# Patient Record
Sex: MALE | Race: WHITE | Employment: OTHER | URBAN - METROPOLITAN AREA
[De-identification: names, ages, dates, MRNs, and addresses within clinical notes are randomized per-mention and may not be internally consistent; named-entity substitution may affect disease eponyms.]

---

## 2017-02-15 ENCOUNTER — HOSPITAL ENCOUNTER (OUTPATIENT)
Dept: PHYSICAL THERAPY | Age: 77
Discharge: HOME | End: 2017-02-28
Admitting: FAMILY MEDICINE

## 2017-02-16 ENCOUNTER — HOSPITAL ENCOUNTER (OUTPATIENT)
Dept: PHYSICAL THERAPY | Age: 77
Discharge: HOME | End: 2017-02-16
Admitting: FAMILY MEDICINE

## 2017-02-21 ENCOUNTER — HOSPITAL ENCOUNTER (OUTPATIENT)
Dept: PHYSICAL THERAPY | Age: 77
Discharge: HOME | End: 2017-02-21
Admitting: FAMILY MEDICINE

## 2017-02-28 ENCOUNTER — HOSPITAL ENCOUNTER (OUTPATIENT)
Dept: PHYSICAL THERAPY | Age: 77
Discharge: HOME | End: 2017-02-28
Admitting: FAMILY MEDICINE

## 2017-03-02 ENCOUNTER — HOSPITAL ENCOUNTER (OUTPATIENT)
Dept: PHYSICAL THERAPY | Age: 77
Discharge: HOME | End: 2017-03-02
Admitting: FAMILY MEDICINE

## 2017-03-07 ENCOUNTER — HOSPITAL ENCOUNTER (OUTPATIENT)
Dept: PHYSICAL THERAPY | Age: 77
Discharge: HOME | End: 2017-03-07
Admitting: FAMILY MEDICINE

## 2017-03-09 ENCOUNTER — HOSPITAL ENCOUNTER (OUTPATIENT)
Dept: PHYSICAL THERAPY | Age: 77
Discharge: HOME | End: 2017-03-09
Admitting: FAMILY MEDICINE

## 2017-03-14 ENCOUNTER — HOSPITAL ENCOUNTER (OUTPATIENT)
Dept: PHYSICAL THERAPY | Age: 77
Discharge: HOME | End: 2017-03-14
Admitting: FAMILY MEDICINE

## 2017-03-16 ENCOUNTER — HOSPITAL ENCOUNTER (OUTPATIENT)
Dept: PHYSICAL THERAPY | Age: 77
Discharge: HOME | End: 2017-03-16
Admitting: FAMILY MEDICINE

## 2017-03-21 ENCOUNTER — HOSPITAL ENCOUNTER (OUTPATIENT)
Dept: PHYSICAL THERAPY | Age: 77
Discharge: HOME | End: 2017-03-21
Admitting: FAMILY MEDICINE

## 2017-03-23 ENCOUNTER — HOSPITAL ENCOUNTER (OUTPATIENT)
Dept: PHYSICAL THERAPY | Age: 77
Discharge: HOME | End: 2017-03-23
Admitting: FAMILY MEDICINE

## 2017-03-28 ENCOUNTER — HOSPITAL ENCOUNTER (OUTPATIENT)
Dept: PHYSICAL THERAPY | Age: 77
Discharge: HOME | End: 2017-03-28
Admitting: FAMILY MEDICINE

## 2017-03-30 ENCOUNTER — HOSPITAL ENCOUNTER (OUTPATIENT)
Dept: PHYSICAL THERAPY | Age: 77
Discharge: HOME | End: 2017-03-30
Admitting: FAMILY MEDICINE

## 2017-04-04 ENCOUNTER — HOSPITAL ENCOUNTER (OUTPATIENT)
Dept: PHYSICAL THERAPY | Age: 77
Discharge: HOME | End: 2017-04-04
Admitting: FAMILY MEDICINE

## 2017-04-11 ENCOUNTER — HOSPITAL ENCOUNTER (OUTPATIENT)
Dept: PHYSICAL THERAPY | Age: 77
Discharge: HOME | End: 2017-04-11
Admitting: FAMILY MEDICINE

## 2017-04-13 ENCOUNTER — HOSPITAL ENCOUNTER (OUTPATIENT)
Dept: PHYSICAL THERAPY | Age: 77
Discharge: HOME | End: 2017-04-13
Admitting: FAMILY MEDICINE

## 2021-08-24 ENCOUNTER — OFFICE VISIT (OUTPATIENT)
Dept: PULMONOLOGY | Age: 81
End: 2021-08-24
Payer: MEDICARE

## 2021-08-24 VITALS
RESPIRATION RATE: 24 BRPM | HEART RATE: 110 BPM | SYSTOLIC BLOOD PRESSURE: 117 MMHG | DIASTOLIC BLOOD PRESSURE: 64 MMHG | OXYGEN SATURATION: 96 % | WEIGHT: 254 LBS | BODY MASS INDEX: 36.36 KG/M2 | TEMPERATURE: 98 F | HEIGHT: 70 IN

## 2021-08-24 DIAGNOSIS — R06.02 SHORTNESS OF BREATH: ICD-10-CM

## 2021-08-24 DIAGNOSIS — G47.33 OBSTRUCTIVE SLEEP APNEA: ICD-10-CM

## 2021-08-24 DIAGNOSIS — J84.112 IPF (IDIOPATHIC PULMONARY FIBROSIS) (HCC): Primary | ICD-10-CM

## 2021-08-24 DIAGNOSIS — J98.6 PARALYSIS OF DIAPHRAGM: ICD-10-CM

## 2021-08-24 DIAGNOSIS — R09.02 HYPOXEMIA: ICD-10-CM

## 2021-08-24 DIAGNOSIS — I26.99 OTHER PULMONARY EMBOLISM WITHOUT ACUTE COR PULMONALE, UNSPECIFIED CHRONICITY (HCC): ICD-10-CM

## 2021-08-24 PROCEDURE — 99214 OFFICE O/P EST MOD 30 MIN: CPT | Performed by: INTERNAL MEDICINE

## 2021-08-24 ASSESSMENT — SLEEP AND FATIGUE QUESTIONNAIRES
HOW LIKELY ARE YOU TO NOD OFF OR FALL ASLEEP WHILE SITTING QUIETLY AFTER LUNCH WITHOUT ALCOHOL: 0
HOW LIKELY ARE YOU TO NOD OFF OR FALL ASLEEP IN A CAR, WHILE STOPPED FOR A FEW MINUTES IN TRAFFIC: 0
ESS TOTAL SCORE: 0
NECK CIRCUMFERENCE (INCHES): 15.75
HOW LIKELY ARE YOU TO NOD OFF OR FALL ASLEEP WHILE LYING DOWN TO REST IN THE AFTERNOON WHEN CIRCUMSTANCES PERMIT: 0
HOW LIKELY ARE YOU TO NOD OFF OR FALL ASLEEP WHILE WATCHING TV: 0
HOW LIKELY ARE YOU TO NOD OFF OR FALL ASLEEP WHILE SITTING AND READING: 0
HOW LIKELY ARE YOU TO NOD OFF OR FALL ASLEEP WHILE SITTING AND TALKING TO SOMEONE: 0
HOW LIKELY ARE YOU TO NOD OFF OR FALL ASLEEP WHEN YOU ARE A PASSENGER IN A CAR FOR AN HOUR WITHOUT A BREAK: 0
HOW LIKELY ARE YOU TO NOD OFF OR FALL ASLEEP WHILE SITTING INACTIVE IN A PUBLIC PLACE: 0

## 2021-08-24 ASSESSMENT — ENCOUNTER SYMPTOMS
ALLERGIC/IMMUNOLOGIC NEGATIVE: 1
GASTROINTESTINAL NEGATIVE: 1
EYES NEGATIVE: 1
RESPIRATORY NEGATIVE: 1

## 2021-08-24 NOTE — PATIENT INSTRUCTIONS
ASSESSMENT/PLAN:  1. IPF (idiopathic pulmonary fibrosis) ([facility]  2. Obstructive sleep apnea  3. Hypoxemia  4. Shortness of breath  5. Paralysis of diaphragm  6. Other pulmonary embolism without acute cor pulmonale, unspecified chronicity (HCC)      Seen by ent but nothing wrong  PFt did not show flow volume loop obstructions  Has had problems with sob for sometime    Has been worked by by ENT, [address] which showed no problems    Has tried inhalers and still not helpiing    Has + sniff test for paralyzed Left diaphragm    Tried pulm rehab and didn't help   Spirometry shows moderate restrictive defect   Stridor is resolved  Leon Kendrick to [facility]- for evaluation of paralyzed left diaphragm and had surgery 8/29/2016  For pulling down the diaphragm    Will get cxr to see about this. Will need to continue and add more exercise          As tried, [facility], advair, symbicort, sprivia, breo, anoro and nothing has helped with the breathing      Pulmonary fibrosis  Continue with:  Esbriet 801mg three times a day (still taking 3 tabs TID)  Will need to check liver function  Last done 9/2020  Oxygen at 2 lpm    GERD  esomeprazole    Will get this checked today, LFT    PE  On eliquis 2.5 mg twice a day    Continue with exercise despite the occasional weakness     Need to do upper body exercise    bipap is 14/10  ti max of 1.6 and min 0.4  Trigger and cycle are med    Full facemask    Humidity at 4  Using 100 % of time  Using 11 h/night    No leak at 1.1 lpm  No sleep apnea, ahi is 0.1   tv is 575  mv is 11.3 lpm    Doing well with bipap  Feeling the benefit of bipap    dme is [employer]  I have access via [email or web address]         Will need walking/rolling walker (rolator), I will order for patient- needs at least 19 inch wide seat  pt's hip to hip measurement is 19\".      Would recommend walking aid , id greg, but would defer to Dr. Fariha Julian     Oxygen to continue with 2 lpm, needed 24 hours a day  [facility]    Weight is coming down , was 299 and then at 282 and then at 277 and then at 290 and then at 287 and now at [phone]    RTC in 4 months    Remember to bring a list of pulmonary medications and any CPAP or BiPAP machines to your next appointment with the office. Please keep all of your future appointments scheduled by [address], [facility] office. Out of respect for other patients and providers, you may be asked to reschedule your appointment if you arrive later than your scheduled appointment time. Appointments cancelled less than 24hrs in advance will be considered a no show. Patients with three missed appointments within 1 year or four missed appointments within 2 years can be dismissed from the practice. Please be aware that our physicians are required to work in the Intensive Care Unit at [facility].  Your appointment may need to be rescheduled if they are designated to work during your appointment time. You may receive a survey regarding the care you received during your visit. Your input is valuable to us. We encourage you to complete and return your survey. We hope you will choose us in the future for your healthcare needs. Pt instructed of all future appointment dates & times, including radiology, labs, procedures & referrals. If procedures were scheduled preparation instructions provided. Instructions on future appointments with [facility] were given.

## 2021-08-24 NOTE — PROGRESS NOTES
MA Communication:   The following orders are received by verbal communication from Bakari Madrigal MD    Orders include:  Orders given to pt for CXR and Labs       4 mo fu scheduled 12/14/21

## 2021-08-24 NOTE — LETTER
8/24/21        Elvin Hayes      I have seen this patient in the office today and wanted to communicate my findings and recommendations. Patient Instructions      ASSESSMENT/PLAN:  1. IPF (idiopathic pulmonary fibrosis) ([address]  2. Obstructive sleep apnea  3. Hypoxemia  4. Shortness of breath  5. Paralysis of diaphragm  6. Other pulmonary embolism without acute cor pulmonale, unspecified chronicity (HCC)      Seen by ent but nothing wrong  PFt did not show flow volume loop obstructions  Has had problems with sob for sometime    Has been worked by by ENT, [address] which showed no problems    Has tried inhalers and still not helpiing    Has + sniff test for paralyzed Left diaphragm    Tried pulm rehab and didn't help   Spirometry shows moderate restrictive defect   Stridor is resolved  Azeem Gamez to [facility]- for evaluation of paralyzed left diaphragm and had surgery 8/29/2016  For pulling down the diaphragm    Will get cxr to see about this.     Will need to continue and add more exercise          As tried, [facility], advair, symbicort, sprivia, breo, anoro and nothing has helped with the breathing      Pulmonary fibrosis  Continue with:  Esbriet 801mg three times a day (still taking 3 tabs TID)  Will need to check liver function  Last done 9/2020  Oxygen at 2 lpm    GERD  esomeprazole    Will get this checked today, LFT    PE  On eliquis 2.5 mg twice a day    Continue with exercise despite the occasional weakness     Need to do upper body exercise    bipap is 14/10  ti max of 1.6 and min 0.4  Trigger and cycle are med    Full facemask    Humidity at 4  Using 100 % of time  Using 11 h/night    No leak at 1.1 lpm  No sleep apnea, ahi is 0.1   tv is 575  mv is 11.3 lpm    Doing well with bipap  Feeling the benefit of bipap    dme is [employer]  I have access via [employer]         Will need walking/rolling walker (rolator), I will order for patient- needs at least 19 inch wide seat  pt's hip to hip measurement is 19\".      Would recommend walking aid , id scooter, but would defer to Dr. Cherylynn Moritz     Oxygen to continue with 2 lpm, needed 24 hours a day  Will [facility] for POC    Weight is coming down , was 299 and then at 282 and then at 277 and then at 290 and then at 287 and now at [phone]    RTC in 4 months                       Thank you for allowing me to assist in the care of the Roxane Schwab MD

## 2021-08-24 NOTE — PROGRESS NOTES
Kang Browne (:  1940) is a [de-identified] y.o. male,Established patient, here for evaluation of the following chief complaint(s):  No chief complaint on file. ASSESSMENT/PLAN:  1. IPF (idiopathic pulmonary fibrosis) ([address]  2. Obstructive sleep apnea  3. Hypoxemia  4. Shortness of breath  5. Paralysis of diaphragm  6. Other pulmonary embolism without acute cor pulmonale, unspecified chronicity (HCC)      Seen by ent but nothing wrong  PFt did not show flow volume loop obstructions  Has had problems with sob for sometime    Has been worked by by ENT, [address] which showed no problems    Has tried inhalers and still not helpiing    Has + sniff test for paralyzed Left diaphragm    Tried pulm rehab and didn't help   Spirometry shows moderate restrictive defect   Stridor is resolved  Leon Marks to [facility]- for evaluation of paralyzed left diaphragm and had surgery 2016  For pulling down the diaphragm    Will get cxr to see about this.     Will need to continue and add more exercise          As tried, [facility], advair, symbicort, sprivia, breo, anoro and nothing has helped with the breathing      Pulmonary fibrosis  Continue with:  Esbriet 801mg three times a day (still taking 3 tabs TID)  Will need to check liver function  Last done 2020  Oxygen at 2 lpm    GERD  esomeprazole    Will get this checked today, LFT    PE  On eliquis 2.5 mg twice a day    Continue with exercise despite the occasional weakness     Need to do upper body exercise    bipap is 14/10  ti max of 1.6 and min 0.4  Trigger and cycle are med    Full facemask    Humidity at 4  Using 100 % of time  Using 11 h/night    No leak at 1.1 lpm  No sleep apnea, ahi is 0.1   tv is 575  mv is 11.3 lpm    Doing well with bipap  Feeling the benefit of bipap    dme is [employer]  I have access via [employer]         Will need walking/rolling walker (rolator), I will order for patient- needs at least 19 inch wide seat  pt's hip to hip measurement is 19\". Would recommend walking aid , id scooter, but would defer to Dr. Zaira Alvares     Oxygen to continue with 2 lpm, needed 24 hours a day  Will [facility] for POC    Weight is coming down , was 299 and then at 282 and then at 277 and then at 290 and then at 287 and now at [address]    RTC in 4 months          No follow-ups on file. [address] [facility]  [address]  [address] [place] [address]  Dept: [phone]  Loc: [phone]     Diagnosis:  [x] VESTA (ICD-10: G47.33)  o CSA (ICD-10: G47.31)  [] Complex Sleep Apnea (ICD-10: G47.37)  []  (ICD-10: G47.37)  [] Hypoxemia (ICD-10: R09.02)  [] COPD (J44.90)  [] Chronic Respiratory Failure with hypoxemia (J96.11)  [] Chronicr Respiratory Failure with hypercapnia (J96.12)  [] Restrictive Lung Disease (J98.4)      [] New Rx (Device Preference: _________________________)     [] Change Order       [] Replace ___________    [] Discontinue Order -  [] CPAP    [] BPAP    [] PAP    [] Oxygen   /   AMA?  [] Yes   [] No              Therapy    AHI: ________ KAYDEN: ________  LOW SpO2: ________%      DME: [facility]    DEVICE / SETTINGS RAMP / COMFORT INTERFACE   []  CPAP () Pressure    Ramp: _________ min's  [] Ramp to patient preference General PAP Supply Kit  Medicaid does not cover heated tubing  ([employer])  PAP Tubing:  [x] Heated ()- 1/3 mos                         [x] Regular ()  1/3 mos  [x] Disposable Water Chamber () - 1/6 mos  [x] Disposable Filter () - 2/mo  [x] Non-Disposable Filter () - 1/6 mos   []  BiLevel PAP ()           IPAP        []  BiLevel PAP with   ()       Backup Rate ()      EPAP Rate  [] Adjust FLEX to patient comfort       SUPPLEMENTAL OXYGEN  [] OXYGEN:      Liter/min: _________  [] Continuous        [] Nocturnal  [] Bleed into PAP Device      []  Xcovery Max Press   [facility]    [] Mask interface () - 1/3 mos  [] Nasal Cushion ()  2/mo  [] Nasal Pillows ()  -2/mo  [] Headgear ()  1/6 mos   []  AutoBiLevel () Pressure  ()      Support           []  ResMed® IVAPS EPAP  [] Overnight Oximetry on Room Air  [] Overnight Oximetry on PAP Therapy    Target Pt Rate  Min PS      Target Va  Patient Ht  Ti Max                Ti Min        Rise time  Max PS  Trigger  Cycle  Epap  Epap max  Epap min  The patient has a history of:  [] Excessive Daytime Sleepiness  [] Insomnia  [] Impaired Cognition  [] Ischemic Heart Disease  [] Hypertension  [] Mood Disorders          [] History of Stroke  Additional Orders:______________________________________________________________________________________________________________________________________________________________________________     Full Face Mask Kit    [x] Full face mask ()  1/3 mos  [x] Full Face Cushion ()  1/mo  [x] Headgear ()  1/6 mos                                           [] Respironics® ASV Advanced ()     EPAP Min  PS Min      EPAP Max  PS Max   Additional Supplies    [] Chin Strap ()  [] Heated Humidifier Kit ()  Mask Specifications:   [] Patient Preference      -or-            Brand:______________ Size:_______________   Type: __________________________________   [] Mask Refit:___________________________                                           Max Press  Ramp Time      Rate  Bi-flex: []1  []2  []3     [] Respironics® AVAPS: ()     IPAP Min  IPAP Max  Pressure Max  Epap max  Epap min  Rise time                      Avaps rate  EPAP   Additional Services    [] Annual PRN service and check of equipment  [] Routine service and check of equipment  [] Download and report compliance data   Tidal volume      Tigger                                     Rate                                         Inspiratory time The following equipment is Medically Necessary for the above stated patient. It is reasonable and necessary in reference to acceptable standards of medical practice for this condition, and is not prescribed as a convenience. Frequency of Use:    Daily                 Length of Need: 13 Months              o The patient requires BiLevel PAP and the following apply: []  The patient requires a Respiratory Assist Device (RAD) and the following apply:   o CPAP was tried and failed to meet therapeutic goals. [] CPAP was tried, but failed to meet therapeutic goals   o The prescribed mask interface has been properly fit, is the most comfortable to the patient and will be used with the BPAP device. [] The prescribed mask interface has been properly fit, is the most comfortable to the patient and will be used with the RAD.   o Current CPAP setting prevents patient from tolerating the therapy and lower CPAP settings fail to adequately control the symptoms of VESTA, improve sleep quality, or reduce AHI to acceptable levels. [] Current CPAP setting prevent patient from tolerating the therapy and lower PAP settings fail to  adequately control VESTA symptoms, improve sleep quality, or reduce AHI to acceptable levels.          [] There is significant improvement of the respiratory events on the RAD                                                                                                                                                                                                                                  Lorenda Olszewski, MD NPI- [id number]     KOTKA- [id number]                    08/24/21       ____________________________                        _______________________           Physician Signature                                                         Date                                                     Subjective   SUBJECTIVE/OBJECTIVE:  Pulmonary Problem, Sleep Problem, and Follow-up    Still with issues of walking and even with urination     No lightheaded     Chest pains, center and left arm pain  Dull pain  Stayed for about 1/2-1 hours  Then went to sleep         The klonopin not working       Seems more stable    Last week was having issues with breathing  Just sob    jsut comes and goes and there is no correlation to any weight  jsut felt weak    Right now taking therapy for the legs    Going rehab and trying to build up the legs  Will consider doing more    No bloating  No bupring  No chest pain  No headache    Pressure feels  Deep enough breath    Some dry mouth    The tank of water dries out too fast and the machine is too hot    Now having issues with walkign and standing    Breathing baseline       More falls and seeing Neurology  Unknown reason  No cva   Having more falls  Unable to find the reason  Will be getting scooter    Having some issues with sob with walking  And           Review of Systems   Constitutional: Negative. HENT: Negative. Eyes: Negative. Respiratory: Negative. Cardiovascular: Negative. Gastrointestinal: Negative. Endocrine: Negative. Genitourinary: Negative. Musculoskeletal: Negative. Skin: Negative. Allergic/Immunologic: Negative. Neurological: Negative. Hematological: Negative. Psychiatric/Behavioral: Negative. Objective   Physical Exam  Vitals and nursing note reviewed. Constitutional:       General: He is not in acute distress. Appearance: Normal appearance. He is not ill-appearing. HENT:      Head: Normocephalic and atraumatic. Right Ear: External ear normal.      Left Ear: External ear normal.      Nose: Nose normal.      Mouth/Throat:      Mouth: Mucous membranes are moist.      Pharynx: Oropharynx is clear. Comments: Mallampati 3  Eyes:      General: No scleral icterus. Extraocular Movements: Extraocular movements intact.       Conjunctiva/sclera: Conjunctivae normal.

## 2021-09-20 LAB
ALBUMIN SERPL-MCNC: 3.7 GM/DL (ref 3.2–4.6)
ALP BLD-CCNC: 130 U/L (ref 40–129)
ALT SERPL-CCNC: 11 U/L
AST SERPL-CCNC: 14 U/L
BILIRUB SERPL-MCNC: 0.3 MG/DL (ref 0.1–1.4)
BILIRUBIN DIRECT: <0.2 MG/DL (ref 0–0.3)
TOTAL PROTEIN: 6.5 GM/DL (ref 6.4–8.3)

## 2021-12-14 ENCOUNTER — OFFICE VISIT (OUTPATIENT)
Dept: PULMONOLOGY | Age: 81
End: 2021-12-14
Payer: MEDICARE

## 2021-12-14 VITALS
BODY MASS INDEX: 36.94 KG/M2 | HEIGHT: 70 IN | TEMPERATURE: 97.5 F | OXYGEN SATURATION: 95 % | HEART RATE: 105 BPM | DIASTOLIC BLOOD PRESSURE: 62 MMHG | WEIGHT: 258 LBS | RESPIRATION RATE: 16 BRPM | SYSTOLIC BLOOD PRESSURE: 145 MMHG

## 2021-12-14 DIAGNOSIS — I26.99 OTHER PULMONARY EMBOLISM WITHOUT ACUTE COR PULMONALE, UNSPECIFIED CHRONICITY (HCC): ICD-10-CM

## 2021-12-14 DIAGNOSIS — J84.112 IPF (IDIOPATHIC PULMONARY FIBROSIS) (HCC): Primary | ICD-10-CM

## 2021-12-14 DIAGNOSIS — R09.02 HYPOXEMIA: ICD-10-CM

## 2021-12-14 DIAGNOSIS — R06.02 SHORTNESS OF BREATH: ICD-10-CM

## 2021-12-14 DIAGNOSIS — J98.6 PARALYSIS OF DIAPHRAGM: ICD-10-CM

## 2021-12-14 DIAGNOSIS — G47.33 OBSTRUCTIVE SLEEP APNEA: ICD-10-CM

## 2021-12-14 PROCEDURE — G8417 CALC BMI ABV UP PARAM F/U: HCPCS | Performed by: INTERNAL MEDICINE

## 2021-12-14 PROCEDURE — G8484 FLU IMMUNIZE NO ADMIN: HCPCS | Performed by: INTERNAL MEDICINE

## 2021-12-14 PROCEDURE — 99214 OFFICE O/P EST MOD 30 MIN: CPT | Performed by: INTERNAL MEDICINE

## 2021-12-14 PROCEDURE — 4040F PNEUMOC VAC/ADMIN/RCVD: CPT | Performed by: INTERNAL MEDICINE

## 2021-12-14 PROCEDURE — G8427 DOCREV CUR MEDS BY ELIG CLIN: HCPCS | Performed by: INTERNAL MEDICINE

## 2021-12-14 PROCEDURE — 1036F TOBACCO NON-USER: CPT | Performed by: INTERNAL MEDICINE

## 2021-12-14 PROCEDURE — 1123F ACP DISCUSS/DSCN MKR DOCD: CPT | Performed by: INTERNAL MEDICINE

## 2021-12-14 ASSESSMENT — ENCOUNTER SYMPTOMS
EYES NEGATIVE: 1
ALLERGIC/IMMUNOLOGIC NEGATIVE: 1
GASTROINTESTINAL NEGATIVE: 1
RESPIRATORY NEGATIVE: 1

## 2021-12-14 NOTE — PROGRESS NOTES
Ranjit Green (:  1940) is a [de-identified] y.o. male,Established patient, here for evaluation of the following chief complaint(s):  Follow-up (4 mon pulm/sleep) and Sleep Apnea         ASSESSMENT/PLAN:  1. IPF (idiopathic pulmonary fibrosis) ([address]  2. Obstructive sleep apnea  3. Hypoxemia  4. Shortness of breath  5. Paralysis of diaphragm  6. Other pulmonary embolism without acute cor pulmonale, unspecified chronicity (HCC)      Seen by ent but nothing wrong  PFt did not show flow volume loop obstructions  Has had problems with sob for sometime    Has been worked by by ENT, [address] which showed no problems    Has tried inhalers and still not helpiing    Has + sniff test for paralyzed Left diaphragm    Tried pulm rehab and didn't help   Spirometry shows moderate restrictive defect   Stridor is resolved  Eliot Castellanos to [facility]- for evaluation of paralyzed left diaphragm and had surgery 2016  For pulling down the diaphragm    cxr done 21    LUNGS/PLEURA:  Chronic diffuse interstitial pulmonary fibrosis.  No acute interval change.            IMPRESSION:   1.  Chronic diffuse interstitial pulmonary fibrosis. 2.  No acute interval change.    SIGNED BY: Edelmira Rojo MD on 2021  4:39 PM         Will need to continue and add more exercise          As tried, [facility], advair, symbicort, sprivia, breo, anoro and nothing has helped with the breathing      Pulmonary fibrosis  Continue with:  Esbriet 801mg three times a day (still taking 3 tabs TID)  Will need to check liver function  Last done 21- normal  Oxygen at 2 lpm    GERD  esomeprazole        PE  On eliquis 2.5 mg twice a day    Continue with exercise despite the occasional weakness     Need to do upper body exercise    bipap is 14/10  ti max of 1.6 and min 0.4  Trigger and cycle are med    Full facemask    Humidity at 4  Using 100 % of time  Using 11 h/night    No leak at 0 lpm  No sleep apnea, ahi is 0.2  Samanta Corbin is 590  mv is 11.5 lpm    Doing well with bipap  Feeling the benefit of bipap    dme is [facility]  I have access via [employer]         Will need walking/rolling walker (rolator), I will order for patient- needs at least 19 inch wide seat  pt's hip to hip measurement is 19\". Oxygen to continue with 2 lpm, needed 24 hours a day  Will ask [facility] for POC    Weight is coming down , was 299 and then at 282 and then at 277 and then at 290 and then at 287 and then at 254 and now 258    COVID vaccine uptodate    RTC in 6 months          No follow-ups on file. Subjective   SUBJECTIVE/OBJECTIVE:  Pulmonary Problem, Sleep Problem, and Follow-up    Still with issues of walking and even with urination     No lightheaded     Chest pains, center and left arm pain  Dull pain  Stayed for about 1/2-1 hours  Then went to sleep         The klonopin not working       Seems more stable    Last week was having issues with breathing  Just sob    jsut comes and goes and there is no correlation to any weight  jsut felt weak    Right now taking therapy for the legs    Going rehab and trying to build up the legs  Will consider doing more    No bloating  No bupring  No chest pain  No headache    Pressure feels  Deep enough breath    Some dry mouth    The tank of water dries out too fast and the machine is too hot    Now having issues with walkign and standing    Breathing baseline       More falls and seeing Neurology  Unknown reason  No cva   Having more falls  Unable to find the reason  Will be getting scooter    haivng issues with neuropathy and     Still falling at times      Having some issues with sob with walking  And           Review of Systems   Constitutional: Negative. HENT: Negative. Eyes: Negative. Respiratory: Negative. Cardiovascular: Negative. Gastrointestinal: Negative. Endocrine: Negative. Genitourinary: Negative. Musculoskeletal: Negative. Skin: Negative. Allergic/Immunologic: Negative. Neurological: Negative. Hematological: Negative. Psychiatric/Behavioral: Negative. Objective   Physical Exam  Vitals and nursing note reviewed. Constitutional:       General: He is not in acute distress. Appearance: Normal appearance. He is not ill-appearing. HENT:      Head: Normocephalic and atraumatic. Right Ear: External ear normal.      Left Ear: External ear normal.      Nose: Nose normal.      Mouth/Throat:      Mouth: Mucous membranes are moist.      Pharynx: Oropharynx is clear. Comments: Mallampati 3  Eyes:      General: No scleral icterus. Extraocular Movements: Extraocular movements intact. Conjunctiva/sclera: Conjunctivae normal.      Pupils: Pupils are equal, round, and reactive to light. Cardiovascular:      Rate and Rhythm: Normal rate and regular rhythm. Pulses: Normal pulses. Heart sounds: Normal heart sounds. No murmur heard. No friction rub. Pulmonary:      Effort: No respiratory distress. Breath sounds: No stridor. No wheezing, rhonchi or rales. Comments: Dec bs at left base  Chest:      Chest wall: No tenderness. Abdominal:      General: Abdomen is flat. Bowel sounds are normal. There is no distension. Tenderness: There is no abdominal tenderness. There is no guarding. Musculoskeletal:         General: No swelling or tenderness. Normal range of motion. Cervical back: Normal range of motion and neck supple. No rigidity. Skin:     General: Skin is warm and dry. Coloration: Skin is not jaundiced. Neurological:      General: No focal deficit present. Mental Status: He is alert and oriented to person, place, and time. Mental status is at baseline. Cranial Nerves: No cranial nerve deficit. Sensory: No sensory deficit. Motor: No weakness. Gait: Gait normal.   Psychiatric:         Mood and Affect: Mood normal.         Thought Content:  Thought content normal.         Judgment: Judgment

## 2021-12-14 NOTE — LETTER
12/14/21        Deena Verdugo      I have seen this patient in the office today and wanted to communicate my findings and recommendations. Patient Instructions        ASSESSMENT/PLAN:  1. IPF (idiopathic pulmonary fibrosis) ([address]  2. Obstructive sleep apnea  3. Hypoxemia  4. Shortness of breath  5. Paralysis of diaphragm  6. Other pulmonary embolism without acute cor pulmonale, unspecified chronicity (HCC)      Seen by ent but nothing wrong  PFt did not show flow volume loop obstructions  Has had problems with sob for sometime    Has been worked by by ENT, [address] which showed no problems    Has tried inhalers and still not helpiing    Has + sniff test for paralyzed Left diaphragm    Tried pulm rehab and didn't help   Spirometry shows moderate restrictive defect   Stridor is resolved  Sania Lemme to [facility]- for evaluation of paralyzed left diaphragm and had surgery 8/29/2016  For pulling down the diaphragm    cxr done 9/20/21    LUNGS/PLEURA:  Chronic diffuse interstitial pulmonary fibrosis.  No acute interval change.            IMPRESSION:   1.  Chronic diffuse interstitial pulmonary fibrosis. 2.  No acute interval change.    SIGNED BY: Deisy Fleming MD on 9/20/2021  4:39 PM         Will need to continue and add more exercise          As tried, Chyrel Fees, advair, symbicort, sprivia, breo, anoro and nothing has helped with the breathing      Pulmonary fibrosis  Continue with:  Esbriet 801mg three times a day (still taking 3 tabs TID)  Will need to check liver function  Last done 9/28/21- normal  Oxygen at 2 lpm    GERD  esomeprazole        PE  On eliquis 2.5 mg twice a day    Continue with exercise despite the occasional weakness     Need to do upper body exercise    bipap is 14/10  ti max of 1.6 and min 0.4  Trigger and cycle are med    Full facemask    Humidity at 4  Using 100 % of time  Using 11 h/night    No leak at 0 lpm  No sleep apnea, ahi is 0.2   tv is 590  mv is 11.5 lpm    Doing well with bipap  Feeling the benefit of bipap    dme is [facility]  I have access via airview         Will need walking/rolling walker (rolator), I will order for patient- needs at least 19 inch wide seat  pt's hip to hip measurement is 19\".      Oxygen to continue with 2 lpm, needed 24 hours a day  Will ask [facility] for POC    Weight is coming down , was 299 and then at 282 and then at 277 and then at 290 and then at 287 and then at 254 and now 258    COVID vaccine uptodate    RTC in 6 months                       Thank you for allowing me to assist in the care of the Og Saravia MD

## 2021-12-14 NOTE — PATIENT INSTRUCTIONS
ASSESSMENT/PLAN:  1. IPF (idiopathic pulmonary fibrosis) ([facility]  2. Obstructive sleep apnea  3. Hypoxemia  4. Shortness of breath  5. Paralysis of diaphragm  6. Other pulmonary embolism without acute cor pulmonale, unspecified chronicity (HCC)      Seen by ent but nothing wrong  PFt did not show flow volume loop obstructions  Has had problems with sob for sometime    Has been worked by by ENT, [address] which showed no problems    Has tried inhalers and still not helpiing    Has + sniff test for paralyzed Left diaphragm    Tried pulm rehab and didn't help   Spirometry shows moderate restrictive defect   Stridor is resolved  Sujey Lori to [facility]- for evaluation of paralyzed left diaphragm and had surgery 8/29/2016  For pulling down the diaphragm    cxr done 9/20/21    LUNGS/PLEURA:  Chronic diffuse interstitial pulmonary fibrosis.  No acute interval change.            IMPRESSION:   1.  Chronic diffuse interstitial pulmonary fibrosis. 2.  No acute interval change.    SIGNED BY: Bharti Romero MD on 9/20/2021  4:39 PM         Will need to continue and add more exercise          As tried, [facility], advair, symbicort, sprivia, breo, anoro and nothing has helped with the breathing      Pulmonary fibrosis  Continue with:  Esbriet 801mg three times a day (still taking 3 tabs TID)  Will need to check liver function  Last done 9/28/21- normal  Oxygen at 2 lpm    GERD  esomeprazole        PE  On eliquis 2.5 mg twice a day    Continue with exercise despite the occasional weakness     Need to do upper body exercise    bipap is 14/10  ti max of 1.6 and min 0.4  Trigger and cycle are med    Full facemask    Humidity at 4  Using 100 % of time  Using 11 h/night    No leak at 0 lpm  No sleep apnea, ahi is 0.2   tv is 590  mv is 11.5 lpm    Doing well with bipap  Feeling the benefit of bipap    dme is [employer]  I have access via airview         Will need walking/rolling walker (rolator), I will order for patient- needs at least 19 inch wide seat  pt's hip to hip measurement is 19\". Oxygen to continue with 2 lpm, needed 24 hours a day  Will ask surinderlucero for POC    Weight is coming down , was 299 and then at 282 and then at 277 and then at 290 and then at 287 and then at 254 and now 258    COVID vaccine uptodate    RTC in 6 months      Remember to bring a list of pulmonary medications and any CPAP or BiPAP machines to your next appointment with the office. Please keep all of your future appointments scheduled by [address] office. Out of respect for other patients and providers, you may be asked to reschedule your appointment if you arrive later than your scheduled appointment time. Appointments cancelled less than 24hrs in advance will be considered a no show. Patients with three missed appointments within 1 year or four missed appointments within 2 years can be dismissed from the practice. Please be aware that our physicians are required to work in the Intensive Care Unit at [facility].  Your appointment may need to be rescheduled if they are designated to work during your appointment time. You may receive a survey regarding the care you received during your visit. Your input is valuable to us. We encourage you to complete and return your survey. We hope you will choose us in the future for your healthcare needs. Pt instructed of all future appointment dates & times, including radiology, labs, procedures & referrals. If procedures were scheduled preparation instructions provided. Instructions on future appointments with [facility] were given.

## 2021-12-14 NOTE — PROGRESS NOTES
MA Communication:   The following orders are received by verbal communication from Barron Pat MD    Orders include:  6 mo fu scheduled 6/14/22

## 2021-12-15 ENCOUNTER — TELEPHONE (OUTPATIENT)
Dept: PULMONOLOGY | Age: 81
End: 2021-12-15

## 2022-05-25 ENCOUNTER — OFFICE VISIT (OUTPATIENT)
Dept: PULMONOLOGY | Age: 82
End: 2022-05-25
Payer: MEDICARE

## 2022-05-25 VITALS
SYSTOLIC BLOOD PRESSURE: 123 MMHG | TEMPERATURE: 96.7 F | DIASTOLIC BLOOD PRESSURE: 77 MMHG | RESPIRATION RATE: 20 BRPM | BODY MASS INDEX: 35.79 KG/M2 | WEIGHT: 250 LBS | OXYGEN SATURATION: 98 % | HEIGHT: 70 IN | HEART RATE: 107 BPM

## 2022-05-25 DIAGNOSIS — J84.112 IPF (IDIOPATHIC PULMONARY FIBROSIS) (HCC): Primary | ICD-10-CM

## 2022-05-25 DIAGNOSIS — I26.99 OTHER PULMONARY EMBOLISM WITHOUT ACUTE COR PULMONALE, UNSPECIFIED CHRONICITY (HCC): ICD-10-CM

## 2022-05-25 DIAGNOSIS — J98.6 PARALYSIS OF DIAPHRAGM: ICD-10-CM

## 2022-05-25 DIAGNOSIS — R09.02 HYPOXEMIA: ICD-10-CM

## 2022-05-25 DIAGNOSIS — G47.33 OBSTRUCTIVE SLEEP APNEA: ICD-10-CM

## 2022-05-25 DIAGNOSIS — R06.02 SHORTNESS OF BREATH: ICD-10-CM

## 2022-05-25 PROCEDURE — 1036F TOBACCO NON-USER: CPT | Performed by: INTERNAL MEDICINE

## 2022-05-25 PROCEDURE — G8417 CALC BMI ABV UP PARAM F/U: HCPCS | Performed by: INTERNAL MEDICINE

## 2022-05-25 PROCEDURE — 1123F ACP DISCUSS/DSCN MKR DOCD: CPT | Performed by: INTERNAL MEDICINE

## 2022-05-25 PROCEDURE — 99214 OFFICE O/P EST MOD 30 MIN: CPT | Performed by: INTERNAL MEDICINE

## 2022-05-25 PROCEDURE — G8427 DOCREV CUR MEDS BY ELIG CLIN: HCPCS | Performed by: INTERNAL MEDICINE

## 2022-05-25 RX ORDER — PIRFENIDONE 801 MG/1
TABLET, COATED ORAL 3 TIMES DAILY
COMMUNITY
End: 2022-07-18

## 2022-05-25 ASSESSMENT — ENCOUNTER SYMPTOMS
EYES NEGATIVE: 1
ALLERGIC/IMMUNOLOGIC NEGATIVE: 1
RESPIRATORY NEGATIVE: 1
GASTROINTESTINAL NEGATIVE: 1

## 2022-05-25 NOTE — PROGRESS NOTES
MA Communication:   The following orders are received by verbal communication from Katelin Epstein MD    Orders include:  Order sent to [address]       6 mo fu scheduled 11/30/22

## 2022-05-25 NOTE — LETTER
[facility] Critical [facility]  [address] [address] [address]  Phone: [phone]  Fax: [phone]    Ana Elias MD        May 25, 2022     Patient: Sade Eng   YOB: 1940   Date of Visit: 5/25/2022 5/25/22        Sade Eng      I have seen this patient in the office today and wanted to communicate my findings and recommendations. Patient Instructions        ASSESSMENT/PLAN:  1. IPF (idiopathic pulmonary fibrosis) (HCC)  2. Paralysis of diaphragm  3. Obstructive sleep apnea  4. Hypoxemia  5. Shortness of breath  6. Other pulmonary embolism without acute cor pulmonale, unspecified chronicity (HCC)      Seen by ent but nothing wrong  PFt did not show flow volume loop obstructions  Has had problems with sob for sometime    Has been worked by by ENT, [address] which showed no problems    Has tried inhalers and still not helpiing    Has + sniff test for paralyzed Left diaphragm    Tried pulm rehab and didn't help   Spirometry shows moderate restrictive defect   Stridor is resolved  Sari Pollard to [facility]- for evaluation of paralyzed left diaphragm and had surgery 8/29/2016  For pulling down the diaphragm    cxr done 9/20/21    LUNGS/PLEURA:  Chronic diffuse interstitial pulmonary fibrosis.  No acute interval change.            IMPRESSION:   1.  Chronic diffuse interstitial pulmonary fibrosis. 2.  No acute interval change.    SIGNED BY: Erika Leger MD on 9/20/2021  4:39 PM         Will need to continue and add more exercise          As tried, Jigar Gin, advair, symbicort, sprivia, breo, anoro and nothing has helped with the breathing      Pulmonary fibrosis  Continue with:  Esbriet 801mg three times a day (still taking 3 tabs TID)  Will need to check liver function  Last done 9/28/21- normal  Oxygen at 2 lpm    GERD  esomeprazole        PE  On eliquis 2.5 mg twice a day    Continue with exercise despite the occasional weakness     Need to do upper body exercise      VESTA  bipap is 14/10  Set up date was 3/22/2018  ti max of 1.6 and min 0.4  Trigger and cycle are med    Full facemask    Humidity at 4  Using 100 % of time  Using 11 h/night    No leak at 0 lpm  No sleep apnea, ahi is 0.2   tv is 606  mv is 12.5 lpm    Doing well with bipap  Feeling the benefit of bipap    dme is [facility]  I have access via airview        Oxygen to continue with 2 lpm, needed 24 hours a day  Will ask [facility] for POC    Weight is coming down , was 299 and then at 282 and then at 277 and then at 290 and then at 287 and then at 254 and then 258 now at [address]    RTC in 6 months                       Thank you for allowing me to assist in the care of the MD Zechariah Ramos MD

## 2022-05-25 NOTE — PROGRESS NOTES
Ryan Akins (:  1940) is a [de-identified] y.o. male,Established patient, here for evaluation of the following chief complaint(s): Other (IPF) and Sleep Apnea         ASSESSMENT/PLAN:  1. IPF (idiopathic pulmonary fibrosis) (HCC)  2. Paralysis of diaphragm  3. Obstructive sleep apnea  4. Hypoxemia  5. Shortness of breath  6. Other pulmonary embolism without acute cor pulmonale, unspecified chronicity (HCC)      Seen by ent but nothing wrong  PFt did not show flow volume loop obstructions  Has had problems with sob for sometime    Has been worked by by ENT, [address] which showed no problems    Has tried inhalers and still not helpiing    Has + sniff test for paralyzed Left diaphragm    Tried pulm rehab and didn't help   Spirometry shows moderate restrictive defect   Stridor is resolved  Rivas Morales to [facility]- for evaluation of paralyzed left diaphragm and had surgery 2016  For pulling down the diaphragm    cxr done 21    LUNGS/PLEURA:  Chronic diffuse interstitial pulmonary fibrosis.  No acute interval change.            IMPRESSION:   1.  Chronic diffuse interstitial pulmonary fibrosis. 2.  No acute interval change.    SIGNED BY: Andrade Templeton MD on 2021  4:39 PM         Will need to continue and add more exercise          As tried, Carmela Qi, adv, symbicort, sprivia, breo, anoro and nothing has helped with the breathing      Pulmonary fibrosis  Continue with:  Esbriet 801mg three times a day (still taking 3 tabs TID)  Will need to check liver function  Last done 21- normal  Oxygen at 2 lpm    GERD  esomeprazole        PE  On eliquis 2.5 mg twice a day    Continue with exercise despite the occasional weakness     Need to do upper body exercise      VESTA  bipap is 14/10  Set up date was 3/22/2018  ti max of 1.6 and min 0.4  Trigger and cycle are med    Full facemask    Humidity at 4  Using 100 % of time  Using 11 h/night    No leak at 0 lpm  No sleep apnea, ahi is 0.2   tv is 606  mv is 12.5 lpm    Doing well with bipap  Feeling the benefit of bipap    dme is sru[facility]lucero  I have access via airview        Oxygen to continue with 2 lpm, needed 24 hours a day  Will ask [facility] for POC    Weight is coming down , was 299 and then at 282 and then at 277 and then at 290 and then at 287 and then at 254 and then 258 now at [address] [facility] in 6 months          No follow-ups on file. I have personally reviewed and summarized the old records and/or obtained further history from someone other than the patient. I have had a discussion with another health care provider    I have independently reviewed the images and reviewed with patient    I have reviewed the lab tests, radiology reports and medications    I have downloaded and interpreted the cpap/bipap/pap data. I have made adjustments as described    Reviewed present meds and side effects. Continue present meds. Stay compliant. Call if worsens. Reviewed proper inhaler usage             [address] [facility]  [address]  [address] [place] [address]  Dept: [phone]  Loc: [phone]     Diagnosis:  [x] VESTA (ICD-10: G47.33)  o CSA (ICD-10: G47.31)  [] Complex Sleep Apnea (ICD-10: G47.37)  []  (ICD-10: G47.37)  [] Hypoxemia (ICD-10: R09.02)  [x] COPD (J44.90)  [] Chronic Respiratory Failure with hypoxemia (J96.11)  [] Chronicr Respiratory Failure with hypercapnia (J96.12)  [] Restrictive Lung Disease (J98.4)      [] New Rx (Device Preference: _________________________)     [] Change Order       [] Replace ___________  [] Clinical assessments and may include IN-Check device, spirometry and ETCO2 PRN    [] Discontinue Order -  [] CPAP    [] BPAP    [] PAP    [] Oxygen   /   AMA?  [] Yes   [] No              Therapy    AHI: ________ KAYDEN: ________  LOW SpO2: ________%      DME:    DEVICE / SETTINGS RAMP / COMFORT INTERFACE   []  CPAP () Pressure    Ramp: _________ min's  [] Ramp to patient preference General PAP Supply Kit  Medicaid does not cover heated tubing  ([employer])  PAP Tubing:  [x] Heated ()- 1/3 mos                         [x] Regular () - 1/3 mos  [x] Disposable Water Chamber () - 1/6 mos  [x] Disposable Filter () - 2/mo  [x] Non-Disposable Filter () - 1/6 mos   []  BiLevel PAP ()           IPAP        []  BiLevel PAP with   ()       Backup Rate ()      EPAP Rate  [] Adjust FLEX to patient comfort       SUPPLEMENTAL OXYGEN  [] OXYGEN:      Liter/min: _________  [] Continuous        [] Nocturnal  [] Bleed into PAP Device      []  [address] Kit    [] Mask interface () - 1/3 mos  [] Nasal Cushion () - 2/mo  [] Nasal Pillows ()  -2/mo  [] Headgear () - 1/6 mos   []  AutoBiLevel () Pressure  ()      Support           []  ResMed® IVAPS EPAP  [] Overnight Oximetry on Room Air  [] Overnight Oximetry on PAP Therapy    Target Pt Rate  Min PS      Target Va  Patient Ht  Ti Max                Ti Min        Rise time  Max PS  Trigger  Cycle  Epap  Epap max  Epap min  The patient has a history of:  [] Excessive Daytime Sleepiness  [] Insomnia  [] Impaired Cognition  [] Ischemic Heart Disease  [] Hypertension  [] Mood Disorders          [] History of Stroke  Additional Orders:______________________________________________________________________________________________________________________________________________________________________________    [] Titrate to comfort Full Face Mask Kit    [x] Full face mask () - 1/3 mos  [x] Full Face Cushion () - 1/mo  [x] Headgear () - 1/6 mos                                           [] Respironics® ASV Advanced ()     EPAP Min  PS Min      EPAP Max  PS Max   Additional Supplies    [] Chin Strap ()  [] Heated Humidifier Kit ()  Mask Specifications:   [] Patient Preference      -or-            Brand:______________ Size:_______________   Type: __________________________________   [x] Mask Refit:_____pt losing weight and will need new full fac emask______________________                                           Max Press  Ramp Time      Rate  Bi-flex: []1  []2  []3     [] Respironics® AVAPS: ()     IPAP Min  IPAP Max  Pressure Max  Epap max  Epap min  Rise time                      Avaps rate  EPAP   Additional Services    [] Annual PRN service and check of equipment  [] Routine service and check of equipment  [] Download and report compliance data   Tidal volume      Tigger                                     Rate                                         Inspiratory time                                                         The following equipment is Medically Necessary for the above stated patient. It is reasonable and necessary in reference to acceptable standards of medical practice for this condition, and is not prescribed as a convenience. Frequency of Use:    Daily                 Length of Need: 13 Months              o The patient requires BiLevel PAP and the following apply: []  The patient requires a Respiratory Assist Device (RAD) and the following apply:   o CPAP was tried and failed to meet therapeutic goals. [] CPAP was tried, but failed to meet therapeutic goals   o The prescribed mask interface has been properly fit, is the most comfortable to the patient and will be used with the BPAP device. [] The prescribed mask interface has been properly fit, is the most comfortable to the patient and will be used with the RAD.   o Current CPAP setting prevents patient from tolerating the therapy and lower CPAP settings fail to adequately control the symptoms of VESTA, improve sleep quality, or reduce AHI to acceptable levels.  [] Current CPAP setting prevent patient from tolerating the therapy and lower PAP settings fail to  adequately control VESTA symptoms, improve sleep quality, or reduce AHI to acceptable levels. [] There is significant improvement of the respiratory events on the RAD                                                                                                                                                                                                                                  Trinh Jarrett MD               NPI- [id number]     [facility]- [phone]                    05/25/22       ____________________________                        _______________________           Physician Signature                                                         Date                                                     Subjective   SUBJECTIVE/OBJECTIVE:  Pulmonary Problem, Sleep Problem, and Follow-up    Still with issues of walking and even with urination     No lightheaded     Chest pains, center and left arm pain  Dull pain  Stayed for about 1/2-1 hours  Then went to sleep         The klonopin not working       Seems more stable    Last week was having issues with breathing  Just sob    jsut comes and goes and there is no correlation to any weight  jsut felt weak    Right now taking therapy for the legs    Going rehab and trying to build up the legs  Will consider doing more    No bloating  No bupring  No chest pain  No headache    Pressure feels  Deep enough breath    Some dry mouth  \  Now having issues with walkign and standing    Breathing baseline       More falls and seeing Neurology  Unknown reason  No cva   Having more falls  Unable to find the reason  Will be getting scooter    haivng issues with neuropathy and     Still falling at times      Now using rolator  Still sob  No wheezing   No hemoptyiss  No chest pain    Other        Review of Systems   Constitutional: Negative. HENT: Negative. Eyes: Negative. Respiratory: Negative. Cardiovascular: Negative.     Gastrointestinal: Negative. Endocrine: Negative. Genitourinary: Negative. Musculoskeletal: Negative. Skin: Negative. Allergic/Immunologic: Negative. Neurological: Negative. Hematological: Negative. Psychiatric/Behavioral: Negative. Vitals:    05/25/22 1301   BP: 123/77   Site: Left Lower Arm   Position: Sitting   Cuff Size: Medium Adult   Pulse: (!) 107   Resp: 20   Temp: (!) 96.7 °F (35.9 °C)   TempSrc: Temporal   SpO2: 98%   Weight: 250 lb (113.4 kg)   Height: 5' 10\" (1.778 m)       Objective   Physical Exam  Vitals and nursing note reviewed. Constitutional:       General: He is not in acute distress. Appearance: Normal appearance. He is not ill-appearing. HENT:      Head: Normocephalic and atraumatic. Right Ear: External ear normal.      Left Ear: External ear normal.      Nose: Nose normal.      Mouth/Throat:      Mouth: Mucous membranes are moist.      Pharynx: Oropharynx is clear. Comments: Mallampati 3  Eyes:      General: No scleral icterus. Extraocular Movements: Extraocular movements intact. Conjunctiva/sclera: Conjunctivae normal.      Pupils: Pupils are equal, round, and reactive to light. Cardiovascular:      Rate and Rhythm: Normal rate and regular rhythm. Pulses: Normal pulses. Heart sounds: Normal heart sounds. No murmur heard. No friction rub. Pulmonary:      Effort: No respiratory distress. Breath sounds: No stridor. No wheezing, rhonchi or rales. Comments: Dec bs at left base  Chest:      Chest wall: No tenderness. Abdominal:      General: Abdomen is flat. Bowel sounds are normal. There is no distension. Tenderness: There is no abdominal tenderness. There is no guarding. Musculoskeletal:         General: No swelling or tenderness. Normal range of motion. Cervical back: Normal range of motion and neck supple. No rigidity. Skin:     General: Skin is warm and dry. Coloration: Skin is not jaundiced.    Neurological:

## 2022-05-25 NOTE — PATIENT INSTRUCTIONS
ASSESSMENT/PLAN:  1. IPF (idiopathic pulmonary fibrosis) (HCC)  2. Paralysis of diaphragm  3. Obstructive sleep apnea  4. Hypoxemia  5. Shortness of breath  6. Other pulmonary embolism without acute cor pulmonale, unspecified chronicity (HCC)      Seen by ent but nothing wrong  PFt did not show flow volume loop obstructions  Has had problems with sob for sometime    Has been worked by by ENT, [address] which showed no problems    Has tried inhalers and still not helpiing    Has + sniff test for paralyzed Left diaphragm    Tried pulm rehab and didn't help   Spirometry shows moderate restrictive defect   Stridor is resolved  Sari Pollard to [facility] of paralyzed left diaphragm and had surgery 8/29/2016  For pulling down the diaphragm    cxr done 9/20/21    LUNGS/PLEURA:  Chronic diffuse interstitial pulmonary fibrosis.  No acute interval change.            IMPRESSION:   1.  Chronic diffuse interstitial pulmonary fibrosis. 2.  No acute interval change.    SIGNED BY: Erika Leger MD on 9/20/2021  4:39 PM         Will need to continue and add more exercise          As tried, [place], advair, symbicort, sprivia, breo, anoro and nothing has helped with the breathing      Pulmonary fibrosis  Continue with:  Esbriet 801mg three times a day (still taking 3 tabs TID)  Will need to check liver function  Last done 9/28/21- normal  Oxygen at 2 lpm    GERD  esomeprazole        PE  On eliquis 2.5 mg twice a day    Continue with exercise despite the occasional weakness     Need to do upper body exercise      VESTA  bipap is 14/10  Set up date was 3/22/2018  ti max of 1.6 and min 0.4  Trigger and cycle are med    Full facemask    Humidity at 4  Using 100 % of time  Using 11 h/night    No leak at 0 lpm  No sleep apnea, ahi is 0.2   tv is 606  mv is 12.5 lpm    Doing well with bipap  Feeling the benefit of bipap    dme is [employer]  I have access via airview        Oxygen to continue with 2 lpm, needed 24 hours a day  Will ask tierney for POC    Weight is coming down , was 299 and then at 282 and then at 277 and then at 290 and then at 287 and then at 254 and then 258 now at [address] [facility]    RTC in 6 months    Remember to bring a list of pulmonary medications and any CPAP or BiPAP machines to your next appointment with the office. Please keep all of your future appointments scheduled by [facility] office. Out of respect for other patients and providers, you may be asked to reschedule your appointment if you arrive later than your scheduled appointment time. Appointments cancelled less than 24hrs in advance will be considered a no show. Patients with three missed appointments within 1 year or four missed appointments within 2 years can be dismissed from the practice. Please be aware that our physicians are required to work in the Intensive Care Unit at [facility].  Your appointment may need to be rescheduled if they are designated to work during your appointment time. You may receive a survey regarding the care you received during your visit. Your input is valuable to us. We encourage you to complete and return your survey. We hope you will choose us in the future for your healthcare needs. Pt instructed of all future appointment dates & times, including radiology, labs, procedures & referrals. If procedures were scheduled preparation instructions provided. Instructions on future appointments with [facility] were given.

## 2022-07-18 RX ORDER — PIRFENIDONE 801 MG/1
TABLET, COATED ORAL
Qty: 90 TABLET | Refills: 11 | Status: SHIPPED | OUTPATIENT
Start: 2022-07-18

## 2022-11-30 ENCOUNTER — TELEPHONE (OUTPATIENT)
Dept: PULMONOLOGY | Age: 82
End: 2022-11-30

## 2022-11-30 ENCOUNTER — OFFICE VISIT (OUTPATIENT)
Dept: PULMONOLOGY | Age: 82
End: 2022-11-30
Payer: MEDICARE

## 2022-11-30 VITALS
HEART RATE: 107 BPM | WEIGHT: 252.2 LBS | DIASTOLIC BLOOD PRESSURE: 67 MMHG | SYSTOLIC BLOOD PRESSURE: 137 MMHG | OXYGEN SATURATION: 95 % | BODY MASS INDEX: 36.11 KG/M2 | HEIGHT: 70 IN | RESPIRATION RATE: 20 BRPM | TEMPERATURE: 97.8 F

## 2022-11-30 DIAGNOSIS — G47.33 OSA ON CPAP: ICD-10-CM

## 2022-11-30 DIAGNOSIS — J98.6 PARALYZED HEMIDIAPHRAGM: ICD-10-CM

## 2022-11-30 DIAGNOSIS — J84.10 PULMONARY FIBROSIS (HCC): Primary | Chronic | ICD-10-CM

## 2022-11-30 DIAGNOSIS — Z99.89 OSA ON CPAP: ICD-10-CM

## 2022-11-30 PROBLEM — I82.4Z2 DEEP VEIN THROMBOSIS (DVT) OF DISTAL VEIN OF LEFT LOWER EXTREMITY (HCC): Status: ACTIVE | Noted: 2017-09-26

## 2022-11-30 PROBLEM — I26.99 PULMONARY EMBOLISM AND INFARCTION (HCC): Status: ACTIVE | Noted: 2017-09-26

## 2022-11-30 PROCEDURE — 3074F SYST BP LT 130 MM HG: CPT | Performed by: NURSE PRACTITIONER

## 2022-11-30 PROCEDURE — G8427 DOCREV CUR MEDS BY ELIG CLIN: HCPCS | Performed by: NURSE PRACTITIONER

## 2022-11-30 PROCEDURE — 1123F ACP DISCUSS/DSCN MKR DOCD: CPT | Performed by: NURSE PRACTITIONER

## 2022-11-30 PROCEDURE — 3078F DIAST BP <80 MM HG: CPT | Performed by: NURSE PRACTITIONER

## 2022-11-30 PROCEDURE — G8417 CALC BMI ABV UP PARAM F/U: HCPCS | Performed by: NURSE PRACTITIONER

## 2022-11-30 PROCEDURE — 99214 OFFICE O/P EST MOD 30 MIN: CPT | Performed by: NURSE PRACTITIONER

## 2022-11-30 PROCEDURE — G8484 FLU IMMUNIZE NO ADMIN: HCPCS | Performed by: NURSE PRACTITIONER

## 2022-11-30 PROCEDURE — 1036F TOBACCO NON-USER: CPT | Performed by: NURSE PRACTITIONER

## 2022-11-30 RX ORDER — PIOGLITAZONEHYDROCHLORIDE 45 MG/1
TABLET ORAL
COMMUNITY
Start: 2022-09-30

## 2022-11-30 RX ORDER — APIXABAN 2.5 MG/1
TABLET, FILM COATED ORAL
COMMUNITY
Start: 2022-11-27

## 2022-11-30 RX ORDER — ATORVASTATIN CALCIUM 80 MG/1
TABLET, FILM COATED ORAL
COMMUNITY
Start: 2022-11-25

## 2022-11-30 RX ORDER — ZOLPIDEM TARTRATE 10 MG/1
TABLET ORAL
COMMUNITY
Start: 2022-09-17

## 2022-11-30 RX ORDER — OMEPRAZOLE 40 MG/1
CAPSULE, DELAYED RELEASE ORAL
COMMUNITY
Start: 2022-09-30

## 2022-11-30 RX ORDER — QUETIAPINE FUMARATE 25 MG/1
TABLET, FILM COATED ORAL
COMMUNITY
Start: 2022-09-28

## 2022-11-30 RX ORDER — NORTRIPTYLINE HYDROCHLORIDE 50 MG/1
CAPSULE ORAL
COMMUNITY
Start: 2022-10-26

## 2022-11-30 ASSESSMENT — SLEEP AND FATIGUE QUESTIONNAIRES
ESS TOTAL SCORE: 0
HOW LIKELY ARE YOU TO NOD OFF OR FALL ASLEEP IN A CAR, WHILE STOPPED FOR A FEW MINUTES IN TRAFFIC: 0
HOW LIKELY ARE YOU TO NOD OFF OR FALL ASLEEP WHILE LYING DOWN TO REST IN THE AFTERNOON WHEN CIRCUMSTANCES PERMIT: 0
HOW LIKELY ARE YOU TO NOD OFF OR FALL ASLEEP WHILE SITTING INACTIVE IN A PUBLIC PLACE: 0
HOW LIKELY ARE YOU TO NOD OFF OR FALL ASLEEP WHILE SITTING AND TALKING TO SOMEONE: 0
NECK CIRCUMFERENCE (INCHES): 19
HOW LIKELY ARE YOU TO NOD OFF OR FALL ASLEEP WHEN YOU ARE A PASSENGER IN A CAR FOR AN HOUR WITHOUT A BREAK: 0
HOW LIKELY ARE YOU TO NOD OFF OR FALL ASLEEP WHILE SITTING QUIETLY AFTER LUNCH WITHOUT ALCOHOL: 0
HOW LIKELY ARE YOU TO NOD OFF OR FALL ASLEEP WHILE SITTING AND READING: 0
HOW LIKELY ARE YOU TO NOD OFF OR FALL ASLEEP WHILE WATCHING TV: 0

## 2022-11-30 ASSESSMENT — ENCOUNTER SYMPTOMS
SORE THROAT: 0
ABDOMINAL PAIN: 0
CHEST TIGHTNESS: 0
RHINORRHEA: 0
COUGH: 0
SHORTNESS OF BREATH: 0
EYE PAIN: 0
WHEEZING: 0

## 2022-11-30 NOTE — TELEPHONE ENCOUNTER
[facility] called stating Wm. Was their to get his blood work done.   I faxed over a copy of the order to them @ [phone] clear

## 2022-11-30 NOTE — PROGRESS NOTES
MA Communication:   The following orders are received by verbal communication from Zachary Noe, [address]    Orders include:  6 month f/u Dr. Boris Lin  Blood work to be completed

## 2022-11-30 NOTE — PROGRESS NOTES
Chief Complaint   Patient presents with    Follow-up     6 month Pulm / Sleep     Juan Pablo Denny comes in today for follow-up of PAP therapy and his Pulmonary fibrosis, paralyzed diaphragm. He continues Esbriet and oxygen therapy. He has tried inhalers in the past however did not help. He had surgery on his diaphragm in 2016 at the [facility]. Today, states his breathing is at his baseline. He continues oxygen therapy along with Bipap at night. Patient had symptoms of EDS, snoring and poor sleep quality at time of diagnosis, resolved with pap therapy. Denies HA, ear popping or belching. No recent changes in health history. Reports sleepiness is better. Is not having extended sleeping. Is using equipment for 10 hours a night. Up at night to use the bathroom about: 1-2  Is not snoring with machine. Does not have dry mouth   Is not complaining of mask issues  Is tolerating the pressure. Sleep Medicine 11/30/2022 8/24/2021   Sitting and reading 0 0   Watching TV 0 0   Sitting, inactive in a public place (e.g. a theatre or a meeting) 0 0   As a passenger in a car for an hour without a break 0 0   Lying down to rest in the afternoon when circumstances permit 0 0   Sitting and talking to someone 0 0   Sitting quietly after a lunch without alcohol 0 0   In a car, while stopped for a few minutes in traffic 0 0   [place] Sleepiness Score 0 0   Neck circumference (Inches) 19 15.75     0 = no chance of dozing  1 = slight chance of dozing  2 = moderate chance of dozing  3 = high chance of dozing    Interpretation:   0-7: It is unlikely that you are abnormally sleepy. 8-9:     You have an average amount of daytime sleepiness. 10-15: You may be excessively sleepy depending on the situation. You may want to consider seeking medical attention. 16-24:   You are excessively sleepy and should consider seeking medical attention      PAP Compliance report reviewed dates: 10/28/22- 11/26/22:    PAP data -set on IPAP 14, EPAP 10  Days with usage 29/30 days   Days without device usage 1  Percent days with Device Usage 97 %   Percent of days with Usage greater than  4 hours 97 %   Average usage days used 10 hours 33 minutes     AHI 0.2      Exercise/diet: Weight today, 252 lbs, was 258 last December 2021. Not doing regular activity. Current Outpatient Medications   Medication Sig Dispense Refill    pioglitazone (ACTOS) 45 MG tablet       nortriptyline (PAMELOR) 50 MG capsule       zolpidem (AMBIEN) 10 MG tablet       QUEtiapine (SEROQUEL) 25 MG tablet       omeprazole (PRILOSEC) 40 MG delayed release capsule       atorvastatin (LIPITOR) 80 MG tablet TAKE 1 TABLET BY MOUTH EVERY DAY      ELIQUIS 2.5 MG TABS tablet       ESBRIET 801 MG TABS TAKE 1 TABLET THREE TIMES A DAY WITH FOOD 90 tablet 11    amLODIPine (NORVASC) 5 MG tablet TAKE 1 TABLET BY MOUTH EVERY DAY 90 tablet 0    isosorbide mononitrate (IMDUR) 30 MG CR tablet TAKE 1 TABLET BY MOUTH EVERY DAY 30 tablet 5    Multiple Vitamins-Minerals (THERAPEUTIC MULTIVITAMIN-MINERALS) tablet Take 1 tablet by mouth daily. furosemide (LASIX) 40 MG tablet Take 40 mg by mouth every morning. rosuvastatin (CRESTOR) 20 MG tablet Take 20 mg by mouth every evening. diclofenac (VOLTAREN) 75 MG EC tablet Take 75 mg by mouth 2 times daily. clonazepam (KLONOPIN) 1 MG disintegrating tablet Take 1 mg by mouth nightly as needed. SITagliptin (JANUVIA) 100 MG tablet Take 100 mg by mouth daily. ezetimibe (ZETIA) 10 MG tablet Take 10 mg by mouth every evening. aspirin 81 MG tablet Take 81 mg by mouth daily. No current facility-administered medications for this visit.         Allergies   Allergen Reactions    Azithromycin Itching        Past Medical History:   Diagnosis Date    AAA (abdominal aortic aneurysm)     Diabetes mellitus ([address]     Hyperlipidemia     Hypertension     Pulmonary embolus (HCC)     Pulmonary fibrosis [facility])         Past Surgical History:   Procedure Laterality Date    CATARACT REMOVAL WITH IMPLANT      bilateral eyes    CERVICAL DISCECTOMY      and fusion (anterior)    CYST REMOVAL      from the buttocks    FEMORAL BYPASS      bi-femoral bypass    HERNIA REPAIR      x 2    JOINT REPLACEMENT      bilateral knee replacement    NASAL SEPTUM SURGERY      SHOULDER SURGERY      left    TESTICLE REMOVAL      left    TOE SURGERY          Review of Systems   Constitutional:  Negative for chills, fatigue and fever. HENT:  Negative for congestion, postnasal drip, rhinorrhea and sore throat. Eyes:  Negative for pain and visual disturbance. Blurring in r eye that varies. Seeing eye doc. Respiratory:  Negative for cough, chest tightness, shortness of breath and wheezing. Cardiovascular:  Negative for chest pain, palpitations and leg swelling. Gastrointestinal:  Negative for abdominal pain. Genitourinary:  Negative for difficulty urinating. Musculoskeletal: Negative. Skin: Negative. Neurological:  Negative for dizziness, numbness and headaches. Psychiatric/Behavioral:  The patient is not nervous/anxious. /67 (Site: Left Lower Arm, Position: Sitting, Cuff Size: Medium Adult)   Pulse (!) 107   Temp 97.8 °F (36.6 °C) (Temporal)   Resp 20   Ht 5' 10\" (1.778 m)   Wt 252 lb 3.2 oz (114.4 kg)   SpO2 95% Comment: 2 ltrs  BMI 36.19 kg/m²     Additional Measurements    11/30/22 1310   Neck circumference (Inches): 19       Imaging/test:  CXR 9/2021 [facility]:      IMPRESSION:   1. Chronic diffuse interstitial pulmonary fibrosis. 2.  No acute interval change. PFT: 7/12/13 at [facility]:   Spirometry shows a moderate reduction in FVC with a mild reduction in FEV1. The FEV1:FVC ratio is within normal limits. There is no improvement after  bronchodilators. Lung volumes suggest associated restrictive defect and  diffusion capacity is within normal limits.       IMPRESSION:  Pulmonary function test without significant obstructive defect. There is an element of restriction suggested. In comparison with previous testing May 2009, the diffusion capacity is  improved. Total lung capacity has improved and spirometry is approximately  the same. Physical Exam  Vitals reviewed. Constitutional:       General: He is not in acute distress. Appearance: Normal appearance. He is obese. He is not ill-appearing, toxic-appearing or diaphoretic. HENT:      Head: Normocephalic and atraumatic. Nose: Nose normal. No congestion or rhinorrhea. Mouth/Throat:      Mouth: Mucous membranes are moist.      Pharynx: Oropharynx is clear. No oropharyngeal exudate or posterior oropharyngeal erythema. Eyes:      Pupils: Pupils are equal, round, and reactive to light. Cardiovascular:      Rate and Rhythm: Normal rate. Pulmonary:      Effort: Pulmonary effort is normal. No respiratory distress. Breath sounds: No stridor. Rales (faint, scattered) present. No wheezing or rhonchi. Chest:      Chest wall: No tenderness. Abdominal:      Palpations: Abdomen is soft. Tenderness: There is no abdominal tenderness. There is no guarding or rebound. Musculoskeletal:         General: Normal range of motion. Cervical back: Neck supple. Right lower leg: No edema. Left lower leg: No edema. Lymphadenopathy:      Cervical: No cervical adenopathy. Skin:     General: Skin is warm and dry. Capillary Refill: Capillary refill takes less than 2 seconds. Neurological:      Mental Status: He is alert and oriented to person, place, and time. Psychiatric:         Mood and Affect: Mood normal.         Behavior: Behavior normal.         Thought Content: Thought content normal.         Judgment: Judgment normal.        Assessment/Plan:     1. Pulmonary fibrosis (HCC)  2. Paralyzed hemidiaphragm  3. VESTA on CPAP       Cathy Dsouza has good benefit and adherence on PAP therapy. Compliance report information was analyzed to assess complexity and medical decision making in regards to further testing and management. Will prescribe home medical equipment company to check pressures, download usage and will replace mask, tubing, disposables and filters as needed. Instructed to wash or wipe face of excess oil before using CPAP to prevent the mask / nasal pillow from sliding and ensure proper fit. Empty the water daily and allow the chamber and tubings to air dry. Instructed how to properly clean the device to prevent bacteria and mold growth in the water chamber. Advised to avoid driving if too sleepy to function safely and given a discussion of the risks of untreated apneas such as accidents, cognitive impairment, mood impairment, worsening high blood pressure, various cardiac disease and stroke. Regarding obesity, recommend to try a formal program and/or increase physical activity by adding a 30 minute walk to daily routine. Weight loss was encouraged as a long term approach to treatment of VESTA. Explained the correlation between obesity and apnea and the causative role it can play. Follow up 6 months or sooner for any issues. Prior pulmonary OV note, PFT report and recent chest imaging report reviewed. I have personally reviewed and summarized the old records and/or obtained further history from someone other than the patient. Reviewed present meds and side effects. He is to continue oxygen therapy and Esbriet for his PF. Will get liver function test.     Discussed when to call with worsening symptoms such as increased shortness of breath, productive cough, wheezing or symptoms not responding to treatment plan.             CARLOTTA Beck - CNP

## 2022-11-30 NOTE — PATIENT INSTRUCTIONS
Sulaiman Jim has good benefit and adherence on PAP therapy. Compliance report information was analyzed to assess complexity and medical decision making in regards to further testing and management. Will prescribe home medical equipment company to check pressures, download usage and will replace mask, tubing, disposables and filters as needed. Instructed to wash or wipe face of excess oil before using CPAP to prevent the mask / nasal pillow from sliding and ensure proper fit. Empty the water daily and allow the chamber and tubings to air dry. Instructed how to properly clean the device to prevent bacteria and mold growth in the water chamber. Advised to avoid driving if too sleepy to function safely and given a discussion of the risks of untreated apneas such as accidents, cognitive impairment, mood impairment, worsening high blood pressure, various cardiac disease and stroke. Regarding obesity, recommend to try a formal program and/or increase physical activity by adding a 30 minute walk to daily routine. Weight loss was encouraged as a long term approach to treatment of VESTA. Explained the correlation between obesity and apnea and the causative role it can play. Call with worsening symptoms such as increased shortness of breath, productive cough, wheezing or symptoms not responding to treatment plan. Follow up 6 months or sooner for any issues. Get liver blood work completed     Remember to bring a list of pulmonary medications and any CPAP or BiPAP machines to your next appointment with the office. Please keep all of your future appointments scheduled by [address], [facility] office. Out of respect for other patients and providers, you may be asked to reschedule your appointment if you arrive later than your scheduled appointment time. Appointments cancelled less than 24hrs in advance will be considered a no show.  Patients with three missed appointments within 1 year or four missed appointments within 2 years can be dismissed from the practice. Please be aware that our physicians are required to work in the Intensive Care Unit at [facility].  Your appointment may need to be rescheduled if they are designated to work during your appointment time. You may receive a survey regarding the care you received during your visit. Your input is valuable to us. We encourage you to complete and return your survey. We hope you will choose us in the future for your healthcare needs. Pt instructed of all future appointment dates & times, including radiology, labs, procedures & referrals. If procedures were scheduled preparation instructions provided. Instructions on future appointments with [facility] were given.

## 2022-12-02 RX ORDER — PIRFENIDONE 801 MG/1
1 TABLET, FILM COATED ORAL
Qty: 90 TABLET | Refills: 11 | Status: SHIPPED | OUTPATIENT
Start: 2022-12-02

## 2023-01-09 ENCOUNTER — TELEPHONE (OUTPATIENT)
Dept: PULMONOLOGY | Age: 83
End: 2023-01-09

## 2023-01-09 NOTE — TELEPHONE ENCOUNTER
I called and s/w Mami from [address]. She said they had been in contact with patient. They do not deliver unless it is a minimum of 6 6 tanks. Anything less than that, pt will need to come in and  himself. Pt just needs to call them. SW wife and relayed message above. She states he needs 14 tanks, not 1. She will call them.

## 2023-01-09 NOTE — TELEPHONE ENCOUNTER
PORTABLE OXYGEN is still not arrived, patient has not heard anything about it. They were suppose to deliver tanks 1-6-2023 and staff have not heard anything from [address]. Patient has attempted to call the DME but no luck due to them saying that she needed to contact our office.

## 2023-01-16 ENCOUNTER — TELEPHONE (OUTPATIENT)
Dept: PULMONOLOGY | Age: 83
End: 2023-01-16

## 2023-01-16 NOTE — TELEPHONE ENCOUNTER
Nayeli glaser [facility] is calling about some medications for this patient. Please call her back.  Thanks

## 2023-01-16 NOTE — TELEPHONE ENCOUNTER
Veronika Moy said pt is on O2 24/7 and is still having issues with SOB. They want to know if Dr. Gil Dias would prescribe Woody Mare for patient. They are sending over a fax request for this.   I will forward it to Dr. Gil Dias when it arrives

## 2023-02-28 ENCOUNTER — HOSPITAL ENCOUNTER (EMERGENCY)
Age: 83
Discharge: HOME HEALTH | End: 2023-02-28
Attending: EMERGENCY MEDICINE
Payer: MEDICARE

## 2023-02-28 VITALS
DIASTOLIC BLOOD PRESSURE: 55 MMHG | OXYGEN SATURATION: 99 % | WEIGHT: 256 LBS | SYSTOLIC BLOOD PRESSURE: 110 MMHG | HEART RATE: 99 BPM | TEMPERATURE: 98 F | BODY MASS INDEX: 36.73 KG/M2 | RESPIRATION RATE: 16 BRPM

## 2023-02-28 DIAGNOSIS — H81.01 MENIERE DISEASE, RIGHT: ICD-10-CM

## 2023-02-28 DIAGNOSIS — H93.8X1 SENSATION OF FULLNESS IN RIGHT EAR: Primary | ICD-10-CM

## 2023-02-28 PROCEDURE — 6370000000 HC RX 637 (ALT 250 FOR IP): Performed by: EMERGENCY MEDICINE

## 2023-02-28 PROCEDURE — 6360000002 HC RX W HCPCS: Performed by: EMERGENCY MEDICINE

## 2023-02-28 PROCEDURE — 99284 EMERGENCY DEPT VISIT MOD MDM: CPT

## 2023-02-28 RX ORDER — MECLIZINE HCL 12.5 MG/1
12.5 TABLET ORAL 3 TIMES DAILY PRN
Qty: 15 TABLET | Refills: 0 | Status: SHIPPED | OUTPATIENT
Start: 2023-02-28 | End: 2023-03-10

## 2023-02-28 RX ORDER — MECLIZINE HCL 12.5 MG/1
25 TABLET ORAL ONCE
Status: COMPLETED | OUTPATIENT
Start: 2023-02-28 | End: 2023-02-28

## 2023-02-28 RX ORDER — DEXAMETHASONE 4 MG/1
10 TABLET ORAL ONCE
Status: COMPLETED | OUTPATIENT
Start: 2023-02-28 | End: 2023-02-28

## 2023-02-28 RX ADMIN — MECLIZINE 25 MG: 12.5 TABLET ORAL at 17:20

## 2023-02-28 RX ADMIN — DEXAMETHASONE 10 MG: 4 TABLET ORAL at 17:20

## 2023-02-28 ASSESSMENT — PAIN - FUNCTIONAL ASSESSMENT: PAIN_FUNCTIONAL_ASSESSMENT: 0-10

## 2023-02-28 NOTE — ED PROVIDER NOTES
[address] [facility]  ED  EMERGENCY DEPARTMENT ENCOUNTER        Patient Name: Florentin Tafoya  MRN: [id number]  [place] 1940  Date of evaluation: 2/28/2023  Provider: Jolene Quintanilla MD  PCP: Lauren Serrano MD  Note Started: 5:04 PM EST 2/28/23    CHIEF COMPLAINT       Ear Problem (Lost hearing in right ear yesterday, has become \" wobbly\" since yesterday)      HISTORY OF PRESENT ILLNESS: 1 or more Elements     History from : Patient    Limitations to history : None    Florentin Tafoya is a 81 y.o. male who presents for evaluation of decreased sided hearing. He states starting several day ago, he lost hearing in the right ear. He states that he has become \"wobbly\" since that time. No falls or head injury. No fevers. No URI symptoms. He denies any weakness in his extremities. Has not recently seen an ear nose and throat doctor. Nursing Notes were all reviewed and agreed with or any disagreements were addressed in the HPI. REVIEW OF SYSTEMS :      Review of Systems    Positives and Pertinent negatives as per HPI. SURGICAL HISTORY     Past Surgical History:   Procedure Laterality Date    CATARACT REMOVAL WITH IMPLANT      bilateral eyes    CERVICAL DISCECTOMY      and fusion (anterior)    CYST REMOVAL      from the buttocks    FEMORAL BYPASS      bi-femoral bypass    HERNIA REPAIR      x 2    JOINT REPLACEMENT      bilateral knee replacement    NASAL SEPTUM SURGERY      SHOULDER SURGERY      left    TESTICLE REMOVAL      left    TOE SURGERY         CURRENTMEDICATIONS       Discharge Medication List as of 2/28/2023  6:04 PM        CONTINUE these medications which have NOT CHANGED    Details   !!  Pirfenidone 801 MG TABS Take 1 tablet by mouth 3 times daily (with meals), Disp-90 tablet, R-11Normal      pioglitazone (ACTOS) 45 MG tablet Historical Med      nortriptyline (PAMELOR) 50 MG capsule Historical Med      zolpidem (AMBIEN) 10 MG tablet Historical Med      QUEtiapine (SEROQUEL) 25 MG tablet Historical Med      omeprazole (PRILOSEC) 40 MG delayed release capsule Historical Med      atorvastatin (LIPITOR) 80 MG tablet TAKE 1 TABLET BY MOUTH EVERY DAYHistorical Med      ELIQUIS 2.5 MG TABS tablet DAWHistorical Med      !! ESBRIET 801 MG TABS TAKE 1 TABLET THREE TIMES A DAY WITH FOOD, Disp-90 tablet, R-11, DAWNormal      amLODIPine (NORVASC) 5 MG tablet TAKE 1 TABLET BY MOUTH EVERY DAY, Disp-90 tablet, R-0      isosorbide mononitrate (IMDUR) 30 MG CR tablet TAKE 1 TABLET BY MOUTH EVERY DAY, Disp-30 tablet, R-5      Multiple Vitamins-Minerals (THERAPEUTIC MULTIVITAMIN-MINERALS) tablet Take 1 tablet by mouth daily. furosemide (LASIX) 40 MG tablet Take 40 mg by mouth every morning. rosuvastatin (CRESTOR) 20 MG tablet Take 20 mg by mouth every evening. Historical Med      diclofenac (VOLTAREN) 75 MG EC tablet Take 75 mg by mouth 2 times daily. clonazepam (KLONOPIN) 1 MG disintegrating tablet Take 1 mg by mouth nightly as needed. SITagliptin (JANUVIA) 100 MG tablet Take 100 mg by mouth daily. Historical Med      ezetimibe (ZETIA) 10 MG tablet Take 10 mg by mouth every evening. aspirin 81 MG tablet Take 81 mg by mouth daily. !! - Potential duplicate medications found. Please discuss with provider. ALLERGIES     Azithromycin    FAMILYHISTORY     History reviewed. No pertinent family history.      SOCIAL HISTORY       Social History     Tobacco Use    Smoking status: Former     Packs/day: 4.00     Years: 55.00     Pack years: 220.00     Types: Cigarettes     Quit date: 2003     Years since quittin.1    Smokeless tobacco: Former     Types: Chew   Vaping Use    Vaping Use: Never used   Substance Use Topics    Alcohol use: Yes     Comment: occasionally    Drug use: No       SCREENINGS        Henry Coma Scale  Eye Opening: Spontaneous  Best Verbal Response: Oriented  Best Motor Response: Obeys commands  Peterson Coma Scale Score: 15 [facility] Assessment  BP: (!) 110/55  Heart Rate: 99           PHYSICAL EXAM  1 or more Elements     ED Triage Vitals [02/28/23 1657]   BP Temp Temp Source Heart Rate Resp SpO2 Height Weight   (!) 107/52 97.8 °F (36.6 °C) Oral (!) 105 20 99 % -- 256 lb (116.1 kg)       General: No acute distress. Alert and Oriented. Appears stated age. HEENT:  No difficulty tolerating oral secretions. He has diminished hearing bilaterally. There is no obvious signs of otitis media of the right ear there is no opacification of the membrane. There is obstruction of the left tympanic membrane by impacted cerumen  Cardiac: Regular rate  Chest: No respiratory distress. No use of accessory muscles for respiration. Extremities:No significant lower extremity edema. Lower extremities are symmetric. Neuro: Moving all extremities. No focal deficits. Speech is clear. Intact finger-to-nose testing  Skin:No rash, no erythema  Psych: Calm and cooperative. DIAGNOSTIC RESULTS   LABS:    Labs Reviewed - No data to display    When ordered only abnormal lab results are displayed. All other labs were within normal range or not returned as of this dictation. RADIOLOGY:   Non-plain film images such as CT, Ultrasound and MRI are read by the radiologist. Plain radiographic images are visualized and preliminarily interpreted by the ED Provider with the below findings:      Interpretation per the Radiologist below, if available at the time of this note:    No orders to display     No results found. Bedside Ultrasound, as interpreted by me, if performed:    No results found.     PROCEDURES     Unless otherwise noted below, none     Procedures    CRITICAL CARE TIME     I personally spent a total of 0 minutes of critical care time in obtaining history, performing a physical exam, bedside monitoring of interventions, collecting and interpreting tests and discussion with consultants but excluding time spent performing procedures, treating other patients and teaching time.                                                                                                           PAST MEDICAL HISTORY      has a past medical history of AAA (abdominal aortic aneurysm), Diabetes mellitus (HCC), Hyperlipidemia, Hypertension, Pulmonary embolus (HCC), and Pulmonary fibrosis (HCC).     EMERGENCY DEPARTMENT COURSE and DIFFERENTIAL DIAGNOSIS/MDM:     Vitals:    Vitals:    02/28/23 1657 02/28/23 1800   BP: (!) 107/52 (!) 110/55   Pulse: (!) 105 99   Resp: 20 16   Temp: 97.8 °F (36.6 °C) 98 °F (36.7 °C)   TempSrc: Oral Oral   SpO2: 99% 99%   Weight: 256 lb (116.1 kg)        Patient was treated with and given the following medications:  Medications   dexamethasone (DECADRON) tablet 10 mg (10 mg Oral Given 2/28/23 1720)   meclizine (ANTIVERT) tablet 25 mg (25 mg Oral Given 2/28/23 1720)               CC/HPI Summary, DDx, ED Course, and Reassessment:     82-year-old male presenting for evaluation of decreased right hearing, sensation of gait unsteadiness.  Patient has no focal neurological symptoms.  He has stable vital signs.  He has no findings of otitis media.  Suspect that his symptoms could be related to either middle ear effusion versus Ménière's disease.  He was given dose of Decadron 10 mg emergency department he will also be initiated on meclizine.  He was made referral for ENT physician for further evaluation management.  He was advised to return to the emergency department if he develops any focal weakness or worsening symptoms.    CONSULTS: (Who and What was discussed)  None    The patient will be discharged from the emergency department. The patient was counseled on their diagnosis and any medications prescribed. They were advised on the need for PCP followup. They were counseled on the need to return to the emergency department if any of their symptoms were to worsen, change or have any other concerns. Discharged in stable condition.       I am the  Primary Clinician of Record. FINAL IMPRESSION      1. Sensation of fullness in right ear    2. Meniere disease, right          DISPOSITION/PLAN     DISPOSITION Decision To Discharge 02/28/2023 06:14:54 PM      PATIENT REFERRED TO:  Arianne Al MD  [address] [place] [address] Isidro Damian  [phone]    Schedule an appointment as soon as possible for a visit       Arianne Al MD  [address] [place] [address]  [phone]          DISCHARGE MEDICATIONS:  Patient was given scripts for the following medications. I counseled patient how to take these medications:  Discharge Medication List as of 2/28/2023  6:04 PM        START taking these medications    Details   meclizine (ANTIVERT) 12.5 MG tablet Take 1 tablet by mouth 3 times daily as needed for Dizziness, Disp-15 tablet, R-0Normal             DISCONTINUED MEDICATIONS:  Discharge Medication List as of 2/28/2023  6:04 PM                 (This chart was generated in part by using Dragon Dictation system and may contain errors related to that system including errors in grammar, punctuation, and spelling, as well as words and phrases that may be inappropriate.  If there are any questions or concerns please feel free to contact the dictating provider for clarification.)    MD Abundio Bell MD  02/28/23 1471

## 2023-02-28 NOTE — ED NOTES
Patient left via personal vehicle to private residence in stable condition. Patients vitals were assessed before discharge and were stable. Patient verbalized understanding of discharge instructions, follow up and medications. RN explained information in discharge packet and patient verbalized they have no questions at this time. Patient left ER with all paperwork and belongings that RN is aware of.         Zach Quinonez RN  02/28/23 2315

## 2023-03-27 ENCOUNTER — HOSPITAL ENCOUNTER (INPATIENT)
Age: 83
LOS: 3 days | Discharge: NURSING, REHAB OR LONG-TERM CARE | DRG: 480 | End: 2023-03-30
Attending: EMERGENCY MEDICINE | Admitting: HOSPITALIST
Payer: MEDICARE

## 2023-03-27 ENCOUNTER — APPOINTMENT (OUTPATIENT)
Dept: GENERAL RADIOLOGY | Age: 83
DRG: 480 | End: 2023-03-27
Payer: MEDICARE

## 2023-03-27 ENCOUNTER — APPOINTMENT (OUTPATIENT)
Dept: CT IMAGING | Age: 83
DRG: 480 | End: 2023-03-27
Payer: MEDICARE

## 2023-03-27 DIAGNOSIS — Z79.01 ON ANTICOAGULANT THERAPY: ICD-10-CM
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
## 2023-03-27 DIAGNOSIS — S72.002A CLOSED FRACTURE OF LEFT HIP, INITIAL ENCOUNTER (HCC): Primary | ICD-10-CM

## 2023-03-27 DIAGNOSIS — Z23 NEED FOR DIPHTHERIA-TETANUS-PERTUSSIS (TDAP) VACCINE: ICD-10-CM

## 2023-03-27 DIAGNOSIS — S80.212A ABRASION, LEFT KNEE, INITIAL ENCOUNTER: ICD-10-CM

## 2023-03-27 PROBLEM — R09.89 PULMONARY VENOUS CONGESTION: Status: ACTIVE | Noted: 2023-03-27

## 2023-03-27 PROBLEM — J98.4 RESTRICTIVE LUNG DISEASE: Status: ACTIVE | Noted: 2023-03-27

## 2023-03-27 PROBLEM — S72.012A CLOSED SUBCAPITAL FRACTURE OF FEMUR, LEFT, INITIAL ENCOUNTER (HCC): Status: ACTIVE | Noted: 2023-03-27

## 2023-03-27 PROBLEM — D64.9 ANEMIA: Status: ACTIVE | Noted: 2023-03-27

## 2023-03-27 PROBLEM — S72.012A SUBCAPITAL FRACTURE OF FEMUR, LEFT, CLOSED, INITIAL ENCOUNTER (HCC): Status: ACTIVE | Noted: 2023-03-27

## 2023-03-27 LAB
ABO + RH BLD: NORMAL
ALBUMIN SERPL-MCNC: 3.4 G/DL (ref 3.4–5)
ALBUMIN/GLOB SERPL: 1 {RATIO} (ref 1.1–2.2)
ALP SERPL-CCNC: 122 U/L (ref 40–129)
ALT SERPL-CCNC: 13 U/L (ref 10–40)
ANION GAP SERPL CALCULATED.3IONS-SCNC: 7 MMOL/L (ref 3–16)
ANION GAP SERPL CALCULATED.3IONS-SCNC: 8 MMOL/L (ref 3–16)
APTT BLD: 45.4 SEC (ref 23–34.3)
AST SERPL-CCNC: 20 U/L (ref 15–37)
BASOPHILS # BLD: 0 K/UL (ref 0–0.2)
BASOPHILS NFR BLD: 0.4 %
BILIRUB SERPL-MCNC: 0.4 MG/DL (ref 0–1)
BILIRUB UR QL STRIP.AUTO: NEGATIVE
BLD GP AB SCN SERPL QL: NORMAL
BLD GP AB SCN SERPL QL: NORMAL
BUN SERPL-MCNC: 22 MG/DL (ref 7–20)
BUN SERPL-MCNC: 22 MG/DL (ref 7–20)
CALCIUM SERPL-MCNC: 9.2 MG/DL (ref 8.3–10.6)
CALCIUM SERPL-MCNC: 9.3 MG/DL (ref 8.3–10.6)
CHLORIDE SERPL-SCNC: 99 MMOL/L (ref 99–110)
CHLORIDE SERPL-SCNC: 99 MMOL/L (ref 99–110)
CLARITY UR: CLEAR
CO2 SERPL-SCNC: 30 MMOL/L (ref 21–32)
CO2 SERPL-SCNC: 33 MMOL/L (ref 21–32)
COLOR UR: YELLOW
CREAT SERPL-MCNC: 1 MG/DL (ref 0.8–1.3)
CREAT SERPL-MCNC: 1 MG/DL (ref 0.8–1.3)
DEPRECATED RDW RBC AUTO: 12.8 % (ref 12.4–15.4)
EKG ATRIAL RATE: 91 BPM
EKG DIAGNOSIS: NORMAL
EKG P AXIS: 38 DEGREES
EKG P-R INTERVAL: 202 MS
EKG Q-T INTERVAL: 378 MS
EKG QRS DURATION: 88 MS
EKG QTC CALCULATION (BAZETT): 464 MS
EKG R AXIS: 21 DEGREES
EKG T AXIS: 35 DEGREES
EKG VENTRICULAR RATE: 91 BPM
EOSINOPHIL # BLD: 0.4 K/UL (ref 0–0.6)
EOSINOPHIL NFR BLD: 4.4 %
GFR SERPLBLD CREATININE-BSD FMLA CKD-EPI: >60 ML/MIN/{1.73_M2}
GFR SERPLBLD CREATININE-BSD FMLA CKD-EPI: >60 ML/MIN/{1.73_M2}
GLUCOSE BLD-MCNC: 129 MG/DL (ref 70–99)
GLUCOSE SERPL-MCNC: 122 MG/DL (ref 70–99)
GLUCOSE SERPL-MCNC: 149 MG/DL (ref 70–99)
GLUCOSE UR STRIP.AUTO-MCNC: NEGATIVE MG/DL
HCT VFR BLD AUTO: 35.6 % (ref 40.5–52.5)
HGB BLD-MCNC: 12.2 G/DL (ref 13.5–17.5)
HGB UR QL STRIP.AUTO: NEGATIVE
INR PPP: 1.07 (ref 0.87–1.14)
INR PPP: >16.29 (ref 0.87–1.14)
KETONES UR STRIP.AUTO-MCNC: NEGATIVE MG/DL
LEUKOCYTE ESTERASE UR QL STRIP.AUTO: NEGATIVE
LYMPHOCYTES # BLD: 2.9 K/UL (ref 1–5.1)
LYMPHOCYTES NFR BLD: 28.2 %
MCH RBC QN AUTO: 31.7 PG (ref 26–34)
MCHC RBC AUTO-ENTMCNC: 34.1 G/DL (ref 31–36)
MCV RBC AUTO: 92.9 FL (ref 80–100)
MONOCYTES # BLD: 0.5 K/UL (ref 0–1.3)
MONOCYTES NFR BLD: 5 %
NEUTROPHILS # BLD: 6.3 K/UL (ref 1.7–7.7)
NEUTROPHILS NFR BLD: 62 %
NITRITE UR QL STRIP.AUTO: NEGATIVE
PERFORMED ON: ABNORMAL
PH UR STRIP.AUTO: 6 [PH] (ref 5–8)
PLATELET # BLD AUTO: 230 K/UL (ref 135–450)
PMV BLD AUTO: 7 FL (ref 5–10.5)
POTASSIUM SERPL-SCNC: 4.1 MMOL/L (ref 3.5–5.1)
POTASSIUM SERPL-SCNC: 5.3 MMOL/L (ref 3.5–5.1)
PROT SERPL-MCNC: 6.8 G/DL (ref 6.4–8.2)
PROT UR STRIP.AUTO-MCNC: NEGATIVE MG/DL
PROTHROMBIN TIME: 13.9 SEC (ref 11.7–14.5)
PROTHROMBIN TIME: >120 SEC (ref 11.7–14.5)
RBC # BLD AUTO: 3.84 M/UL (ref 4.2–5.9)
SODIUM SERPL-SCNC: 137 MMOL/L (ref 136–145)
SODIUM SERPL-SCNC: 139 MMOL/L (ref 136–145)
SP GR UR STRIP.AUTO: 1.01 (ref 1–1.03)
UA COMPLETE W REFLEX CULTURE PNL UR: NORMAL
UA DIPSTICK W REFLEX MICRO PNL UR: NORMAL
URN SPEC COLLECT METH UR: NORMAL
UROBILINOGEN UR STRIP-ACNC: 0.2 E.U./DL
WBC # BLD AUTO: 10.1 K/UL (ref 4–11)

## 2023-03-27 PROCEDURE — 6360000002 HC RX W HCPCS: Performed by: PHYSICIAN ASSISTANT

## 2023-03-27 PROCEDURE — 80053 COMPREHEN METABOLIC PANEL: CPT

## 2023-03-27 PROCEDURE — 72125 CT NECK SPINE W/O DYE: CPT

## 2023-03-27 PROCEDURE — 6370000000 HC RX 637 (ALT 250 FOR IP): Performed by: NURSE PRACTITIONER

## 2023-03-27 PROCEDURE — 6370000000 HC RX 637 (ALT 250 FOR IP): Performed by: HOSPITALIST

## 2023-03-27 PROCEDURE — 83036 HEMOGLOBIN GLYCOSYLATED A1C: CPT

## 2023-03-27 PROCEDURE — 71045 X-RAY EXAM CHEST 1 VIEW: CPT

## 2023-03-27 PROCEDURE — 81003 URINALYSIS AUTO W/O SCOPE: CPT

## 2023-03-27 PROCEDURE — 1200000000 HC SEMI PRIVATE

## 2023-03-27 PROCEDURE — 99285 EMERGENCY DEPT VISIT HI MDM: CPT

## 2023-03-27 PROCEDURE — 99223 1ST HOSP IP/OBS HIGH 75: CPT | Performed by: INTERNAL MEDICINE

## 2023-03-27 PROCEDURE — 86901 BLOOD TYPING SEROLOGIC RH(D): CPT

## 2023-03-27 PROCEDURE — 93005 ELECTROCARDIOGRAM TRACING: CPT | Performed by: PHYSICIAN ASSISTANT

## 2023-03-27 PROCEDURE — 90471 IMMUNIZATION ADMIN: CPT | Performed by: PHYSICIAN ASSISTANT

## 2023-03-27 PROCEDURE — 73562 X-RAY EXAM OF KNEE 3: CPT

## 2023-03-27 PROCEDURE — 96374 THER/PROPH/DIAG INJ IV PUSH: CPT

## 2023-03-27 PROCEDURE — 73502 X-RAY EXAM HIP UNI 2-3 VIEWS: CPT

## 2023-03-27 PROCEDURE — 86900 BLOOD TYPING SEROLOGIC ABO: CPT

## 2023-03-27 PROCEDURE — 85730 THROMBOPLASTIN TIME PARTIAL: CPT

## 2023-03-27 PROCEDURE — 2580000003 HC RX 258: Performed by: HOSPITALIST

## 2023-03-27 PROCEDURE — 93010 ELECTROCARDIOGRAM REPORT: CPT | Performed by: INTERNAL MEDICINE

## 2023-03-27 PROCEDURE — 70450 CT HEAD/BRAIN W/O DYE: CPT

## 2023-03-27 PROCEDURE — 85610 PROTHROMBIN TIME: CPT

## 2023-03-27 PROCEDURE — 94660 CPAP INITIATION&MGMT: CPT

## 2023-03-27 PROCEDURE — 90715 TDAP VACCINE 7 YRS/> IM: CPT | Performed by: PHYSICIAN ASSISTANT

## 2023-03-27 PROCEDURE — 6360000002 HC RX W HCPCS: Performed by: HOSPITALIST

## 2023-03-27 PROCEDURE — 85025 COMPLETE CBC W/AUTO DIFF WBC: CPT

## 2023-03-27 PROCEDURE — 73700 CT LOWER EXTREMITY W/O DYE: CPT

## 2023-03-27 PROCEDURE — 86850 RBC ANTIBODY SCREEN: CPT

## 2023-03-27 RX ORDER — INSULIN LISPRO 100 [IU]/ML
0-4 INJECTION, SOLUTION INTRAVENOUS; SUBCUTANEOUS NIGHTLY
Status: DISCONTINUED | OUTPATIENT
Start: 2023-03-27 | End: 2023-03-27

## 2023-03-27 RX ORDER — M-VIT,TX,IRON,MINS/CALC/FOLIC 27MG-0.4MG
1 TABLET ORAL DAILY
Status: DISCONTINUED | OUTPATIENT
Start: 2023-03-27 | End: 2023-03-30 | Stop reason: HOSPADM

## 2023-03-27 RX ORDER — POTASSIUM CHLORIDE 20 MEQ/1
40 TABLET, EXTENDED RELEASE ORAL PRN
Status: DISCONTINUED | OUTPATIENT
Start: 2023-03-27 | End: 2023-03-30

## 2023-03-27 RX ORDER — DEXTROSE MONOHYDRATE 100 MG/ML
INJECTION, SOLUTION INTRAVENOUS CONTINUOUS PRN
Status: DISCONTINUED | OUTPATIENT
Start: 2023-03-27 | End: 2023-03-27 | Stop reason: SDUPTHER

## 2023-03-27 RX ORDER — MORPHINE SULFATE 4 MG/ML
4 INJECTION, SOLUTION INTRAMUSCULAR; INTRAVENOUS ONCE
Status: COMPLETED | OUTPATIENT
Start: 2023-03-27 | End: 2023-03-27

## 2023-03-27 RX ORDER — ACETAMINOPHEN 325 MG/1
650 TABLET ORAL EVERY 6 HOURS PRN
Status: DISCONTINUED | OUTPATIENT
Start: 2023-03-27 | End: 2023-03-30 | Stop reason: HOSPADM

## 2023-03-27 RX ORDER — DEXTROSE MONOHYDRATE 100 MG/ML
INJECTION, SOLUTION INTRAVENOUS CONTINUOUS PRN
Status: DISCONTINUED | OUTPATIENT
Start: 2023-03-27 | End: 2023-03-30 | Stop reason: HOSPADM

## 2023-03-27 RX ORDER — INSULIN LISPRO 100 [IU]/ML
0-8 INJECTION, SOLUTION INTRAVENOUS; SUBCUTANEOUS EVERY 4 HOURS
Status: DISCONTINUED | OUTPATIENT
Start: 2023-03-27 | End: 2023-03-29 | Stop reason: ALTCHOICE

## 2023-03-27 RX ORDER — FUROSEMIDE 40 MG/1
40 TABLET ORAL EVERY MORNING
Status: DISCONTINUED | OUTPATIENT
Start: 2023-03-28 | End: 2023-03-30 | Stop reason: HOSPADM

## 2023-03-27 RX ORDER — INSULIN GLARGINE 100 [IU]/ML
24 INJECTION, SOLUTION SUBCUTANEOUS 2 TIMES DAILY
Status: DISCONTINUED | OUTPATIENT
Start: 2023-03-28 | End: 2023-03-30 | Stop reason: HOSPADM

## 2023-03-27 RX ORDER — ONDANSETRON 4 MG/1
4 TABLET, ORALLY DISINTEGRATING ORAL EVERY 8 HOURS PRN
Status: DISCONTINUED | OUTPATIENT
Start: 2023-03-27 | End: 2023-03-30 | Stop reason: HOSPADM

## 2023-03-27 RX ORDER — FUROSEMIDE 10 MG/ML
20 INJECTION INTRAMUSCULAR; INTRAVENOUS ONCE
Status: COMPLETED | OUTPATIENT
Start: 2023-03-27 | End: 2023-03-27

## 2023-03-27 RX ORDER — OXYCODONE AND ACETAMINOPHEN 7.5; 325 MG/1; MG/1
1 TABLET ORAL ONCE
Status: COMPLETED | OUTPATIENT
Start: 2023-03-28 | End: 2023-03-27

## 2023-03-27 RX ORDER — SENNA PLUS 8.6 MG/1
1 TABLET ORAL DAILY PRN
Status: DISCONTINUED | OUTPATIENT
Start: 2023-03-27 | End: 2023-03-30 | Stop reason: HOSPADM

## 2023-03-27 RX ORDER — CELECOXIB 200 MG/1
200 CAPSULE ORAL 2 TIMES DAILY
COMMUNITY

## 2023-03-27 RX ORDER — LIDOCAINE 4 G/G
1 PATCH TOPICAL DAILY
Status: DISCONTINUED | OUTPATIENT
Start: 2023-03-27 | End: 2023-03-30 | Stop reason: HOSPADM

## 2023-03-27 RX ORDER — INSULIN GLARGINE 100 [IU]/ML
10 INJECTION, SOLUTION SUBCUTANEOUS ONCE
Status: DISCONTINUED | OUTPATIENT
Start: 2023-03-27 | End: 2023-03-30 | Stop reason: HOSPADM

## 2023-03-27 RX ORDER — GABAPENTIN 300 MG/1
300 CAPSULE ORAL EVERY EVENING
COMMUNITY

## 2023-03-27 RX ORDER — ISOSORBIDE MONONITRATE 30 MG/1
30 TABLET, EXTENDED RELEASE ORAL DAILY
Status: DISCONTINUED | OUTPATIENT
Start: 2023-03-28 | End: 2023-03-30 | Stop reason: HOSPADM

## 2023-03-27 RX ORDER — INSULIN LISPRO 100 [IU]/ML
0-8 INJECTION, SOLUTION INTRAVENOUS; SUBCUTANEOUS
Status: DISCONTINUED | OUTPATIENT
Start: 2023-03-27 | End: 2023-03-27

## 2023-03-27 RX ORDER — ATORVASTATIN CALCIUM 40 MG/1
40 TABLET, FILM COATED ORAL NIGHTLY
Status: DISCONTINUED | OUTPATIENT
Start: 2023-03-27 | End: 2023-03-30 | Stop reason: HOSPADM

## 2023-03-27 RX ORDER — HYDROMORPHONE HYDROCHLORIDE 2 MG/1
0.5 TABLET ORAL
Status: DISCONTINUED | OUTPATIENT
Start: 2023-03-27 | End: 2023-03-28

## 2023-03-27 RX ORDER — ONDANSETRON 2 MG/ML
4 INJECTION INTRAMUSCULAR; INTRAVENOUS EVERY 6 HOURS PRN
Status: DISCONTINUED | OUTPATIENT
Start: 2023-03-27 | End: 2023-03-30 | Stop reason: HOSPADM

## 2023-03-27 RX ORDER — GABAPENTIN 300 MG/1
300 CAPSULE ORAL EVERY EVENING
Status: DISCONTINUED | OUTPATIENT
Start: 2023-03-27 | End: 2023-03-30 | Stop reason: HOSPADM

## 2023-03-27 RX ORDER — PIRFENIDONE 801 MG/1
1 TABLET, FILM COATED ORAL
Status: DISCONTINUED | OUTPATIENT
Start: 2023-03-27 | End: 2023-03-30 | Stop reason: HOSPADM

## 2023-03-27 RX ORDER — ONDANSETRON 2 MG/ML
4 INJECTION INTRAMUSCULAR; INTRAVENOUS ONCE
Status: COMPLETED | OUTPATIENT
Start: 2023-03-27 | End: 2023-03-27

## 2023-03-27 RX ORDER — SODIUM CHLORIDE 0.9 % (FLUSH) 0.9 %
10 SYRINGE (ML) INJECTION PRN
Status: DISCONTINUED | OUTPATIENT
Start: 2023-03-27 | End: 2023-03-30 | Stop reason: HOSPADM

## 2023-03-27 RX ORDER — AMLODIPINE BESYLATE 5 MG/1
5 TABLET ORAL DAILY
Status: DISCONTINUED | OUTPATIENT
Start: 2023-03-28 | End: 2023-03-30 | Stop reason: HOSPADM

## 2023-03-27 RX ORDER — SODIUM CHLORIDE 0.9 % (FLUSH) 0.9 %
10 SYRINGE (ML) INJECTION EVERY 12 HOURS SCHEDULED
Status: DISCONTINUED | OUTPATIENT
Start: 2023-03-27 | End: 2023-03-30 | Stop reason: HOSPADM

## 2023-03-27 RX ORDER — NORTRIPTYLINE HYDROCHLORIDE 25 MG/1
50 CAPSULE ORAL NIGHTLY
Status: DISCONTINUED | OUTPATIENT
Start: 2023-03-27 | End: 2023-03-30 | Stop reason: HOSPADM

## 2023-03-27 RX ORDER — MAGNESIUM SULFATE IN WATER 40 MG/ML
2000 INJECTION, SOLUTION INTRAVENOUS PRN
Status: DISCONTINUED | OUTPATIENT
Start: 2023-03-27 | End: 2023-03-30

## 2023-03-27 RX ORDER — PANTOPRAZOLE SODIUM 40 MG/1
40 TABLET, DELAYED RELEASE ORAL
Status: DISCONTINUED | OUTPATIENT
Start: 2023-03-28 | End: 2023-03-30 | Stop reason: HOSPADM

## 2023-03-27 RX ORDER — POTASSIUM CHLORIDE 7.45 MG/ML
10 INJECTION INTRAVENOUS PRN
Status: DISCONTINUED | OUTPATIENT
Start: 2023-03-27 | End: 2023-03-30

## 2023-03-27 RX ORDER — ASPIRIN 81 MG/1
81 TABLET ORAL DAILY
Status: DISCONTINUED | OUTPATIENT
Start: 2023-03-29 | End: 2023-03-30 | Stop reason: HOSPADM

## 2023-03-27 RX ORDER — ACETAMINOPHEN 325 MG/1
650 TABLET ORAL EVERY 4 HOURS PRN
Status: DISCONTINUED | OUTPATIENT
Start: 2023-03-27 | End: 2023-03-27 | Stop reason: SDUPTHER

## 2023-03-27 RX ORDER — ACETAMINOPHEN 650 MG/1
650 SUPPOSITORY RECTAL EVERY 6 HOURS PRN
Status: DISCONTINUED | OUTPATIENT
Start: 2023-03-27 | End: 2023-03-30 | Stop reason: HOSPADM

## 2023-03-27 RX ORDER — SODIUM CHLORIDE 9 MG/ML
INJECTION, SOLUTION INTRAVENOUS PRN
Status: DISCONTINUED | OUTPATIENT
Start: 2023-03-27 | End: 2023-03-30 | Stop reason: HOSPADM

## 2023-03-27 RX ADMIN — NORTRIPTYLINE HYDROCHLORIDE 50 MG: 25 CAPSULE ORAL at 22:24

## 2023-03-27 RX ADMIN — HYDROMORPHONE HYDROCHLORIDE 0.5 MG: 2 TABLET ORAL at 19:46

## 2023-03-27 RX ADMIN — HYDROMORPHONE HYDROCHLORIDE 0.5 MG: 2 TABLET ORAL at 22:24

## 2023-03-27 RX ADMIN — FUROSEMIDE 20 MG: 10 INJECTION, SOLUTION INTRAMUSCULAR; INTRAVENOUS at 18:50

## 2023-03-27 RX ADMIN — GABAPENTIN 300 MG: 300 CAPSULE ORAL at 21:25

## 2023-03-27 RX ADMIN — TETANUS TOXOID, REDUCED DIPHTHERIA TOXOID AND ACELLULAR PERTUSSIS VACCINE, ADSORBED 0.5 ML: 5; 2.5; 8; 8; 2.5 SUSPENSION INTRAMUSCULAR at 18:57

## 2023-03-27 RX ADMIN — OXYCODONE AND ACETAMINOPHEN 1 TABLET: 7.5; 325 TABLET ORAL at 23:53

## 2023-03-27 RX ADMIN — FUROSEMIDE 4 MG/HR: 10 INJECTION, SOLUTION INTRAMUSCULAR; INTRAVENOUS at 18:55

## 2023-03-27 RX ADMIN — ATORVASTATIN CALCIUM 40 MG: 40 TABLET, FILM COATED ORAL at 21:25

## 2023-03-27 RX ADMIN — ONDANSETRON 4 MG: 2 INJECTION INTRAMUSCULAR; INTRAVENOUS at 17:58

## 2023-03-27 RX ADMIN — MORPHINE SULFATE 4 MG: 4 INJECTION, SOLUTION INTRAMUSCULAR; INTRAVENOUS at 17:59

## 2023-03-27 ASSESSMENT — PAIN DESCRIPTION - ORIENTATION
ORIENTATION: LEFT

## 2023-03-27 ASSESSMENT — PAIN - FUNCTIONAL ASSESSMENT: PAIN_FUNCTIONAL_ASSESSMENT: 0-10

## 2023-03-27 ASSESSMENT — PAIN DESCRIPTION - LOCATION
LOCATION: HIP

## 2023-03-27 ASSESSMENT — PAIN SCALES - GENERAL
PAINLEVEL_OUTOF10: 7
PAINLEVEL_OUTOF10: 4
PAINLEVEL_OUTOF10: 8
PAINLEVEL_OUTOF10: 9

## 2023-03-27 NOTE — ED NOTES
Attempted pedal pulse auscultation with doppler, unable to obtain pedal pulse but as able to get a posterior tibial pulse. SWAPNA Diop made aware.        Megan Nair RN  03/27/23 0122

## 2023-03-28 ENCOUNTER — APPOINTMENT (OUTPATIENT)
Dept: GENERAL RADIOLOGY | Age: 83
DRG: 480 | End: 2023-03-28
Payer: MEDICARE

## 2023-03-28 ENCOUNTER — ANESTHESIA EVENT (OUTPATIENT)
Dept: OPERATING ROOM | Age: 83
DRG: 480 | End: 2023-03-28
Payer: MEDICARE

## 2023-03-28 ENCOUNTER — ANESTHESIA (OUTPATIENT)
Dept: OPERATING ROOM | Age: 83
DRG: 480 | End: 2023-03-28
Payer: MEDICARE

## 2023-03-28 PROBLEM — Z01.818 PREOPERATIVE CLEARANCE: Status: ACTIVE | Noted: 2023-03-28

## 2023-03-28 PROBLEM — S72.002A CLOSED FRACTURE OF LEFT HIP (HCC): Status: ACTIVE | Noted: 2023-03-28

## 2023-03-28 PROBLEM — Z86.79 S/P AAA REPAIR: Status: ACTIVE | Noted: 2023-03-28

## 2023-03-28 PROBLEM — Z98.890 S/P AAA REPAIR: Status: ACTIVE | Noted: 2023-03-28

## 2023-03-28 LAB
BASOPHILS # BLD: 0 K/UL (ref 0–0.2)
BASOPHILS NFR BLD: 0.3 %
DEPRECATED RDW RBC AUTO: 12.9 % (ref 12.4–15.4)
EOSINOPHIL # BLD: 0.4 K/UL (ref 0–0.6)
EOSINOPHIL NFR BLD: 3.7 %
EST. AVERAGE GLUCOSE BLD GHB EST-MCNC: 134.1 MG/DL
GLUCOSE BLD-MCNC: 110 MG/DL (ref 70–99)
GLUCOSE BLD-MCNC: 112 MG/DL (ref 70–99)
GLUCOSE BLD-MCNC: 117 MG/DL (ref 70–99)
GLUCOSE BLD-MCNC: 137 MG/DL (ref 70–99)
HBA1C MFR BLD: 6.3 %
HCT VFR BLD AUTO: 37.2 % (ref 40.5–52.5)
HGB BLD-MCNC: 12.6 G/DL (ref 13.5–17.5)
LYMPHOCYTES # BLD: 2.8 K/UL (ref 1–5.1)
LYMPHOCYTES NFR BLD: 25.8 %
MCH RBC QN AUTO: 31.2 PG (ref 26–34)
MCHC RBC AUTO-ENTMCNC: 33.8 G/DL (ref 31–36)
MCV RBC AUTO: 92.3 FL (ref 80–100)
MONOCYTES # BLD: 0.8 K/UL (ref 0–1.3)
MONOCYTES NFR BLD: 7.7 %
NEUTROPHILS # BLD: 6.9 K/UL (ref 1.7–7.7)
NEUTROPHILS NFR BLD: 62.5 %
PERFORMED ON: ABNORMAL
PLATELET # BLD AUTO: 242 K/UL (ref 135–450)
PMV BLD AUTO: 6.8 FL (ref 5–10.5)
PROCALCITONIN SERPL IA-MCNC: 0.12 NG/ML (ref 0–0.15)
RBC # BLD AUTO: 4.02 M/UL (ref 4.2–5.9)
WBC # BLD AUTO: 11 K/UL (ref 4–11)

## 2023-03-28 PROCEDURE — 2709999900 HC NON-CHARGEABLE SUPPLY: Performed by: ORTHOPAEDIC SURGERY

## 2023-03-28 PROCEDURE — 6360000002 HC RX W HCPCS: Performed by: SPECIALIST/TECHNOLOGIST

## 2023-03-28 PROCEDURE — 2580000003 HC RX 258: Performed by: ORTHOPAEDIC SURGERY

## 2023-03-28 PROCEDURE — 36415 COLL VENOUS BLD VENIPUNCTURE: CPT

## 2023-03-28 PROCEDURE — 2700000000 HC OXYGEN THERAPY PER DAY

## 2023-03-28 PROCEDURE — 7100000000 HC PACU RECOVERY - FIRST 15 MIN: Performed by: ORTHOPAEDIC SURGERY

## 2023-03-28 PROCEDURE — C1713 ANCHOR/SCREW BN/BN,TIS/BN: HCPCS | Performed by: ORTHOPAEDIC SURGERY

## 2023-03-28 PROCEDURE — 2500000003 HC RX 250 WO HCPCS: Performed by: ANESTHESIOLOGY

## 2023-03-28 PROCEDURE — 2720000010 HC SURG SUPPLY STERILE: Performed by: ORTHOPAEDIC SURGERY

## 2023-03-28 PROCEDURE — 2580000003 HC RX 258: Performed by: NURSE ANESTHETIST, CERTIFIED REGISTERED

## 2023-03-28 PROCEDURE — 83036 HEMOGLOBIN GLYCOSYLATED A1C: CPT

## 2023-03-28 PROCEDURE — 2580000003 HC RX 258: Performed by: ANESTHESIOLOGY

## 2023-03-28 PROCEDURE — 6360000002 HC RX W HCPCS: Performed by: NURSE ANESTHETIST, CERTIFIED REGISTERED

## 2023-03-28 PROCEDURE — 83880 ASSAY OF NATRIURETIC PEPTIDE: CPT

## 2023-03-28 PROCEDURE — A4217 STERILE WATER/SALINE, 500 ML: HCPCS | Performed by: ORTHOPAEDIC SURGERY

## 2023-03-28 PROCEDURE — 94660 CPAP INITIATION&MGMT: CPT

## 2023-03-28 PROCEDURE — 3600000004 HC SURGERY LEVEL 4 BASE: Performed by: ORTHOPAEDIC SURGERY

## 2023-03-28 PROCEDURE — 99232 SBSQ HOSP IP/OBS MODERATE 35: CPT | Performed by: INTERNAL MEDICINE

## 2023-03-28 PROCEDURE — 99222 1ST HOSP IP/OBS MODERATE 55: CPT | Performed by: INTERNAL MEDICINE

## 2023-03-28 PROCEDURE — 7100000001 HC PACU RECOVERY - ADDTL 15 MIN: Performed by: ORTHOPAEDIC SURGERY

## 2023-03-28 PROCEDURE — 3600000014 HC SURGERY LEVEL 4 ADDTL 15MIN: Performed by: ORTHOPAEDIC SURGERY

## 2023-03-28 PROCEDURE — 73501 X-RAY EXAM HIP UNI 1 VIEW: CPT

## 2023-03-28 PROCEDURE — 6370000000 HC RX 637 (ALT 250 FOR IP): Performed by: SPECIALIST/TECHNOLOGIST

## 2023-03-28 PROCEDURE — 1200000000 HC SEMI PRIVATE

## 2023-03-28 PROCEDURE — 84439 ASSAY OF FREE THYROXINE: CPT

## 2023-03-28 PROCEDURE — 73502 X-RAY EXAM HIP UNI 2-3 VIEWS: CPT

## 2023-03-28 PROCEDURE — 3700000001 HC ADD 15 MINUTES (ANESTHESIA): Performed by: ORTHOPAEDIC SURGERY

## 2023-03-28 PROCEDURE — 2500000003 HC RX 250 WO HCPCS: Performed by: NURSE ANESTHETIST, CERTIFIED REGISTERED

## 2023-03-28 PROCEDURE — 84443 ASSAY THYROID STIM HORMONE: CPT

## 2023-03-28 PROCEDURE — 6370000000 HC RX 637 (ALT 250 FOR IP): Performed by: HOSPITALIST

## 2023-03-28 PROCEDURE — C1769 GUIDE WIRE: HCPCS | Performed by: ORTHOPAEDIC SURGERY

## 2023-03-28 PROCEDURE — 0QS704Z REPOSITION LEFT UPPER FEMUR WITH INTERNAL FIXATION DEVICE, OPEN APPROACH: ICD-10-PCS | Performed by: ORTHOPAEDIC SURGERY

## 2023-03-28 PROCEDURE — 84145 PROCALCITONIN (PCT): CPT

## 2023-03-28 PROCEDURE — 85025 COMPLETE CBC W/AUTO DIFF WBC: CPT

## 2023-03-28 PROCEDURE — 3700000000 HC ANESTHESIA ATTENDED CARE: Performed by: ORTHOPAEDIC SURGERY

## 2023-03-28 PROCEDURE — 3209999900 FLUORO FOR SURGICAL PROCEDURES

## 2023-03-28 PROCEDURE — 94761 N-INVAS EAR/PLS OXIMETRY MLT: CPT

## 2023-03-28 DEVICE — SCREW BNE L95MM DIA6.5MM THRD L16MM S STL CANN HEX SOCK: Type: IMPLANTABLE DEVICE | Site: HIP | Status: FUNCTIONAL

## 2023-03-28 DEVICE — SCREW BNE L90MM DIA6.5MM THRD L16MM CANC S STL SELF DRL ST: Type: IMPLANTABLE DEVICE | Site: HIP | Status: FUNCTIONAL

## 2023-03-28 RX ORDER — LIDOCAINE HYDROCHLORIDE 20 MG/ML
INJECTION, SOLUTION INFILTRATION; PERINEURAL PRN
Status: DISCONTINUED | OUTPATIENT
Start: 2023-03-28 | End: 2023-03-28 | Stop reason: SDUPTHER

## 2023-03-28 RX ORDER — OXYCODONE HYDROCHLORIDE 5 MG/1
10 TABLET ORAL EVERY 4 HOURS PRN
Status: DISCONTINUED | OUTPATIENT
Start: 2023-03-28 | End: 2023-03-30 | Stop reason: HOSPADM

## 2023-03-28 RX ORDER — MEPERIDINE HYDROCHLORIDE 50 MG/ML
12.5 INJECTION INTRAMUSCULAR; INTRAVENOUS; SUBCUTANEOUS AS NEEDED
Status: DISCONTINUED | OUTPATIENT
Start: 2023-03-28 | End: 2023-03-28 | Stop reason: HOSPADM

## 2023-03-28 RX ORDER — HYDRALAZINE HYDROCHLORIDE 20 MG/ML
10 INJECTION INTRAMUSCULAR; INTRAVENOUS
Status: DISCONTINUED | OUTPATIENT
Start: 2023-03-28 | End: 2023-03-28 | Stop reason: HOSPADM

## 2023-03-28 RX ORDER — ONDANSETRON 2 MG/ML
4 INJECTION INTRAMUSCULAR; INTRAVENOUS
Status: DISCONTINUED | OUTPATIENT
Start: 2023-03-28 | End: 2023-03-28 | Stop reason: HOSPADM

## 2023-03-28 RX ORDER — CLONAZEPAM 1 MG/1
1 TABLET ORAL NIGHTLY
Status: DISCONTINUED | OUTPATIENT
Start: 2023-03-28 | End: 2023-03-30 | Stop reason: HOSPADM

## 2023-03-28 RX ORDER — MAGNESIUM HYDROXIDE 1200 MG/15ML
LIQUID ORAL CONTINUOUS PRN
Status: COMPLETED | OUTPATIENT
Start: 2023-03-28 | End: 2023-03-28

## 2023-03-28 RX ORDER — POLYETHYLENE GLYCOL 3350 17 G/17G
17 POWDER, FOR SOLUTION ORAL DAILY
Status: DISCONTINUED | OUTPATIENT
Start: 2023-03-29 | End: 2023-03-30 | Stop reason: HOSPADM

## 2023-03-28 RX ORDER — SODIUM CHLORIDE, SODIUM LACTATE, POTASSIUM CHLORIDE, CALCIUM CHLORIDE 600; 310; 30; 20 MG/100ML; MG/100ML; MG/100ML; MG/100ML
INJECTION, SOLUTION INTRAVENOUS CONTINUOUS PRN
Status: DISCONTINUED | OUTPATIENT
Start: 2023-03-28 | End: 2023-03-28 | Stop reason: SDUPTHER

## 2023-03-28 RX ORDER — SODIUM CHLORIDE 0.9 % (FLUSH) 0.9 %
5-40 SYRINGE (ML) INJECTION EVERY 12 HOURS SCHEDULED
Status: DISCONTINUED | OUTPATIENT
Start: 2023-03-28 | End: 2023-03-30 | Stop reason: HOSPADM

## 2023-03-28 RX ORDER — SODIUM CHLORIDE 0.9 % (FLUSH) 0.9 %
5-40 SYRINGE (ML) INJECTION PRN
Status: DISCONTINUED | OUTPATIENT
Start: 2023-03-28 | End: 2023-03-28 | Stop reason: HOSPADM

## 2023-03-28 RX ORDER — MORPHINE SULFATE 2 MG/ML
2 INJECTION, SOLUTION INTRAMUSCULAR; INTRAVENOUS
Status: DISCONTINUED | OUTPATIENT
Start: 2023-03-28 | End: 2023-03-29

## 2023-03-28 RX ORDER — ONDANSETRON 2 MG/ML
INJECTION INTRAMUSCULAR; INTRAVENOUS PRN
Status: DISCONTINUED | OUTPATIENT
Start: 2023-03-28 | End: 2023-03-28 | Stop reason: SDUPTHER

## 2023-03-28 RX ORDER — MORPHINE SULFATE 4 MG/ML
4 INJECTION, SOLUTION INTRAMUSCULAR; INTRAVENOUS
Status: DISCONTINUED | OUTPATIENT
Start: 2023-03-28 | End: 2023-03-29

## 2023-03-28 RX ORDER — CEFAZOLIN SODIUM IN 0.9 % NACL 2 G/100 ML
2000 PLASTIC BAG, INJECTION (ML) INTRAVENOUS
Status: COMPLETED | OUTPATIENT
Start: 2023-03-28 | End: 2023-03-28

## 2023-03-28 RX ORDER — SODIUM CHLORIDE 0.9 % (FLUSH) 0.9 %
5-40 SYRINGE (ML) INJECTION EVERY 12 HOURS SCHEDULED
Status: DISCONTINUED | OUTPATIENT
Start: 2023-03-28 | End: 2023-03-28 | Stop reason: HOSPADM

## 2023-03-28 RX ORDER — PHENYLEPHRINE HCL IN 0.9% NACL 1 MG/10 ML
SYRINGE (ML) INTRAVENOUS PRN
Status: DISCONTINUED | OUTPATIENT
Start: 2023-03-28 | End: 2023-03-28 | Stop reason: SDUPTHER

## 2023-03-28 RX ORDER — DIPHENHYDRAMINE HYDROCHLORIDE 50 MG/ML
12.5 INJECTION INTRAMUSCULAR; INTRAVENOUS
Status: DISCONTINUED | OUTPATIENT
Start: 2023-03-28 | End: 2023-03-28 | Stop reason: HOSPADM

## 2023-03-28 RX ORDER — SODIUM CHLORIDE 0.9 % (FLUSH) 0.9 %
5-40 SYRINGE (ML) INJECTION PRN
Status: DISCONTINUED | OUTPATIENT
Start: 2023-03-28 | End: 2023-03-30 | Stop reason: HOSPADM

## 2023-03-28 RX ORDER — DEXAMETHASONE SODIUM PHOSPHATE 10 MG/ML
INJECTION INTRAMUSCULAR; INTRAVENOUS PRN
Status: DISCONTINUED | OUTPATIENT
Start: 2023-03-28 | End: 2023-03-28 | Stop reason: SDUPTHER

## 2023-03-28 RX ORDER — ROCURONIUM BROMIDE 10 MG/ML
INJECTION, SOLUTION INTRAVENOUS PRN
Status: DISCONTINUED | OUTPATIENT
Start: 2023-03-28 | End: 2023-03-28 | Stop reason: SDUPTHER

## 2023-03-28 RX ORDER — SODIUM CHLORIDE 9 MG/ML
INJECTION, SOLUTION INTRAVENOUS CONTINUOUS PRN
Status: DISCONTINUED | OUTPATIENT
Start: 2023-03-28 | End: 2023-03-28 | Stop reason: SDUPTHER

## 2023-03-28 RX ORDER — CEFAZOLIN SODIUM IN 0.9 % NACL 2 G/100 ML
2000 PLASTIC BAG, INJECTION (ML) INTRAVENOUS EVERY 8 HOURS
Status: COMPLETED | OUTPATIENT
Start: 2023-03-29 | End: 2023-03-29

## 2023-03-28 RX ORDER — OXYCODONE HYDROCHLORIDE 5 MG/1
5 TABLET ORAL EVERY 4 HOURS PRN
Status: DISCONTINUED | OUTPATIENT
Start: 2023-03-28 | End: 2023-03-30 | Stop reason: HOSPADM

## 2023-03-28 RX ORDER — PROCHLORPERAZINE EDISYLATE 5 MG/ML
5 INJECTION INTRAMUSCULAR; INTRAVENOUS
Status: DISCONTINUED | OUTPATIENT
Start: 2023-03-28 | End: 2023-03-28 | Stop reason: HOSPADM

## 2023-03-28 RX ORDER — PROPOFOL 10 MG/ML
INJECTION, EMULSION INTRAVENOUS PRN
Status: DISCONTINUED | OUTPATIENT
Start: 2023-03-28 | End: 2023-03-28 | Stop reason: SDUPTHER

## 2023-03-28 RX ORDER — SODIUM CHLORIDE 9 MG/ML
INJECTION, SOLUTION INTRAVENOUS PRN
Status: DISCONTINUED | OUTPATIENT
Start: 2023-03-28 | End: 2023-03-28 | Stop reason: HOSPADM

## 2023-03-28 RX ADMIN — FUROSEMIDE 40 MG: 40 TABLET ORAL at 07:58

## 2023-03-28 RX ADMIN — PIRFENIDONE 1 TABLET: 801 TABLET, FILM COATED ORAL at 09:09

## 2023-03-28 RX ADMIN — Medication 100 MCG: at 20:05

## 2023-03-28 RX ADMIN — PIRFENIDONE 1 TABLET: 801 TABLET, FILM COATED ORAL at 11:29

## 2023-03-28 RX ADMIN — Medication 1 TABLET: at 07:58

## 2023-03-28 RX ADMIN — Medication 2000 MG: at 19:28

## 2023-03-28 RX ADMIN — HYDROMORPHONE HYDROCHLORIDE 0.5 MG: 2 TABLET ORAL at 07:58

## 2023-03-28 RX ADMIN — Medication 100 MCG: at 19:46

## 2023-03-28 RX ADMIN — SUGAMMADEX 200 MG: 100 INJECTION, SOLUTION INTRAVENOUS at 21:28

## 2023-03-28 RX ADMIN — Medication 100 MCG: at 19:28

## 2023-03-28 RX ADMIN — ROCURONIUM BROMIDE 50 MG: 10 SOLUTION INTRAVENOUS at 19:24

## 2023-03-28 RX ADMIN — Medication 100 MCG: at 20:12

## 2023-03-28 RX ADMIN — MORPHINE SULFATE 2 MG: 2 INJECTION, SOLUTION INTRAMUSCULAR; INTRAVENOUS at 14:32

## 2023-03-28 RX ADMIN — Medication 100 MCG: at 19:26

## 2023-03-28 RX ADMIN — Medication 100 MCG: at 20:15

## 2023-03-28 RX ADMIN — Medication 100 MCG: at 20:07

## 2023-03-28 RX ADMIN — SODIUM CHLORIDE, SODIUM LACTATE, POTASSIUM CHLORIDE, AND CALCIUM CHLORIDE: .6; .31; .03; .02 INJECTION, SOLUTION INTRAVENOUS at 20:19

## 2023-03-28 RX ADMIN — DEXAMETHASONE SODIUM PHOSPHATE 10 MG: 10 INJECTION INTRAMUSCULAR; INTRAVENOUS at 19:28

## 2023-03-28 RX ADMIN — ROCURONIUM BROMIDE 15 MG: 10 SOLUTION INTRAVENOUS at 20:00

## 2023-03-28 RX ADMIN — OXYCODONE HYDROCHLORIDE 10 MG: 5 TABLET ORAL at 11:29

## 2023-03-28 RX ADMIN — OXYCODONE HYDROCHLORIDE 10 MG: 5 TABLET ORAL at 17:16

## 2023-03-28 RX ADMIN — Medication 100 MCG: at 19:49

## 2023-03-28 RX ADMIN — ONDANSETRON 4 MG: 2 INJECTION INTRAMUSCULAR; INTRAVENOUS at 19:28

## 2023-03-28 RX ADMIN — GABAPENTIN 300 MG: 300 CAPSULE ORAL at 17:16

## 2023-03-28 RX ADMIN — Medication 2000 MG: at 21:32

## 2023-03-28 RX ADMIN — Medication 100 MCG: at 19:42

## 2023-03-28 RX ADMIN — ISOSORBIDE MONONITRATE 30 MG: 30 TABLET, EXTENDED RELEASE ORAL at 07:58

## 2023-03-28 RX ADMIN — Medication 100 MCG: at 20:03

## 2023-03-28 RX ADMIN — Medication 100 MCG: at 20:09

## 2023-03-28 RX ADMIN — PROPOFOL 100 MG: 10 INJECTION, EMULSION INTRAVENOUS at 19:24

## 2023-03-28 RX ADMIN — Medication 100 MCG: at 19:35

## 2023-03-28 RX ADMIN — SODIUM CHLORIDE, SODIUM LACTATE, POTASSIUM CHLORIDE, AND CALCIUM CHLORIDE: .6; .31; .03; .02 INJECTION, SOLUTION INTRAVENOUS at 19:18

## 2023-03-28 RX ADMIN — SODIUM CHLORIDE: 9 INJECTION, SOLUTION INTRAVENOUS at 19:18

## 2023-03-28 RX ADMIN — PIRFENIDONE 1 TABLET: 801 TABLET, FILM COATED ORAL at 17:16

## 2023-03-28 RX ADMIN — Medication 100 MCG: at 19:59

## 2023-03-28 RX ADMIN — Medication 100 MCG: at 19:24

## 2023-03-28 RX ADMIN — Medication 100 MCG: at 19:56

## 2023-03-28 RX ADMIN — ROCURONIUM BROMIDE 10 MG: 10 SOLUTION INTRAVENOUS at 20:40

## 2023-03-28 RX ADMIN — LIDOCAINE HYDROCHLORIDE 60 MG: 20 INJECTION, SOLUTION INFILTRATION; PERINEURAL at 19:24

## 2023-03-28 RX ADMIN — Medication 100 MCG: at 19:52

## 2023-03-28 RX ADMIN — AMLODIPINE BESYLATE 5 MG: 5 TABLET ORAL at 07:59

## 2023-03-28 ASSESSMENT — PAIN DESCRIPTION - LOCATION
LOCATION: HIP;LEG
LOCATION: BACK;LEG;KNEE;HIP
LOCATION: LEG;HIP
LOCATION: HIP;LEG

## 2023-03-28 ASSESSMENT — PAIN - FUNCTIONAL ASSESSMENT
PAIN_FUNCTIONAL_ASSESSMENT: PREVENTS OR INTERFERES SOME ACTIVE ACTIVITIES AND ADLS

## 2023-03-28 ASSESSMENT — PAIN DESCRIPTION - ORIENTATION
ORIENTATION: LEFT

## 2023-03-28 ASSESSMENT — PAIN DESCRIPTION - DESCRIPTORS
DESCRIPTORS: ACHING;SHOOTING
DESCRIPTORS: ACHING
DESCRIPTORS: ACHING;BURNING
DESCRIPTORS: ACHING;SHOOTING

## 2023-03-28 ASSESSMENT — COPD QUESTIONNAIRES: CAT_SEVERITY: SEVERE

## 2023-03-28 ASSESSMENT — PAIN SCALES - GENERAL
PAINLEVEL_OUTOF10: 7
PAINLEVEL_OUTOF10: 6
PAINLEVEL_OUTOF10: 10
PAINLEVEL_OUTOF10: 10

## 2023-03-28 ASSESSMENT — LIFESTYLE VARIABLES: SMOKING_STATUS: 1

## 2023-03-28 ASSESSMENT — ENCOUNTER SYMPTOMS: DYSPNEA ACTIVITY LEVEL: AFTER AMBULATING 1 FLIGHT OF STAIRS

## 2023-03-28 NOTE — CONSULTS
Consult [facility]/called to Cardiology on 03/28/23 at 7:41 AM Kendell Prabhakar
and Cervical discectomy. Social History:   reports that he quit smoking about 20 years ago. His smoking use included cigarettes. He has a 220.00 pack-year smoking history. He has quit using smokeless tobacco.  His smokeless tobacco use included chew. He reports current alcohol use. He reports that he does not use drugs. Family History:  family history is negative for significant heart disease in parents    Home Medications:  Were reviewed and are listed in nursing record. and/or listed below  Prior to Admission medications    Medication Sig Start Date End Date Taking? Authorizing Provider   gabapentin (NEURONTIN) 300 MG capsule Take 300 mg by mouth every evening.    Yes Historical Provider, MD   celecoxib (CELEBREX) 200 MG capsule Take 200 mg by mouth 2 times daily   Yes Historical Provider, MD   Insulin Glargine (TOUJEO SOLOSTAR SC) Inject 30 Units into the skin in the morning and at bedtime   Yes Historical Provider, MD   Pirfenidone 801 MG TABS Take 1 tablet by mouth 3 times daily (with meals) 12/2/22   Farida Pyle MD   pioglitazone (ACTOS) 45 MG tablet  9/30/22   Historical Provider, MD   nortriptyline (PAMELOR) 50 MG capsule  10/26/22   Historical Provider, MD   zolpidem (AMBIEN) 10 MG tablet  9/17/22   Historical Provider, MD   QUEtiapine (SEROQUEL) 25 MG tablet  9/28/22   Historical Provider, MD   omeprazole (PRILOSEC) 40 MG delayed release capsule  9/30/22   Historical Provider, MD   atorvastatin (LIPITOR) 80 MG tablet 40 mg 11/25/22   Historical Provider, MD   ELIQUIS 2.5 MG TABS tablet  11/27/22   Historical Provider, MD   ESBRIET 801 MG TABS TAKE 1 TABLET THREE TIMES A DAY WITH FOOD 7/18/22   Farida Pyle MD   amLODIPine (NORVASC) 5 MG tablet TAKE 1 TABLET BY MOUTH EVERY DAY 12/17/15   Paresh Huitron MD   isosorbide mononitrate (IMDUR) 30 MG CR tablet TAKE 1 TABLET BY MOUTH EVERY DAY 12/15/15   Paresh Huitron MD   Multiple Vitamins-Minerals (THERAPEUTIC MULTIVITAMIN-MINERALS) tablet
by mouth daily. Historical Provider, MD   ezetimibe (ZETIA) 10 MG tablet Take 10 mg by mouth every evening. Patient not taking: Reported on 3/27/2023    Historical Provider, MD   aspirin 81 MG tablet Take 81 mg by mouth daily. Historical Provider, MD       Allergies:  Azithromycin    Social History:    Tobacco:  reports that he quit smoking about 20 years ago. His smoking use included cigarettes. He has a 220.00 pack-year smoking history. He has quit using smokeless tobacco.  His smokeless tobacco use included chew. Alcohol:  reports current alcohol use. Illicit Drug: No  Family History:   History reviewed. No pertinent family history. REVIEW OF SYSTEMS:    CONSTITUTIONAL:  negative  MUSCULOSKELETAL:  positive for  pain  All other systems reviewed and negative    PHYSICAL EXAM:    awake, alert, cooperative, no apparent distress, and appears stated age  MUSCULOSKELETAL:  there is no redness, warmth, or swelling of the joints  full range of motion noted  tone is normal  with exception of  LEFT HIP:  no obvious bony deformity of the left hip  Leg lengths equal bilaterally, rotational alignment at neutral  Tender to palpation along the lateral hip and anterior thigh  Log roll and SLR deferred secondary to patient condition  EHL FHL gastroc ant tib motor intact  Sensation intact to light touch   DP pulse not palpable, pt reports this is baseline secondary to hx of PVD. PT pulse 2+, foot WWP    DATA:    CBC:   Recent Labs     03/27/23  1334   WBC 10.1   HGB 12.2*        BMP:    Recent Labs     03/27/23  1334 03/27/23  1657    139   K 5.3* 4.1   CL 99 99   CO2 30 33*   BUN 22* 22*   CREATININE 1.0 1.0   GLUCOSE 149* 122*     INR:   Recent Labs     03/27/23  1334 03/27/23  1657   INR >16.29* 1.07       Radiology:   CT C-Spine W/O Contrast   Final Result   No acute intracranial abnormality. Stable lateral alignment with no evidence of acute traumatic injury.    Anterior fusion betweenand C7 is
for exam:->History of fall Has a \"code stroke\" or \"stroke alert\" been called? ->No Decision Support Exception - unselect if not a suspected or confirmed emergency medical condition->Emergency Medical Condition (MA) Reason for Exam: fell; ORDERING SYSTEM PROVIDED HISTORY: History of fall TECHNOLOGIST PROVIDED HISTORY: Reason for exam:->History of fall Decision Support Exception - unselect if not a suspected or confirmed emergency medical condition->Emergency Medical Condition (MA) Reason for Exam: fell FINDINGS: CT head: BRAIN/VENTRICLES: There is no acute intracranial hemorrhage, mass effect or midline shift. No abnormal extra-axial fluid collection. The gray-white differentiation is maintained without evidence of an acute infarct. There is no evidence of hydrocephalus. ORBITS: The visualized portion of the orbits demonstrate no acute abnormality. SINUSES: The visualized paranasal sinuses demonstrate no acute abnormality. Incidental left mastoid effusion. SOFT TISSUES/SKULL:  No acute abnormality of the visualized skull or soft tissues. CT cervical spine: Anterior fusion between C5 and C7. This is well aligned. Above the fusion there is minimal anterolisthesis of C3 over C4 which is likely degenerative. C1-C2 and odontoid appear unremarkable. Spinous processes and posterior elements appear intact. Mild multilevel degenerative disc disease. No acute intracranial abnormality. Stable lateral alignment with no evidence of acute traumatic injury. Anterior fusion betweenand C7 is stable. Minimal anterolisthesis of C3 over C4 is likely degenerative. No acute soft tissue abnormality.      CT C-Spine W/O Contrast    Result Date: 3/27/2023  EXAMINATION: CT OF THE HEAD WITHOUT CONTRAST; CT OF THE CERVICAL SPINE WITHOUT CONTRAST 3/27/2023 3:32 pm TECHNIQUE: CT of the head was performed without the administration of intravenous contrast. Automated exposure control, iterative reconstruction, and/or weight based

## 2023-03-28 NOTE — ANESTHESIA PRE PROCEDURE
)                           Cardiovascular:  Exercise tolerance: poor (<4 METS),   (+) hypertension: moderate, FREDERICK: after ambulating 1 flight of stairs,     (-) past MI    NYHA Classification: III  ECG reviewed  Rhythm: regular  Rate: normal  Echocardiogram reviewed    Cleared by cardiology     Beta Blocker:  Not on Beta Blocker         Neuro/Psych:               GI/Hepatic/Renal:             Endo/Other:    (+) Diabetes (bs 110)Type II DM, well controlled, using insulin, . Abdominal:             Vascular:   + PVD, aortic or cerebral (AAA repaired yrs ago ), PE.        ROS comment: On eliquis   . Other Findings:           Anesthesia Plan      general     ASA 4 - emergent       Induction: intravenous. MIPS: Prophylactic antiemetics administered. Anesthetic plan and risks discussed with patient. Plan discussed with CRNA.     Attending anesthesiologist reviewed and agrees with Preprocedure content                Eleanor Mendez DO   3/28/2023

## 2023-03-28 NOTE — H&P
Medicine      Disclaimer: This note was dictated with voice recognition software. Though review and correction are routinely performed, please contact the office/medical records for any errors requiring correction.
not taking: Reported on 3/27/2023)      nortriptyline (PAMELOR) 50 MG capsule       zolpidem (AMBIEN) 10 MG tablet       QUEtiapine (SEROQUEL) 25 MG tablet  (Patient not taking: Reported on 3/27/2023)      omeprazole (PRILOSEC) 40 MG delayed release capsule       atorvastatin (LIPITOR) 80 MG tablet 40 mg      ELIQUIS 2.5 MG TABS tablet       ESBRIET 801 MG TABS TAKE 1 TABLET THREE TIMES A DAY WITH FOOD 90 tablet 11    amLODIPine (NORVASC) 5 MG tablet TAKE 1 TABLET BY MOUTH EVERY DAY 90 tablet 0    isosorbide mononitrate (IMDUR) 30 MG CR tablet TAKE 1 TABLET BY MOUTH EVERY DAY 30 tablet 5    Multiple Vitamins-Minerals (THERAPEUTIC MULTIVITAMIN-MINERALS) tablet Take 1 tablet by mouth daily. furosemide (LASIX) 40 MG tablet Take 40 mg by mouth every morning. rosuvastatin (CRESTOR) 20 MG tablet Take 20 mg by mouth every evening. (Patient not taking: Reported on 3/27/2023)      diclofenac (VOLTAREN) 75 MG EC tablet Take 75 mg by mouth 2 times daily. (Patient not taking: Reported on 3/27/2023)      clonazepam (KLONOPIN) 1 MG disintegrating tablet Take 1 mg by mouth nightly as needed. (Patient not taking: Reported on 3/27/2023)      SITagliptin (JANUVIA) 100 MG tablet Take 100 mg by mouth daily. ezetimibe (ZETIA) 10 MG tablet Take 10 mg by mouth every evening. (Patient not taking: Reported on 3/27/2023)      aspirin 81 MG tablet Take 81 mg by mouth daily. Allergies: Allergies   Allergen Reactions    Azithromycin Itching        Social History:   reports that he quit smoking about 20 years ago. His smoking use included cigarettes. He has a 220.00 pack-year smoking history. He has quit using smokeless tobacco.  His smokeless tobacco use included chew. He reports current alcohol use. He reports that he does not use drugs. Family History:  family history is not on file.      Physical Exam:  /80   Pulse 92   Temp 98.1 °F (36.7 °C) (Oral)   Resp 22   Ht 5' 10\" (1.778 m)   Wt 250 lb (113.4 kg)

## 2023-03-29 LAB
ANION GAP SERPL CALCULATED.3IONS-SCNC: 7 MMOL/L (ref 3–16)
BUN SERPL-MCNC: 29 MG/DL (ref 7–20)
CALCIUM SERPL-MCNC: 8.8 MG/DL (ref 8.3–10.6)
CHLORIDE SERPL-SCNC: 95 MMOL/L (ref 99–110)
CO2 SERPL-SCNC: 34 MMOL/L (ref 21–32)
CREAT SERPL-MCNC: 1.3 MG/DL (ref 0.8–1.3)
DEPRECATED RDW RBC AUTO: 12.9 % (ref 12.4–15.4)
EST. AVERAGE GLUCOSE BLD GHB EST-MCNC: 134.1 MG/DL
GFR SERPLBLD CREATININE-BSD FMLA CKD-EPI: 55 ML/MIN/{1.73_M2}
GLUCOSE BLD-MCNC: 144 MG/DL (ref 70–99)
GLUCOSE BLD-MCNC: 197 MG/DL (ref 70–99)
GLUCOSE BLD-MCNC: 198 MG/DL (ref 70–99)
GLUCOSE BLD-MCNC: 252 MG/DL (ref 70–99)
GLUCOSE SERPL-MCNC: 166 MG/DL (ref 70–99)
HBA1C MFR BLD: 6.3 %
HCT VFR BLD AUTO: 33.3 % (ref 40.5–52.5)
HGB BLD-MCNC: 11.3 G/DL (ref 13.5–17.5)
MCH RBC QN AUTO: 31.4 PG (ref 26–34)
MCHC RBC AUTO-ENTMCNC: 33.9 G/DL (ref 31–36)
MCV RBC AUTO: 92.6 FL (ref 80–100)
PERFORMED ON: ABNORMAL
PLATELET # BLD AUTO: 204 K/UL (ref 135–450)
PMV BLD AUTO: 6.7 FL (ref 5–10.5)
POTASSIUM SERPL-SCNC: 5.3 MMOL/L (ref 3.5–5.1)
RBC # BLD AUTO: 3.59 M/UL (ref 4.2–5.9)
SODIUM SERPL-SCNC: 136 MMOL/L (ref 136–145)
WBC # BLD AUTO: 8.6 K/UL (ref 4–11)

## 2023-03-29 PROCEDURE — 85027 COMPLETE CBC AUTOMATED: CPT

## 2023-03-29 PROCEDURE — 1200000000 HC SEMI PRIVATE

## 2023-03-29 PROCEDURE — 2700000000 HC OXYGEN THERAPY PER DAY

## 2023-03-29 PROCEDURE — 94761 N-INVAS EAR/PLS OXIMETRY MLT: CPT

## 2023-03-29 PROCEDURE — 6370000000 HC RX 637 (ALT 250 FOR IP): Performed by: HOSPITALIST

## 2023-03-29 PROCEDURE — 99232 SBSQ HOSP IP/OBS MODERATE 35: CPT | Performed by: INTERNAL MEDICINE

## 2023-03-29 PROCEDURE — 84439 ASSAY OF FREE THYROXINE: CPT

## 2023-03-29 PROCEDURE — 97530 THERAPEUTIC ACTIVITIES: CPT

## 2023-03-29 PROCEDURE — 6370000000 HC RX 637 (ALT 250 FOR IP): Performed by: ORTHOPAEDIC SURGERY

## 2023-03-29 PROCEDURE — 2580000003 HC RX 258: Performed by: ORTHOPAEDIC SURGERY

## 2023-03-29 PROCEDURE — 97110 THERAPEUTIC EXERCISES: CPT

## 2023-03-29 PROCEDURE — 80048 BASIC METABOLIC PNL TOTAL CA: CPT

## 2023-03-29 PROCEDURE — 6370000000 HC RX 637 (ALT 250 FOR IP): Performed by: SPECIALIST/TECHNOLOGIST

## 2023-03-29 PROCEDURE — 6360000002 HC RX W HCPCS: Performed by: ORTHOPAEDIC SURGERY

## 2023-03-29 PROCEDURE — 36415 COLL VENOUS BLD VENIPUNCTURE: CPT

## 2023-03-29 PROCEDURE — 97162 PT EVAL MOD COMPLEX 30 MIN: CPT

## 2023-03-29 PROCEDURE — 97166 OT EVAL MOD COMPLEX 45 MIN: CPT

## 2023-03-29 PROCEDURE — 84443 ASSAY THYROID STIM HORMONE: CPT

## 2023-03-29 RX ORDER — INSULIN LISPRO 100 [IU]/ML
0-4 INJECTION, SOLUTION INTRAVENOUS; SUBCUTANEOUS
Status: DISCONTINUED | OUTPATIENT
Start: 2023-03-30 | End: 2023-03-30 | Stop reason: HOSPADM

## 2023-03-29 RX ORDER — ACETAMINOPHEN 325 MG/1
650 TABLET ORAL EVERY 6 HOURS
Status: DISCONTINUED | OUTPATIENT
Start: 2023-03-29 | End: 2023-03-30 | Stop reason: HOSPADM

## 2023-03-29 RX ORDER — INSULIN LISPRO 100 [IU]/ML
0-4 INJECTION, SOLUTION INTRAVENOUS; SUBCUTANEOUS NIGHTLY
Status: DISCONTINUED | OUTPATIENT
Start: 2023-03-29 | End: 2023-03-30 | Stop reason: HOSPADM

## 2023-03-29 RX ORDER — ACETAMINOPHEN 325 MG/1
650 TABLET ORAL EVERY 6 HOURS
Qty: 120 TABLET | Refills: 3 | DISCHARGE
Start: 2023-03-29

## 2023-03-29 RX ORDER — LIDOCAINE 4 G/G
1 PATCH TOPICAL DAILY
DISCHARGE
Start: 2023-03-30

## 2023-03-29 RX ORDER — POLYETHYLENE GLYCOL 3350 17 G/17G
17 POWDER, FOR SOLUTION ORAL DAILY
Qty: 527 G | Refills: 1 | DISCHARGE
Start: 2023-03-30 | End: 2023-04-29

## 2023-03-29 RX ADMIN — INSULIN GLARGINE 24 UNITS: 100 INJECTION, SOLUTION SUBCUTANEOUS at 22:03

## 2023-03-29 RX ADMIN — INSULIN LISPRO 4 UNITS: 100 INJECTION, SOLUTION INTRAVENOUS; SUBCUTANEOUS at 11:49

## 2023-03-29 RX ADMIN — Medication 2000 MG: at 06:11

## 2023-03-29 RX ADMIN — APIXABAN 2.5 MG: 2.5 TABLET, FILM COATED ORAL at 22:00

## 2023-03-29 RX ADMIN — POLYETHYLENE GLYCOL 3350 17 G: 17 POWDER, FOR SOLUTION ORAL at 08:24

## 2023-03-29 RX ADMIN — SODIUM CHLORIDE: 9 INJECTION, SOLUTION INTRAVENOUS at 06:10

## 2023-03-29 RX ADMIN — ACETAMINOPHEN 650 MG: 325 TABLET ORAL at 22:00

## 2023-03-29 RX ADMIN — ISOSORBIDE MONONITRATE 30 MG: 30 TABLET, EXTENDED RELEASE ORAL at 08:24

## 2023-03-29 RX ADMIN — NORTRIPTYLINE HYDROCHLORIDE 50 MG: 25 CAPSULE ORAL at 21:59

## 2023-03-29 RX ADMIN — OXYCODONE HYDROCHLORIDE 10 MG: 5 TABLET ORAL at 13:33

## 2023-03-29 RX ADMIN — APIXABAN 2.5 MG: 2.5 TABLET, FILM COATED ORAL at 14:00

## 2023-03-29 RX ADMIN — CLONAZEPAM 1 MG: 1 TABLET ORAL at 22:00

## 2023-03-29 RX ADMIN — Medication 1 TABLET: at 08:23

## 2023-03-29 RX ADMIN — PIRFENIDONE 1 TABLET: 801 TABLET, FILM COATED ORAL at 08:25

## 2023-03-29 RX ADMIN — PIRFENIDONE 1 TABLET: 801 TABLET, FILM COATED ORAL at 11:30

## 2023-03-29 RX ADMIN — ATORVASTATIN CALCIUM 40 MG: 40 TABLET, FILM COATED ORAL at 22:00

## 2023-03-29 RX ADMIN — PIRFENIDONE 1 TABLET: 801 TABLET, FILM COATED ORAL at 17:40

## 2023-03-29 RX ADMIN — Medication 2000 MG: at 14:00

## 2023-03-29 RX ADMIN — ASPIRIN 81 MG: 81 TABLET, COATED ORAL at 08:24

## 2023-03-29 RX ADMIN — INSULIN GLARGINE 24 UNITS: 100 INJECTION, SOLUTION SUBCUTANEOUS at 08:26

## 2023-03-29 RX ADMIN — PANTOPRAZOLE SODIUM 40 MG: 40 TABLET, DELAYED RELEASE ORAL at 08:32

## 2023-03-29 RX ADMIN — AMLODIPINE BESYLATE 5 MG: 5 TABLET ORAL at 08:24

## 2023-03-29 RX ADMIN — ACETAMINOPHEN 650 MG: 325 TABLET ORAL at 11:29

## 2023-03-29 RX ADMIN — SODIUM CHLORIDE, PRESERVATIVE FREE 10 ML: 5 INJECTION INTRAVENOUS at 08:30

## 2023-03-29 RX ADMIN — GABAPENTIN 300 MG: 300 CAPSULE ORAL at 22:00

## 2023-03-29 RX ADMIN — FUROSEMIDE 40 MG: 40 TABLET ORAL at 08:24

## 2023-03-29 ASSESSMENT — PAIN SCALES - GENERAL: PAINLEVEL_OUTOF10: 10

## 2023-03-29 NOTE — DISCHARGE INSTR - COC
Level: 10  Last Weight:   Wt Readings from Last 1 Encounters:   03/27/23 250 lb (113.4 kg)     Mental Status:  oriented and alert    IV Access:  - None    Nursing Mobility/ADLs:  Walking   Assisted  Transfer  Assisted  Bathing  Assisted  Dressing  Assisted  Toileting  Assisted  Feeding  Independent  Med Admin  Independent  Med Delivery   whole    Wound Care Documentation and Therapy:  Incision 03/28/23 Hip Left;Lateral (Active)   Dressing Status Clean;Dry; Intact 03/29/23 0815   Dressing/Treatment Adhesive bandage 03/29/23 0815   Number of days: 1        Elimination:  Continence: Bowel: Yes  Bladder: Yes  Urinary Catheter: None   Colostomy/Ileostomy/Ileal Conduit: No  /  Date of Last BM: 3/28/2023    I/O last 3 completed shifts: In: 2400 [I.V.:2400]  Out: [address] [Urine:1650]    Safety Concerns:     None    Impairments/Disabilities:      None    Nutrition Therapy:  Current Nutrition Therapy:   - Oral Diet:  General    Routes of Feeding: Oral  Liquids: Thin Liquids  Daily Fluid Restriction: no  Last Modified Barium Swallow with Video (Video Swallowing Test): not done    Treatments at the Time of Hospital Discharge:   Respiratory Treatments: N/A  Oxygen Therapy:  is on oxygen at 2 L/min per nasal cannula.   Ventilator:    - No ventilator support  - BiPAP   IPAP: 14 cmH20, CPAP/EPAP: 10 cmH2O only when sleeping    Rehab Therapies: Physical Therapy and Occupational Therapy  Weight Bearing Status/Restrictions: Touchdown weight bearing (10-25 lbs) only on left leg  Other Medical Equipment (for information only, NOT a DME order):  walker  Other Treatments: N/A    Patient's personal belongings (please select all that are sent with patient):  None    RN SIGNATURE:  Electronically signed by Alva Gao RN on 3/30/23 at 4:39 PM EDT    CASE MANAGEMENT/SOCIAL WORK SECTION    Inpatient Status Date: 3/27/23    Readmission Risk Assessment Score:  Readmission Risk              Risk of Unplanned Readmission:  17           Discharging

## 2023-03-29 NOTE — PLAN OF CARE
Problem: Discharge Planning  Goal: Discharge to home or other facility with appropriate resources  3/29/2023 1040 by Iris Morrison RN  Outcome: Progressing  3/29/2023 0129 by Caden Hu RN  Outcome: Progressing  3/29/2023 0128 by Caden Hu RN  Outcome: Progressing     Problem: Pain  Goal: Verbalizes/displays adequate comfort level or baseline comfort level  3/29/2023 1040 by Iris Morrison RN  Outcome: Progressing  3/29/2023 0129 by Caden Hu RN  Outcome: Progressing  3/29/2023 0128 by Caden Hu RN  Outcome: Progressing     Problem: Safety - Adult  Goal: Free from fall injury  3/29/2023 1040 by Iris Morrison RN  Outcome: Progressing  3/29/2023 0129 by Caden Hu RN  Outcome: Progressing  3/29/2023 0128 by Caden Hu RN  Outcome: Progressing     Problem: ABCDS Injury Assessment  Goal: Absence of physical injury  3/29/2023 1040 by Iris Morrison RN  Outcome: Progressing  3/29/2023 0129 by Caden Hu RN  Outcome: Progressing  3/29/2023 0128 by Caden Hu RN  Outcome: Progressing     Problem: Chronic Conditions and Co-morbidities  Goal: Patient's chronic conditions and co-morbidity symptoms are monitored and maintained or improved  3/29/2023 1040 by Iris Morrison RN  Outcome: Progressing  3/29/2023 0129 by Caden Hu RN  Outcome: Progressing  3/29/2023 0128 by Caden Hu RN  Outcome: Progressing     Problem: Skin/Tissue Integrity  Goal: Absence of new skin breakdown  Description: 1. Monitor for areas of redness and/or skin breakdown  2. Assess vascular access sites hourly  3. Every 4-6 hours minimum:  Change oxygen saturation probe site  4. Every 4-6 hours:  If on nasal continuous positive airway pressure, respiratory therapy assess nares and determine need for appliance change or resting period.   3/29/2023 1040 by Iris Morrison RN  Outcome: Progressing  3/29/2023 0129 by Caden Hu RN  Outcome: Progressing  3/29/2023 0128 by Caden Hu RN  Outcome: Progressing

## 2023-03-29 NOTE — ANESTHESIA POSTPROCEDURE EVALUATION
Department of Anesthesiology  Postprocedure Note    Patient: Milan Rizo  MRN: [id number]  YOB: 1940  Date of evaluation: 3/28/2023      Procedure Summary     Date: 03/28/23 Room / Location: [address] [place] [address]    Anesthesia Start: Curtis Loera Anesthesia Stop: 2139    Procedure: LEFT HIP PINNING (Left: Hip) Diagnosis:       Closed left hip fracture, initial encounter [facility])      (LEFT HIP FRACTURE)    Surgeons: Kinga Stephens MD Responsible Provider: Katalina Bennett DO    Anesthesia Type: general ASA Status: 4 - Emergent          Anesthesia Type: No value filed.     Hipolito Phase I: Hipolito Score: 9    Hipolito Phase II:        Anesthesia Post Evaluation    Patient location during evaluation: PACU  Patient participation: complete - patient participated  Level of consciousness: awake and alert  Pain score: 0  Airway patency: patent  Nausea & Vomiting: no nausea and no vomiting  Complications: no  Cardiovascular status: hemodynamically stable  Respiratory status: acceptable  Hydration status: stable

## 2023-03-29 NOTE — OP NOTE
Operative Note      Patient: Gorge Castanon  YOB: 1940  MRN: [id number]    Date of Procedure: 3/28/2023    Pre-Op Diagnosis: LEFT HIP FRACTURE    Post-Op Diagnosis: Same       Procedure(s):  LEFT HIP PINNING    Surgeon(s):  Maykel Larsen MD    Assistant:   Surgical Assistant: Carlos Quiros    Anesthesia: General    Estimated Blood Loss (mL): less than 50     Complications: None    Specimens:   * No specimens in log *    Implants:  Implant Name Type Inv. Item Serial No.  Lot No. LRB No. Used Action   SCREW BNE L95MM DIA6.5MM THRD L16MM S STL RACHELLE HEX SOCK - [id number]  SCREW BNE L95MM DIA6.5MM THRD L16MM S STL RACHELLE HEX SOCK  [employer]  Left 1 Implanted   SCREW BNE L90MM DIA6.5MM THRD L16MM CANC S STL SELF DRL ST - [id number]  SCREW BNE L90MM DIA6.5MM THRD L16MM CANC S STL SELF DRL ST  DEPU[employer]WD  Left 2 Implanted         Drains:   Urinary Catheter 03/27/23 Tan (Active)   $ Urethral catheter insertion Inserted for procedure 03/28/23 0755   Catheter Indications Perioperative use for selected surgical procedures 03/28/23 0755   Site Assessment Pink;Moist 03/28/23 1607   Urine Color Yellow 03/28/23 1607   Urine Appearance Clear 03/28/23 1607   Urine Odor Malodorous 03/28/23 1607   Collection Container Standard 03/28/23 1607   Securement Method Securing device (Describe) 03/28/23 1607   Catheter Care  Other (comment) 03/28/23 1607   Catheter Best Practices  Drainage tube clipped to bed;Catheter secured to thigh; Tamper seal intact; Bag below bladder;Bag not on floor; Lack of dependent loop in tubing;Drainage bag less than half full 03/28/23 1607   Status Draining 03/28/23 1607   Output (mL) 350 mL 03/28/23 1607       [REMOVED] External Urinary Catheter (Removed)   Site Assessment Clean,dry & intact 03/27/23 1811   Perineal Care No 03/27/23 1811   Suction 40 mmgHg continuous 03/27/23 1811       Findings: Left hip subcapital femoral neck fracture -

## 2023-03-29 NOTE — DISCHARGE INSTRUCTIONS
Keep dressing clean and dry. Change dressing after 7 days, OK to shower after 4/5/23, place new dressing after showering . Please call physician if increased redness around incision, increased drainage, fevers, or pain becomes significantly worse. Do not immerse in water such as baths    F/U with Dr Ghazal Apodaca in 10-14 days. Call [facility] for appointment time and date for follow up at [phone]. Weight Bearing on Surgical Side: toe touch weightbearing    Continue DVT Prophylaxis: Continue home dose Eliquis 2.5 mg twice daily    Pain Medications  You were given oxycodone (Oxycontin, Oxyir)  Wean off pain medications as you deem appropriate as long as pain is under control  Be sure to drink plenty of fluids (recommend water) while taking narcotic pain medications to prevent constipation   You may take an over the counter laxative or stool softener as needed to prevent/treat constipation as well, we recommend Senokot S OTC. We recommend that you consider taking these medications the entire time you are taking pain medication. Cold packs/Ice packs/Machine  May be used as much as necessary to control swelling/inflammation/soreness  Be sure to have a barrier (cloth, clothing, towel) between the site and the ice pack to prevent [address] office [phone] if  Increased redness, swelling, drainage of any kind, and/or pain to surgery site. As well as new onset fevers and or chills. These could signify an infection. Calf or thigh tenderness to touch as well as increased swelling or redness. This could signify a clot formation. Numbness or tingling to an area around the incision site or below the incision site (toes). Any rash appears, increased  or new onset nausea/vomiting occur. This may indicate a reaction to a medication.

## 2023-03-30 VITALS
DIASTOLIC BLOOD PRESSURE: 72 MMHG | WEIGHT: 250 LBS | HEIGHT: 70 IN | SYSTOLIC BLOOD PRESSURE: 149 MMHG | RESPIRATION RATE: 18 BRPM | TEMPERATURE: 98.1 F | HEART RATE: 117 BPM | BODY MASS INDEX: 35.79 KG/M2 | OXYGEN SATURATION: 94 %

## 2023-03-30 LAB
GLUCOSE BLD-MCNC: 131 MG/DL (ref 70–99)
GLUCOSE BLD-MCNC: 147 MG/DL (ref 70–99)
GLUCOSE BLD-MCNC: 219 MG/DL (ref 70–99)
PERFORMED ON: ABNORMAL

## 2023-03-30 PROCEDURE — 94660 CPAP INITIATION&MGMT: CPT

## 2023-03-30 PROCEDURE — 6370000000 HC RX 637 (ALT 250 FOR IP): Performed by: ORTHOPAEDIC SURGERY

## 2023-03-30 PROCEDURE — 94761 N-INVAS EAR/PLS OXIMETRY MLT: CPT

## 2023-03-30 PROCEDURE — 2700000000 HC OXYGEN THERAPY PER DAY

## 2023-03-30 PROCEDURE — 2580000003 HC RX 258: Performed by: ORTHOPAEDIC SURGERY

## 2023-03-30 PROCEDURE — 6370000000 HC RX 637 (ALT 250 FOR IP): Performed by: SPECIALIST/TECHNOLOGIST

## 2023-03-30 PROCEDURE — 6370000000 HC RX 637 (ALT 250 FOR IP): Performed by: HOSPITALIST

## 2023-03-30 RX ORDER — OXYCODONE HYDROCHLORIDE 5 MG/1
5 TABLET ORAL EVERY 8 HOURS PRN
Qty: 15 TABLET | Refills: 0 | Status: SHIPPED | OUTPATIENT
Start: 2023-03-30 | End: 2023-04-04

## 2023-03-30 RX ADMIN — PIRFENIDONE 1 TABLET: 801 TABLET, FILM COATED ORAL at 17:56

## 2023-03-30 RX ADMIN — SODIUM CHLORIDE, PRESERVATIVE FREE 10 ML: 5 INJECTION INTRAVENOUS at 10:27

## 2023-03-30 RX ADMIN — APIXABAN 2.5 MG: 2.5 TABLET, FILM COATED ORAL at 10:26

## 2023-03-30 RX ADMIN — AMLODIPINE BESYLATE 5 MG: 5 TABLET ORAL at 10:26

## 2023-03-30 RX ADMIN — ASPIRIN 81 MG: 81 TABLET, COATED ORAL at 10:26

## 2023-03-30 RX ADMIN — PANTOPRAZOLE SODIUM 40 MG: 40 TABLET, DELAYED RELEASE ORAL at 10:26

## 2023-03-30 RX ADMIN — PIRFENIDONE 1 TABLET: 801 TABLET, FILM COATED ORAL at 10:21

## 2023-03-30 RX ADMIN — OXYCODONE HYDROCHLORIDE 10 MG: 5 TABLET ORAL at 17:56

## 2023-03-30 RX ADMIN — Medication 1 TABLET: at 10:26

## 2023-03-30 RX ADMIN — ISOSORBIDE MONONITRATE 30 MG: 30 TABLET, EXTENDED RELEASE ORAL at 10:26

## 2023-03-30 RX ADMIN — GABAPENTIN 300 MG: 300 CAPSULE ORAL at 17:56

## 2023-03-30 RX ADMIN — ACETAMINOPHEN 650 MG: 325 TABLET ORAL at 10:25

## 2023-03-30 RX ADMIN — POLYETHYLENE GLYCOL 3350 17 G: 17 POWDER, FOR SOLUTION ORAL at 10:26

## 2023-03-30 RX ADMIN — FUROSEMIDE 40 MG: 40 TABLET ORAL at 10:26

## 2023-03-30 RX ADMIN — ACETAMINOPHEN 650 MG: 325 TABLET ORAL at 17:56

## 2023-03-30 RX ADMIN — INSULIN GLARGINE 24 UNITS: 100 INJECTION, SOLUTION SUBCUTANEOUS at 10:27

## 2023-03-30 ASSESSMENT — PAIN DESCRIPTION - ORIENTATION
ORIENTATION: LEFT
ORIENTATION: LEFT

## 2023-03-30 ASSESSMENT — PAIN SCALES - GENERAL
PAINLEVEL_OUTOF10: 7
PAINLEVEL_OUTOF10: 6
PAINLEVEL_OUTOF10: 8

## 2023-03-30 ASSESSMENT — PAIN DESCRIPTION - DESCRIPTORS
DESCRIPTORS: ACHING;SORE
DESCRIPTORS: ACHING;SORE

## 2023-03-30 ASSESSMENT — PAIN DESCRIPTION - LOCATION
LOCATION: HIP
LOCATION: HIP

## 2023-03-30 NOTE — CARE COORDINATION
CASE MANAGEMENT DISCHARGE SUMMARY      Discharge to: [id number] completed: [facility] Exemption Notification (HENS) completed: Yes    IMM given: (date) Today      Transportation: [facility]     Medical Transport explained to [employer]. Pt/family voice no agency preference. Agency used: [facility]   time: 1830   Ambulance form completed: Yes    Confirmed discharge plan with: Santa Bustillos @ [facility] [place]     Patient: yes     Family:  yes    Name: Darlyn Garibay     Facility/Agency, name:  [facility] faxed   Phone number for report to facility: [phone]     RN, name: Aileen Montano    Note: Discharging nurse to complete [facility], reconcile [facility]S, and place final copy with patient's discharge packet. RN to ensure that written prescriptions for  Level II medications are sent with patient to the facility as per protocol.     Swati Barton RN
Writer received call form Ekaterina Reyna @ [facility] AT [place] she can accept patient when ready, no cert needed.        Elaine Charles RN
Writer reviewed chart day 2, patient was a late surgery, will watch for another day, likely DC 3/30 to [facility] [place].       Amanda Varghese RN
who? No  Plans to Return to Present Housing: Unknown at present  Other Identified Issues/Barriers to RETURNING to current housing: Limited care at home  Potential Assistance needed at discharge: N/A            Potential DME:    Patient expects to discharge to: [address] [place] [address] for transportation at discharge:      Financial    Payor: [address] [place] [address] / Plan: MEDICARE PART A AND B / Product Type: *No Product type* /     Does insurance require precert for SNF: No    Potential assistance Purchasing Medications: No  Meds-to-Beds request:        Merrill Vega [id number] Tiana Delarosa, [address]  [address]  Phone: [phone] Fax: [phone]      Notes:    Factors facilitating achievement of predicted outcomes: Family support    Barriers to discharge: none    Additional Case Management Notes: Patient lives with spouse in an apt with 1 Rob, no steps once in apt. Patient has a rollator, can that he uses inside. Cpap supplies from [address]. Patient is open to SNF and would like Luis Domingo will send a referral. Patient doesn't drive. The Plan for Transition of Care is related to the following treatment goals of Need for diphtheria-tetanus-pertussis (Tdap) vaccine [Z23]  On anticoagulant therapy [Z79.01]  Closed fracture of left hip, initial encounter ([facility] [address] Denisse Frock  Fall, initial encounter [W19. XXXA]  Closed subcapital fracture of femur, left, initial encounter ([facility] [address] [S72.012A]  Abrasion, left knee, initial encounter [G47.072L]    IF APPLICABLE: The Patient and/or patient representative Lobo Vale and his family were provided with a choice of provider and agrees with the discharge plan.  Freedom of choice list with basic dialogue that supports the patient's individualized plan of care/goals and shares the quality data associated with the providers was provided to: Patient   Patient Representative Name:       The Patient and/or Patient Representative Agree with the

## 2023-03-30 NOTE — PROGRESS NOTES
03/27/23 6122   NIV Type   $NIV $Daily Charge   NIV Started/Stopped On   Equipment Type v60   Mode Bilevel   Mask Type Full face mask   Mask Size Medium   Settings/Measurements   IPAP 14 cmH20   CPAP/EPAP 10 cmH2O   Vt (Measured) 506 mL   Rate Ordered 16   Resp 22   FiO2  30 %   Mask Leak (lpm) 4 lpm   Comfort Level Fair   Using Accessory Muscles No   SpO2 96   Patient's Home Machine No   Alarm Settings   Alarms On Y   Low Pressure (cmH2O) 6 cmH2O   High Pressure (cmH2O) 30 cmH2O   Patient Observation   Observations mepilex on nose
03/28/23 0011   NIV Type   $NIV $Daily Charge   NIV Started/Stopped On   Equipment Type v60   Mode Bilevel   Mask Type Full face mask   Mask Size Medium   Settings/Measurements   IPAP 14 cmH20   CPAP/EPAP 10 cmH2O   Vt (Measured) 601 mL   Rate Ordered 16   Resp 23   FiO2  30 %   Mask Leak (lpm) 10 lpm   Comfort Level Fair   Using Accessory Muscles No   SpO2 97   Patient's Home Machine No
03/28/23 0326   NIV Type   NIV Started/Stopped On   Equipment Type v60   Mode Bilevel   Mask Type Full face mask   Mask Size Medium   Settings/Measurements   IPAP 14 cmH20   CPAP/EPAP 10 cmH2O   Vt (Measured) 535 mL   Rate Ordered 16   Resp 25   FiO2  30 %   Mask Leak (lpm) 16 lpm   Comfort Level Fair   Using Accessory Muscles No   SpO2 96   Patient's Home Machine No   Alarm Settings   Alarms On Y   Low Pressure (cmH2O) 6 cmH2O   High Pressure (cmH2O) 30 cmH2O
03/29/23 2259   NIV Type   Skin Assessment Clean, dry, & intact   Skin Protection for O2 Device Yes   Location Nose   NIV Started/Stopped On   Equipment Type v60   Mode Bilevel   Mask Type Full face mask   Mask Size Medium   Settings/Measurements   PIP Observed 10 cm H20  (ramp applied for pt comfort)   IPAP 14 cmH20   CPAP/EPAP 10 cmH2O   Vt (Measured) 668 mL   Rate Ordered 16   Resp 24   Insp Rise Time (%) 1 %   FiO2  28 %   I Time/ I Time % 1 s   Minute Volume (L/min) 16.2 Liters   Mask Leak (lpm) 24 lpm   Comfort Level Good   Using Accessory Muscles No   SpO2 98   Patient's Home Machine No   Alarm Settings   Alarms On Y   Low Pressure (cmH2O) 6 cmH2O   High Pressure (cmH2O) 30 cmH2O   Delay Alarm 20 sec(s)   RR Low (bpm) 6   RR High (bpm) 40 br/min
03/30/23 0407   NIV Type   Location Nose   NIV Started/Stopped On   Equipment Type v60   Mode Bilevel   Mask Type Full face mask   Mask Size Medium   Settings/Measurements   PIP Observed 15 cm H20   IPAP 14 cmH20   CPAP/EPAP 10 cmH2O   Vt (Measured) 524 mL   Rate Ordered 16   Resp 18   Insp Rise Time (%) 1 %   FiO2  28 %   I Time/ I Time % 1 s   Minute Volume (L/min) 9.4 Liters   Mask Leak (lpm) 27 lpm   Comfort Level Good   Using Accessory Muscles No   SpO2 96   Patient's Home Machine No   Alarm Settings   Alarms On Y   Low Pressure (cmH2O) 6 cmH2O   High Pressure (cmH2O) 30 cmH2O   Delay Alarm 20 sec(s)   RR Low (bpm) 6   RR High (bpm) 40 br/min
D: Patient here from OR via bed, taken to bay 2 in PACU, all current drips, treatments, skin issue, and plan of care were reviewed by both RN's, patient transferred in stable condition, patient wakens when spoken to, denies pain at present, call light is in reach. A: Assessment completed and documented, patient very sleepy.
Informed consent signed by patient and  is in the chart.
Multiple labs were not completed in ED or overnight. Spoke with lab this morning to add on remaining labs that were not collected. Secure message to MD to see if lactic still is needed as patient has now been on fluids overnight. Per SWAPNA Nino, lactic not needed.  Order has been discontinued
Patient refusing BIPAP machine at this time. RN notified.
Patient to surgery, wedding ring in lock box in room. Dentures removed and left on tray table in room. Consent is signed and in the chart. Ancef sent with patient. Report called to PACU. VSS at this time, CMU aware of pt going to surgery.
Patient transported back to 541 by Arnaldo Romo, bedside report given.
Patient: William J Hohweiler MRN: [id number]  Date of  Admission: 3/27/2023   YOB: 1940  Age: 82 y.o.  Sex: male    Unit: [place] [address] MED SURG/ORTHO  Room/Bed: 0541/0541-01 Admitting Physician: SHARON MARTINEZ    Attending Physician:  Serjio Romero MD         Pulmonary Service Note    This an 82-year-old gentleman who I follow in the office.  He has IPF and is on Esbriet 801 mg 3 times daily, and BiPAP therapy of 14/10.  Using a full facemask.  Patient came to the hospital because of a fall.  Patient sustained a left hip injury resulting in a fracture and will be going for surgery.  Patient denies any trouble breathing at this time.    SUBJECTIVE:    No issues overnight  Postoperative care was normal  Used BiPAP overnight  No chest pains or palpitations  Left hip repair was well  Patient was walking last night and this morning  Sitting in a chair        ROS:  A comprehensive review of systems was negative except for: above    OBJECTIVE    Medications    Continuous Infusions:   dextrose      sodium chloride 20 mL/hr at 03/29/23 0610       Scheduled Meds:   apixaban  2.5 mg Oral BID    acetaminophen  650 mg Oral Q6H    clonazePAM  1 mg Oral Nightly    sodium chloride flush  5-40 mL IntraVENous 2 times per day    ceFAZolin (ANCEF) IVPB  2,000 mg IntraVENous Q8H    polyethylene glycol  17 g Oral Daily    aspirin  81 mg Oral Daily    atorvastatin  40 mg Oral Nightly    furosemide  40 mg Oral QAM    gabapentin  300 mg Oral QPM    isosorbide mononitrate  30 mg Oral Daily    therapeutic multivitamin-minerals  1 tablet Oral Daily    nortriptyline  50 mg Oral Nightly    pantoprazole  40 mg Oral QAM AC    Pirfenidone  1 tablet Oral TID WC    amLODIPine  5 mg Oral Daily    sodium chloride flush  10 mL IntraVENous 2 times per day    lidocaine  1 patch TransDERmal Daily    insulin glargine  24 Units SubCUTAneous BID    insulin glargine  10 Units SubCUTAneous Once    insulin lispro  0-8 Units SubCUTAneous Q4H 
Physical Therapy/Occupational Therapy:  Orders received from [facility]. Upon chart review pt scheduled for L hip pinning surgery today. Will sign off and await ortho orders post op.   [address] OT
Physician Progress Note      PATIENT:               Jaspal Willoughby  CSN #:                  [id number]  :                       1940  ADMIT DATE:       3/27/2023 1:26 PM  DISCH DATE:  RESPONDING  PROVIDER #:        Solange Mckenzie          QUERY TEXT:    Pt admitted with fall/fracture. Pt noted to have pulmonary vascular   congestion/edema on H&P treated with Lasix gtt. If possible, please document   in the progress notes and discharge summary if you are evaluating and/or   treating any of the following: The medical record reflects the following:  Risk Factors: VESTA, Pulmonary fibrosis, anemia, RLD, pulmonary congestion on   CXR, Bipap at HS support for VESTA  Clinical Indicators: per H&P 3/27- \"Pulmonary vascular congestion/edema\"  per Pulmonary c/s 3/27-\" Patient also has some pulmonary venous congestion,   Patient may benefit from some diuresis\"  per cardiology consult 3/28-\"No concerning CHF. Jim Welsh D/C IV lasix gtt\"  BNP 17  Treatment: IV lasix 20 mg, IV lasix gtt x 14 hours, Lasix 40 mg po started   daily on 3/28, Cardiology/Pulmonary/Ortho consults, ortho surgery/recovery   pending, Bipap at HS support as at home    Thank you,  Ilsa Chavez RN BSN  Options provided:  -- Noncardiogenic acute pulmonary edema  -- Other - I will add my own diagnosis  -- Disagree - Not applicable / Not valid  -- Disagree - Clinically unable to determine / Unknown  -- Refer to Clinical Documentation Reviewer    PROVIDER RESPONSE TEXT:    This patient has noncardiogenic acute pulmonary edema.     Query created by: Po Jang on 3/28/2023 10:20 AM      Electronically signed by:  Solange Mckenzie 3/29/2023 8:56 AM
Physician Progress Note      PATIENT:               Rick Bay  CSN #:                  [id number]  :                       1940  ADMIT DATE:       3/27/2023 1:26 PM  DISCH DATE:  RESPONDING  PROVIDER #:        Amie Ivan          QUERY TEXT:    Pt admitted with fracture. Pt noted to have osteopenic bones per hip XR. Also   Ortho Op note of states \"Bone quality was satisfactory but not robust, as was   anticipated given patients age. \" If possible, please document in progress   notes and discharge summary if you are evaluating and/or treating any of the   following: The medical record reflects the following:  Risk Factors: fall from ground height walking, 81 yr old male,  Clinical Indicators: XR imaging and Op note bone quality findings  Treatment: Hip pinning, imaging, ortho consult/surgery    Thank you, Geno Ng RN BSN  Options provided:  -- Osteoporotic left femur fracture following fall which would not usually   break a normal, healthy bone  -- Traumatic left femur fracture  -- Other - I will add my own diagnosis  -- Disagree - Not applicable / Not valid  -- Disagree - Clinically unable to determine / Unknown  -- Refer to Clinical Documentation Reviewer    PROVIDER RESPONSE TEXT:    This patient has an osteoporotic left femur fracture following fall which   would not break a normal, healthy bone.     Query created by: Juan Manuel Bennett on 3/29/2023 9:06 AM      Electronically signed by:  Amie Ivan 3/30/2023 8:45 AM
Responded to consult for prayer. Pt talking about his health and his wife's. Pt seems to be worried about his health as he asked for prayer for a healing. Writer offered listening presence and prayed with pt and spouse. Spiritual care available to follow as needed.
Teaching / education initiated regarding perioperative experience, expectations, and pain management during stay. Patient verbalized understanding.
Verified with CMU that patient is on the monitor and returning to 541.
03/29/2023 06:39 AM            Assessment:      Status post left hip pinning. DOS 3/28/23 by Dr. Marilynn Morillo. POD 2, doing well post operatively. Plan:      1:  Continue current post operative course. Toe touch weight bearing to left lower extremity. 2:  Continue Deep venous thrombosis prophylaxis. SCD, nishi hose, and OK to resume home dose eliquis    3:  Continue Pain Control  4: Continue therapy. Recommending SNF upon discharge, CM following. Okay to d/c  5: Discharge pending medical stability, pain control, and therapy.     Kayy Alanis
Component Value Date/Time     03/29/2023 06:39 AM    K 5.3 03/29/2023 06:39 AM    K 4.1 03/27/2023 04:57 PM    CL 95 03/29/2023 06:39 AM    CO2 34 03/29/2023 06:39 AM    BUN 29 03/29/2023 06:39 AM    CREATININE 1.3 03/29/2023 06:39 AM    GLUCOSE 166 03/29/2023 06:39 AM    CALCIUM 8.8 03/29/2023 06:39 AM            Assessment:      Status post left hip pinning. DOS 3/28/23 by Dr. Marilynn Morillo. POD 1, doing well post operatively. Plan:      1:  Continue current post operative course. Toe touch weight bearing to left lower extremity. 2:  Continue Deep venous thrombosis prophylaxis. SCD, nishi hose, and OK to resume home dose eliquis after 1300 POD 1.   3:  Continue Pain Control  4: Continue therapy. Recommending SNF upon discharge, CM following  5: Discharge pending medical stability, pain control, and therapy.     [place]ev [place]
Oral QAM    gabapentin  300 mg Oral QPM    isosorbide mononitrate  30 mg Oral Daily    therapeutic multivitamin-minerals  1 tablet Oral Daily    nortriptyline  50 mg Oral Nightly    pantoprazole  40 mg Oral QAM AC    Pirfenidone  1 tablet Oral TID WC    amLODIPine  5 mg Oral Daily    sodium chloride flush  10 mL IntraVENous 2 times per day    lidocaine  1 patch TransDERmal Daily    insulin glargine  24 Units SubCUTAneous BID    insulin glargine  10 Units SubCUTAneous Once    insulin lispro  0-8 Units SubCUTAneous Q4H     PRN Meds: glucose, dextrose bolus **OR** dextrose bolus, glucagon (rDNA), dextrose, sodium chloride flush, sodium chloride, potassium chloride **OR** potassium alternative oral replacement **OR** potassium chloride, magnesium sulfate, senna, acetaminophen **OR** acetaminophen, ondansetron **OR** ondansetron, HYDROmorphone      Intake/Output Summary (Last 24 hours) at 3/28/2023 0906  Last data filed at 3/28/2023 3025  Gross per 24 hour   Intake --   Output 1150 ml   Net -1150 ml       Physical Exam Performed:    BP (!) 150/67   Pulse 98   Temp 98.1 °F (36.7 °C) (Oral)   Resp 18   Ht 5' 10\" (1.778 m)   Wt 250 lb (113.4 kg)   SpO2 99%   BMI 35.87 kg/m²     General appearance: No apparent distress, appears stated age and cooperative. HEENT: Pupils equal, round, and reactive to light. Conjunctivae/corneas clear. Neck: Supple, with full range of motion. No jugular venous distention. Trachea midline. Respiratory:  Normal respiratory effort. Clear to auscultation, bilaterally without Rales/Wheezes/Rhonchi. Cardiovascular: Regular rate and rhythm with normal S1/S2 without murmurs, rubs or gallops. Abdomen: Soft, non-tender, non-distended with normal bowel sounds. Musculoskeletal: Left hip tender to evaluation no clubbing, cyanosis or edema bilaterally. Full range of motion without deformity. Skin: Skin color, texture, turgor normal.  No rashes or lesions.   Neurologic:  Neurovascularly intact
review and correction are routinely performed, please contact the office/medical records for any errors requiring correction.
Goals   Patient Goals : to go home       Education  Patient Education  Education Given To: Patient  Education Provided: Role of Therapy;Plan of Care;Home Exercise Program;Precautions; Equipment;Transfer Training  Education Method: Verbal  Barriers to Learning: Hearing  Education Outcome: Verbalized understanding;Demonstrated understanding      Therapy Time   Individual Concurrent Group Co-treatment   Time In 0932         Time Out 1005         Minutes 33         Timed Code Treatment Minutes: 23 Minutes     If pt is unable to be seen after this session, please let this note serve as discharge summary. Please see case management note for discharge disposition. Thank you.     Kemi Johnson, PT
Sit: 2 Person assistance;Maximum assistance  Sit to Supine: Unable to assess  Transfers  Sit Pivot Transfers: 2 Person assistance;Maximum assistance  Sit to stand: Maximum assistance;2 Person assistance  Stand to sit: Maximum assistance;2 Person assistance  Vision  Vision: Impaired  Vision Exceptions: Wears glasses for reading  Hearing  Hearing: Exceptions to [facility]  Hearing Exceptions: Hard of hearing/hearing concerns (used to have hearing aids, lost them)  Cognition  Overall Cognitive Status: Exceptions  Arousal/Alertness: Appropriate responses to stimuli  Following Commands: Follows one step commands with repetition; Follows one step commands with increased time  Attention Span: Appears intact  Memory: Decreased recall of recent events  Safety Judgement: Decreased awareness of need for assistance  Problem Solving: Assistance required to generate solutions  Insights: Fully aware of deficits  Initiation: Does not require cues  Sequencing: Requires cues for some  Orientation  Overall Orientation Status: Impaired  Orientation Level: Disoriented to time;Oriented to person;Oriented to situation;Oriented to place               Exercise Treatment: x10 BUE AROM therex in chair  A/AROM Exercises: elbow flexion/extension and shoulder press. Practice chair pushup 1x  Education Given To: Patient  Education Provided: Role of Therapy;Plan of Care;Precautions; ADL Adaptive Strategies;Transfer Training  Education Method: Verbal;Demonstration  Barriers to Learning: None  Education Outcome: Demonstrated understanding;Continued education needed   Disease Specific Education: Pt educated on weight bearing status, post-op precautions, appropriate DME, and safe mobility with AD.  Pt verbalized understanding    AM-PAC Score        AM-PAC Inpatient Daily Activity Raw Score: 15 (03/29/23 1326)  AM-PAC Inpatient ADL T-Scale Score : 34.69 (03/29/23 1326)  ADL Inpatient CMS 0-100% Score: 56.46 (03/29/23 1326)  ADL Inpatient CMS G-Code Modifier : CK
day        Obstructive sleep apnea  Normally the patient is on 14/10 of BiPAP therapy  Can continue with this    However if the patient becomes more somnolent or other PCO2 increases  The following should be used  AVAPS therapy, rate of 18, tidal volume 500, IPAP minimum of 14 and an pressure support max of 40, EPAP of 6720 Rob Valdez MD [facility]  [phone]      Please note that some or all of this record was generated using voice recognition software. If there are any questions about the content of this document, please contact the author as some errors in transcription may have occurred.
SNF    Dispo-1 to 2 days pending placement, pain control, and progression of therapy    Appropriate for A1 Discharge Unit: SWAPNA Iniguez

## 2023-03-30 NOTE — DISCHARGE SUMMARY
is   unchanged. CT C-Spine W/O Contrast   Final Result   No acute intracranial abnormality. Stable lateral alignment with no evidence of acute traumatic injury. Anterior fusion betweenand C7 is stable. Minimal anterolisthesis of C3 over   C4 is likely degenerative. No acute soft tissue abnormality. CT Head W/O Contrast   Final Result   No acute intracranial abnormality. Stable lateral alignment with no evidence of acute traumatic injury. Anterior fusion betweenand C7 is stable. Minimal anterolisthesis of C3 over   C4 is likely degenerative. No acute soft tissue abnormality. CT HIP LEFT WO CONTRAST   Final Result   Minimally impacted left subcapital femoral neck fracture. 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 XR CHEST PORTABLE   Final Result   Mild cardiomegaly, pulmonary vascular congestion, and interstitial opacities   consistent with interstitial edema. However, infection is not fully excluded. 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 XR HIP 2-3 VW W PELVIS LEFT   Final Result   Acute traumatic nondisplaced minimally impacted subcapital left femoral neck   fracture.                 Consults:     IP CONSULT TO ORTHOPEDIC SURGERY  IP CONSULT TO PULMONOLOGY  IP CONSULT TO SPIRITUAL SERVICES    Disposition: [facility], [facility]    Condition at Discharge: Stable    Discharge Instructions/Follow-up:    Follow-up with orthopedics as scheduled  Follow-up with PCP as needed  PT/OT/SN    Code Status:  Prior full    Activity: activity as tolerated    Diet: regular diet, diabetic diet, and low fat, low cholesterol diet      Discharge Medications:     Discharge Medication List as of 3/30/2023  4:39 PM             Details   oxyCODONE (ROXICODONE) 5 MG immediate release tablet Take 1 tablet by mouth every 8 hours as needed for Pain for

## 2023-03-30 NOTE — ED PROVIDER NOTES
Abrasion, left knee, initial encounter  S80.212A       3. Fall, initial encounter  [address] XXXA       4. On anticoagulant therapy  Z79.01       5. Need for diphtheria-tetanus-pertussis (Tdap) vaccine  Z23             I, Amelie Keller DO am the primary clinician of record. -    This chart was generated in part by using Dragon Dictation system and may contain errors related to that system including errors in grammar, punctuation, and spelling, as well as words and phrases that may be inappropriate. If there are any questions or concerns please feel free to contact the dictating provider for clarification.      AMELIE KELLER DO  [facility]  03/30/23 9924
neck supple. Right lower leg: No edema. Left lower leg: No edema. Comments: Patient with very subtle abrasion on the anterior aspect of the left knee. Not repairable and not bleeding. No serous drainage. Patient has pillow under knee propping slightly to alleviate some discomfort involving the left hip. Pain with gentle pressure over the left hip. No obvious rotation or shortening at this time. Will evaluate imaging. Skin:     General: Skin is warm and dry. Neurological:      General: No focal deficit present. Mental Status: He is alert and oriented to person, place, and time. Mental status is at baseline. Psychiatric:         Mood and Affect: Mood normal.         Behavior: Behavior normal.         Thought Content:  Thought content normal.         Judgment: Judgment normal.       DIAGNOSTIC RESULTS   LABS:    Labs Reviewed   CBC WITH AUTO DIFFERENTIAL - Abnormal; Notable for the following components:       Result Value    RBC 3.84 (*)     Hemoglobin 12.2 (*)     Hematocrit 35.6 (*)     All other components within normal limits   COMPREHENSIVE METABOLIC PANEL W/ REFLEX TO MG FOR LOW K - Abnormal; Notable for the following components:    Potassium reflex Magnesium 5.3 (*)     Glucose 149 (*)     BUN 22 (*)     Albumin/Globulin Ratio 1.0 (*)     All other components within normal limits   PROTIME-INR - Abnormal; Notable for the following components:    Protime >120.0 (*)     INR >16.29 (*)     All other components within normal limits    Narrative:     CALL  [place]  [facility] tel. [phone],  Chemistry results called to and read back Raheem Reynoso RN ED, 03/27/2023  16:50, by FLOWER   APTT - Abnormal; Notable for the following components:    aPTT 45.4 (*)     All other components within normal limits   BASIC METABOLIC PANEL W/ REFLEX TO MG FOR LOW K - Abnormal; Notable for the following components:    CO2 33 (*)     Glucose 122 (*)     BUN 22 (*)     All other components within normal limits

## 2023-03-31 LAB
NT-PROBNP SERPL-MCNC: <36 PG/ML (ref 0–449)
T4 FREE SERPL-MCNC: 1 NG/DL (ref 0.9–1.8)
TSH SERPL DL<=0.005 MIU/L-ACNC: 1.21 UIU/ML (ref 0.27–4.2)

## 2023-04-03 ENCOUNTER — TELEPHONE (OUTPATIENT)
Dept: ORTHOPEDIC SURGERY | Age: 83
End: 2023-04-03

## 2023-04-03 NOTE — TELEPHONE ENCOUNTER
Medical Facility Question     Facility Name: [facility] [place] [address]  Contact Name: Amaury Gonzales Number: [phone]  Request or Information: Tani IS ASKING IF PATIENT SHOULD BE BROUGHT IN BY STRETCHER TO MAKE F/U FOR HIP EASIER. PLEASE RETURN CALL TO THE ABOVE NUMBER.

## 2023-04-05 ENCOUNTER — TELEPHONE (OUTPATIENT)
Dept: ORTHOPEDIC SURGERY | Age: 83
End: 2023-04-05

## 2023-04-05 NOTE — TELEPHONE ENCOUNTER
I have spoken with the wife. Per  discharge paperwork he is OK to change the dressing for is PO sx shower today. I have contacted the facility and talked to Eva who is a unit manager onsite.

## 2023-04-27 ENCOUNTER — TELEPHONE (OUTPATIENT)
Dept: ORTHOPEDIC SURGERY | Age: 83
End: 2023-04-27

## 2023-04-27 DIAGNOSIS — Z47.89 ORTHOPEDIC AFTERCARE: Primary | ICD-10-CM

## 2023-04-27 PROBLEM — Z01.818 PREOPERATIVE CLEARANCE: Status: RESOLVED | Noted: 2023-03-28 | Resolved: 2023-04-27

## 2023-04-27 NOTE — TELEPHONE ENCOUNTER
PATIENT WOULD LIKE TO GO TO [facility] PHYSICAL THERAPY       Gaby Ayala.  [place] [address]     [phone] Mohan Johnson

## 2023-05-09 ENCOUNTER — OFFICE VISIT (OUTPATIENT)
Dept: ORTHOPEDIC SURGERY | Age: 83
End: 2023-05-09

## 2023-05-09 VITALS — WEIGHT: 250 LBS | HEIGHT: 70 IN | BODY MASS INDEX: 35.79 KG/M2

## 2023-05-09 DIAGNOSIS — Z47.89 ORTHOPEDIC AFTERCARE: Primary | ICD-10-CM

## 2023-05-09 NOTE — PROGRESS NOTES
POST OPERATIVE ORTHOPAEDIC NOTE    DOS: 3/28/2023  PROCEDURES: Left hip closed reduction percutaneous pinning    The patient has been wearing. The patient states the pain is 6/10 pain at its worst.  Otherwise 3/10 pain had. The patient has started PT. he states he is only been toe-touch weightbearing but admits to slightly putting more weight on the leg than had initially been discussed. Focused pertinent physical examination of the operative extremity:  Wound C/D/I, well healed surgical incision  No erythema or drainage  Nontender to palpation throughout left hip incision  Skin intact throughout  5/5 IP Q H TA G EHL  SILT DP SP LP MP S S  +2 DP pulse    Radiographs: 2 view left hip to include AP pelvis: Potential radiographic projection however it would appear that all 3 screws have compressed at the fracture site with slight backing out but it is symmetric throughout all 3. Diagnosis Orders   1. Orthopedic aftercare  XR HIP LEFT (2-3 VIEWS)        Assessment and plan: The patient is now 6 weeks status post the above listed procedure and is hovering    . --Greater than 50% of the time (11/20 minutes) was spent coordinating care and discussing postoperative recovery course  -I had a pleasant discussion with the patient today and his wife. I reviewed with him both that while his fracture has somewhat compressed at the site and slight backing out of the screws these appear to be symmetric throughout and there is no cut out through the actual femoral head. This is encouraging. He also has no tenderness to palpation present therapy.  -At this time he may increase his weightbearing status to 50% over the next 2 weeks, from week 2-4 from now he may then increase to weightbearing as tolerated using assist devices, and then from week 4-6 from now, he may wean off assist device as able to. This progression is to allow for continued healing given relative findings on radiographs today.   A letter was written to

## 2023-06-19 RX ORDER — PIRFENIDONE 801 MG/1
TABLET, COATED ORAL
Qty: 90 TABLET | Refills: 11 | Status: SHIPPED | OUTPATIENT
Start: 2023-06-19

## 2023-06-19 NOTE — TELEPHONE ENCOUNTER
Last office visit 11/30/2022     Last written 7/18/2022     Next office visit scheduled 6/21/2023    Requested Prescriptions     Pending Prescriptions Disp Refills    ESBRIET 801 MG TABS [Pharmacy Med Name: ESBRIET 801 MG TABLET] 90 tablet 11     Sig: TAKE 1 TABLET THREE TIMES A DAY WITH FOOD

## 2023-06-21 ENCOUNTER — OFFICE VISIT (OUTPATIENT)
Dept: PULMONOLOGY | Age: 83
End: 2023-06-21
Payer: MEDICARE

## 2023-06-21 VITALS
SYSTOLIC BLOOD PRESSURE: 148 MMHG | OXYGEN SATURATION: 95 % | BODY MASS INDEX: 34.65 KG/M2 | HEART RATE: 100 BPM | DIASTOLIC BLOOD PRESSURE: 84 MMHG | WEIGHT: 242 LBS | RESPIRATION RATE: 20 BRPM | HEIGHT: 70 IN

## 2023-06-21 DIAGNOSIS — E66.2 CLASS 2 OBESITY WITH ALVEOLAR HYPOVENTILATION AND BODY MASS INDEX (BMI) OF 35.0 TO 35.9 IN ADULT, UNSPECIFIED WHETHER SERIOUS COMORBIDITY PRESENT (HCC): ICD-10-CM

## 2023-06-21 DIAGNOSIS — Z99.89 OSA ON CPAP: ICD-10-CM

## 2023-06-21 DIAGNOSIS — J84.112 IPF (IDIOPATHIC PULMONARY FIBROSIS) (HCC): ICD-10-CM

## 2023-06-21 DIAGNOSIS — G47.33 OSA ON CPAP: ICD-10-CM

## 2023-06-21 DIAGNOSIS — J98.6 PARALYZED HEMIDIAPHRAGM: ICD-10-CM

## 2023-06-21 DIAGNOSIS — J84.10 PULMONARY FIBROSIS (HCC): Primary | ICD-10-CM

## 2023-06-21 PROCEDURE — 1123F ACP DISCUSS/DSCN MKR DOCD: CPT | Performed by: INTERNAL MEDICINE

## 2023-06-21 PROCEDURE — G8417 CALC BMI ABV UP PARAM F/U: HCPCS | Performed by: INTERNAL MEDICINE

## 2023-06-21 PROCEDURE — G8427 DOCREV CUR MEDS BY ELIG CLIN: HCPCS | Performed by: INTERNAL MEDICINE

## 2023-06-21 PROCEDURE — 3077F SYST BP >= 140 MM HG: CPT | Performed by: INTERNAL MEDICINE

## 2023-06-21 PROCEDURE — 99214 OFFICE O/P EST MOD 30 MIN: CPT | Performed by: INTERNAL MEDICINE

## 2023-06-21 PROCEDURE — 3079F DIAST BP 80-89 MM HG: CPT | Performed by: INTERNAL MEDICINE

## 2023-06-21 PROCEDURE — 1036F TOBACCO NON-USER: CPT | Performed by: INTERNAL MEDICINE

## 2023-06-21 RX ORDER — OMEPRAZOLE 40 MG/1
40 CAPSULE, DELAYED RELEASE ORAL
Qty: 90 CAPSULE | Refills: 1 | Status: SHIPPED | OUTPATIENT
Start: 2023-06-21

## 2023-06-21 ASSESSMENT — ENCOUNTER SYMPTOMS
EYES NEGATIVE: 1
RESPIRATORY NEGATIVE: 1
ALLERGIC/IMMUNOLOGIC NEGATIVE: 1
GASTROINTESTINAL NEGATIVE: 1

## 2023-06-21 NOTE — PROGRESS NOTES
Maris Singh (:  1940) is a 81 y.o. male,Established patient, here for evaluation of the following chief complaint(s):  Follow-up (6 MONTH, sleep/pulm) and Sleep Apnea         ASSESSMENT/PLAN:  1. Pulmonary fibrosis ([address]  2. Paralyzed hemidiaphragm  3. VESTA on CPAP  4. IPF (idiopathic pulmonary fibrosis) (HCC)  5. Class 2 obesity with alveolar hypoventilation and body mass index (BMI) of 35.0 to 35.9 in adult, unspecified whether serious comorbidity present ([address]    Paralyzed left diaphragm  Seen by ent but nothing wrong  PFt did not show flow volume loop obstructions  Has had problems with sob for sometime    Has been worked by by ENT, Cardiology    Had bronch which showed no problems    Has tried inhalers and still not helpiing    Has + sniff test for paralyzed Left diaphragm    Tried pulm rehab and didn't help   Spirometry shows moderate restrictive defect   Stridor is resolved          Went to [facility]- for evaluation of paralyzed left diaphragm and had surgery 2016  For pulling down the diaphragm    cxr done 21    LUNGS/PLEURA:  Chronic diffuse interstitial pulmonary fibrosis. No acute interval change. IMPRESSION:   1. Chronic diffuse interstitial pulmonary fibrosis. 2.  No acute interval change.    SIGNED BY: Marielos Engel MD on 2021  4:39 PM         Will need to continue and add more exercise          As tried, Zoey Heman, advair, symbicort, sprivia, breo, anoro and nothing has helped with the breathing      Pulmonary fibrosis  Continue with:  Esbriet 801mg three times a day (still taking 3 tabs TID)  Will need to check liver function  Last done 21- normal  Oxygen at 2 lpm    GERD  Continue with  Omeprazole can increase to twice a day if needed        PE  On eliquis 2.5 mg twice a day    Continue with exercise despite the occasional weakness     Need to do upper body exercise      VESTA  bipap is 14/10  Set up date was 3/22/2018  ti max of 1.6 and

## 2023-06-21 NOTE — PATIENT INSTRUCTIONS
ASSESSMENT/PLAN:  1. Pulmonary fibrosis (HCC)  2. Paralyzed hemidiaphragm  3. VESTA on CPAP  4. IPF (idiopathic pulmonary fibrosis) (HCC)  5. Class 2 obesity with alveolar hypoventilation and body mass index (BMI) of 35.0 to 35.9 in adult, unspecified whether serious comorbidity present [address] [address]    Paralyzed left diaphragm  Seen by ent but nothing wrong  PFt did not show flow volume loop obstructions  Has had problems with sob for sometime    Has been worked by by ENT, Cardiology    Had bronch which showed no problems    Has tried inhalers and still not helpiing    Has + sniff test for paralyzed Left diaphragm    Tried pulm rehab and didn't help   Spirometry shows moderate restrictive defect   Stridor is resolved          Went to [facility]- for evaluation of paralyzed left diaphragm and had surgery 8/29/2016  For pulling down the diaphragm    cxr done 9/20/21    LUNGS/PLEURA:  Chronic diffuse interstitial pulmonary fibrosis. No acute interval change. IMPRESSION:   1. Chronic diffuse interstitial pulmonary fibrosis. 2.  No acute interval change.    SIGNED BY: Norman Younger MD on 9/20/2021  4:39 PM         Will need to continue and add more exercise          As tried, Tono Waters, svetlana, symbicort, sprivia, breo, anoro and nothing has helped with the breathing      Pulmonary fibrosis  Continue with:  Esbriet 801mg three times a day (still taking 3 tabs TID)  Will need to check liver function  Last done 9/28/21- normal  Oxygen at 2 lpm    GERD  Continue with  Omeprazole can increase to twice a day if needed        PE  On eliquis 2.5 mg twice a day    Continue with exercise despite the occasional weakness     Need to do upper body exercise      VESTA  bipap is 14/10  Set up date was 3/22/2018  ti max of 1.6 and min 0.4  Trigger and cycle are med    Full facemask    Humidity at 4  Using 100 % of time  Using 11 h/night    No leak at 13 lpm  No sleep apnea, ahi is 0.2   tv is 662  mv is 14.3 lpm    If

## 2023-06-21 NOTE — PROGRESS NOTES
MA Communication: The following orders are received by verbal communication from Jose Bryson MD    Orders include:  6 month f/u, send orders to DME.

## 2023-06-27 ENCOUNTER — OFFICE VISIT (OUTPATIENT)
Dept: ORTHOPEDIC SURGERY | Age: 83
End: 2023-06-27

## 2023-06-27 VITALS — WEIGHT: 242 LBS | HEIGHT: 70 IN | BODY MASS INDEX: 34.65 KG/M2

## 2023-06-27 DIAGNOSIS — Z47.89 ORTHOPEDIC AFTERCARE: Primary | ICD-10-CM

## 2023-06-27 PROCEDURE — 99024 POSTOP FOLLOW-UP VISIT: CPT | Performed by: ORTHOPAEDIC SURGERY

## 2023-06-28 ENCOUNTER — TELEPHONE (OUTPATIENT)
Dept: ORTHOPEDIC SURGERY | Age: 83
End: 2023-06-28

## 2023-07-05 ENCOUNTER — HOSPITAL ENCOUNTER (OUTPATIENT)
Dept: CT IMAGING | Age: 83
Discharge: HOME | End: 2023-07-05
Payer: MEDICARE

## 2023-07-05 DIAGNOSIS — Z47.89 ORTHOPEDIC AFTERCARE: ICD-10-CM

## 2023-07-05 PROCEDURE — 73700 CT LOWER EXTREMITY W/O DYE: CPT

## 2023-07-11 ENCOUNTER — TELEPHONE (OUTPATIENT)
Dept: ORTHOPEDIC SURGERY | Age: 83
End: 2023-07-11

## 2023-07-20 ENCOUNTER — OFFICE VISIT (OUTPATIENT)
Dept: ORTHOPEDIC SURGERY | Age: 83
End: 2023-07-20

## 2023-07-20 VITALS — WEIGHT: 242 LBS | HEIGHT: 70 IN | BODY MASS INDEX: 34.65 KG/M2

## 2023-07-20 DIAGNOSIS — Z98.890 STATUS POST HIP SURGERY: Primary | ICD-10-CM

## 2023-07-20 NOTE — PROGRESS NOTES
FOLLOW UP ORTHOPAEDIC NOTE    The patient follows up today for reevaluation of hip. Patient had undergone a left hip closed reduction percutaneous pinning greater than 3 months ago for left hip femoral neck fracture. He had been still having discomfort towards the groin at that time at 3-month evaluation and so ultimately. The patient states 1-2/10 pain in the left groin/hip but is more so noticed pain in the left knee. He states he has had on again off again years worth of left knee pain. He is accompanied by his wife. He has been using his walker/assist device. He states that his left groin has not been giving him any significant pain over the last 2 weeks or so    PE:  AAOx3  RR  Unlabored breathing  Skin warm and moist  Focused physical examination of the left hip  No pain with seated hip flexion at 90 degrees and flex up to 100 degrees no pain with internal/external rotation roughly 30 degrees each  Neurovascular exam unchanged    Pertinent radiographs/imaging:  CT scan left hip 7/5/2023     IMPRESSION:  Partial interval healing of previously noted left subcapsular femoral neck  fracture. Left femoral orthopedic pins appear intact. No new acute fracture or dislocation. Stable appearing osteonecrosis of the femoral heads bilaterally. MY READ: No gross varus collapse. Hardware in place. No broken hardware. Appreciated is bilateral femoral head osteonecrosis which has no collapse. There is interval healing at the fracture site and bridging throughout also with cortical callus appreciated on the periphery. There is also subtle callus formation at the ends of the screw tips. Diagnosis Orders   1. Status post hip surgery            Assessment and plan: 81 male at roughly 4 months postoperatively with continued interval healing with known, correlating diagnosis of healing left hip femoral neck fracture.   -Time of 16 minutes was spent coordinating and discussing the clinical findings and

## 2023-08-31 ENCOUNTER — OFFICE VISIT (OUTPATIENT)
Dept: ORTHOPEDIC SURGERY | Age: 83
End: 2023-08-31

## 2023-08-31 VITALS — HEIGHT: 70 IN | BODY MASS INDEX: 34.65 KG/M2 | WEIGHT: 242 LBS

## 2023-08-31 DIAGNOSIS — M76.52 PATELLAR TENDINITIS OF LEFT KNEE: Primary | ICD-10-CM

## 2023-08-31 DIAGNOSIS — M25.562 LEFT KNEE PAIN, UNSPECIFIED CHRONICITY: ICD-10-CM

## 2023-08-31 NOTE — PROGRESS NOTES
FOLLOW UP ORTHOPAEDIC NOTE    The patient follows up today for evaluation of left knee. Patient states previous left total knee arthroplasty roughly 20 years ago. He states that when he has fallen, of which he is still recovering from his left hip surgery for femoral neck fracture, he has had some continued pain in the left knee. This is below the kneecap he states. He is accompanied by his wife. He states 8/10 pain at its worst.  He uses a walker at baseline. Patient feels he continues to recover from his left hip and is improving with therapy. He is accompanied by his wife. PE:  AAOx3  RR  Unlabored breathing  Skin warm and moist  Focused physical examination of the left knee  No erythema, no warmth  0/128/0 stable to varus and valgus at 0 and 30 degrees. Positive tenderness palpation over the patellar tendon origin. Able to do straight leg raise without pain. Skin intact throughout  5/5 IP Q H TA G EHL  SILT DP SP LP MP S S  +2 DP pulse    Pertinent radiographs/imaging:  3 view left knee 8/31/2023: Negative fracture acute-medial femoral condyle just above the implant shows sclerosis suspected of previous injury with healing. Aligned patella. Satisfactory implant placement     Diagnosis Orders   1. Patellar tendinitis of left knee  diclofenac sodium (VOLTAREN) 1 % GEL    External Referral To Physical Therapy    [employer] Hinged Knee WrapAround Brace      2. Left knee pain, unspecified chronicity  XR KNEE LEFT (3 VIEWS)          Assessment and plan: 81 male with continued subjective symptoms of left knee pain with known, correlating diagnosis of left knee patellar tendinitis. -Time of 16 minutes was spent coordinating and discussing the clinical findings and diagnostic imaging results as they pertain to the patient's presenting subjective symptoms.  -I had a pleasant discussion with the patient today. I reviewed with him that currently his clinical examination correlates with above listed.   I

## 2023-11-29 ENCOUNTER — OFFICE VISIT (OUTPATIENT)
Dept: PULMONOLOGY | Age: 83
End: 2023-11-29
Payer: MEDICARE

## 2023-11-29 VITALS
OXYGEN SATURATION: 98 % | HEART RATE: 114 BPM | BODY MASS INDEX: 34.79 KG/M2 | TEMPERATURE: 97.6 F | HEIGHT: 70 IN | WEIGHT: 243 LBS | SYSTOLIC BLOOD PRESSURE: 145 MMHG | DIASTOLIC BLOOD PRESSURE: 74 MMHG | RESPIRATION RATE: 18 BRPM

## 2023-11-29 DIAGNOSIS — J98.6 PARALYZED HEMIDIAPHRAGM: ICD-10-CM

## 2023-11-29 DIAGNOSIS — K21.9 GASTROESOPHAGEAL REFLUX DISEASE WITHOUT ESOPHAGITIS: ICD-10-CM

## 2023-11-29 DIAGNOSIS — E66.2 CLASS 2 OBESITY WITH ALVEOLAR HYPOVENTILATION AND BODY MASS INDEX (BMI) OF 35.0 TO 35.9 IN ADULT, UNSPECIFIED WHETHER SERIOUS COMORBIDITY PRESENT (HCC): ICD-10-CM

## 2023-11-29 DIAGNOSIS — G47.33 OBSTRUCTIVE SLEEP APNEA: ICD-10-CM

## 2023-11-29 DIAGNOSIS — J84.10 PULMONARY FIBROSIS (HCC): Primary | ICD-10-CM

## 2023-11-29 PROCEDURE — 3077F SYST BP >= 140 MM HG: CPT | Performed by: INTERNAL MEDICINE

## 2023-11-29 PROCEDURE — G8484 FLU IMMUNIZE NO ADMIN: HCPCS | Performed by: INTERNAL MEDICINE

## 2023-11-29 PROCEDURE — 1123F ACP DISCUSS/DSCN MKR DOCD: CPT | Performed by: INTERNAL MEDICINE

## 2023-11-29 PROCEDURE — G8417 CALC BMI ABV UP PARAM F/U: HCPCS | Performed by: INTERNAL MEDICINE

## 2023-11-29 PROCEDURE — 3078F DIAST BP <80 MM HG: CPT | Performed by: INTERNAL MEDICINE

## 2023-11-29 PROCEDURE — G8427 DOCREV CUR MEDS BY ELIG CLIN: HCPCS | Performed by: INTERNAL MEDICINE

## 2023-11-29 PROCEDURE — 99214 OFFICE O/P EST MOD 30 MIN: CPT | Performed by: INTERNAL MEDICINE

## 2023-11-29 PROCEDURE — 1036F TOBACCO NON-USER: CPT | Performed by: INTERNAL MEDICINE

## 2023-11-29 NOTE — PROGRESS NOTES
MA Communication:   The following orders are received by verbal communication from Kenneth Blum MD    Orders include:  2 month f/u with Quan Acevedo

## 2023-11-29 NOTE — PROGRESS NOTES
Chanda Khalil (:  1940) is a 81 y.o. male,Established patient, here for evaluation of the following chief complaint(s):  Follow-up (6 month), Pulmonary fibrosis, and Sleep Apnea         ASSESSMENT/PLAN:  1. Pulmonary fibrosis ([address])  2. Paralyzed hemidiaphragm  3. Obstructive sleep apnea  4. Class 2 obesity with alveolar hypoventilation and body mass index (BMI) of 35.0 to 35.9 in adult, unspecified whether serious comorbidity present ([address])  5. Gastroesophageal reflux disease without esophagitis    Paralyzed left diaphragm  Seen by ent but nothing wrong  PFt did not show flow volume loop obstructions  Has had problems with sob for sometime    Has been worked by by ENT, Cardiology    Had bronch which showed no problems    Has tried inhalers and still not helpiing    Has + sniff test for paralyzed Left diaphragm    Tried pulm rehab and didn't help   Spirometry shows moderate restrictive defect   Stridor is resolved          Went to [facility] for evaluation of paralyzed left diaphragm and had surgery 2016  For pulling down the diaphragm    cxr done 21    LUNGS/PLEURA:  Chronic diffuse interstitial pulmonary fibrosis. No acute interval change. IMPRESSION:   1. Chronic diffuse interstitial pulmonary fibrosis. 2.  No acute interval change.    SIGNED BY: Marcelina Galeazzi, MD on 2021  4:39 PM         Will need to continue and add more exercise          As tried, Isadora Rivera, advair, symbicort, sprivia, breo, anoro and nothing has helped with the breathing      Pulmonary fibrosis  Continue with:  Esbriet 801mg three times a day (still taking 3 tabs TID)  Will need to check liver function at least on a yearly basis  Last done 3/27/2023- normal  Oxygen at 2 lpm    [address] for esbriet is up and will need figure out how to get this  Will back off to BID for now  Will see if we can get generic or will go to [facility]    GERD  Continue with  Omeprazole can increase to twice a day if

## 2024-02-05 ENCOUNTER — APPOINTMENT (OUTPATIENT)
Dept: GENERAL RADIOLOGY | Age: 84
DRG: 382 | End: 2024-02-05
Payer: MEDICARE

## 2024-02-05 ENCOUNTER — HOSPITAL ENCOUNTER (INPATIENT)
Age: 84
LOS: 2 days | Discharge: HOME | DRG: 382 | End: 2024-02-08
Attending: EMERGENCY MEDICINE | Admitting: INTERNAL MEDICINE
Payer: MEDICARE

## 2024-02-05 ENCOUNTER — APPOINTMENT (OUTPATIENT)
Dept: CT IMAGING | Age: 84
DRG: 382 | End: 2024-02-05
Payer: MEDICARE

## 2024-02-05 ENCOUNTER — OFFICE VISIT (OUTPATIENT)
Dept: PULMONOLOGY | Age: 84
End: 2024-02-05
Payer: MEDICARE

## 2024-02-05 VITALS
WEIGHT: 238 LBS | TEMPERATURE: 96.9 F | HEIGHT: 70 IN | SYSTOLIC BLOOD PRESSURE: 142 MMHG | OXYGEN SATURATION: 99 % | HEART RATE: 114 BPM | RESPIRATION RATE: 18 BRPM | DIASTOLIC BLOOD PRESSURE: 74 MMHG | BODY MASS INDEX: 34.07 KG/M2

## 2024-02-05 DIAGNOSIS — G47.33 OSA TREATED WITH BIPAP: ICD-10-CM

## 2024-02-05 DIAGNOSIS — E66.01 CLASS 2 SEVERE OBESITY DUE TO EXCESS CALORIES WITH SERIOUS COMORBIDITY AND BODY MASS INDEX (BMI) OF 35.0 TO 35.9 IN ADULT (HCC): ICD-10-CM

## 2024-02-05 DIAGNOSIS — I10 PRIMARY HYPERTENSION: Chronic | ICD-10-CM

## 2024-02-05 DIAGNOSIS — R10.13 ABDOMINAL PAIN, EPIGASTRIC: ICD-10-CM

## 2024-02-05 DIAGNOSIS — J84.10 PULMONARY FIBROSIS (HCC): Primary | Chronic | ICD-10-CM

## 2024-02-05 DIAGNOSIS — R07.9 CHEST PAIN, UNSPECIFIED TYPE: Primary | ICD-10-CM

## 2024-02-05 DIAGNOSIS — K21.9 GASTROESOPHAGEAL REFLUX DISEASE WITHOUT ESOPHAGITIS: ICD-10-CM

## 2024-02-05 DIAGNOSIS — Z87.891 FORMER SMOKER, STOPPED SMOKING IN DISTANT PAST: ICD-10-CM

## 2024-02-05 DIAGNOSIS — R07.9 CHEST PAIN, UNSPECIFIED TYPE: ICD-10-CM

## 2024-02-05 LAB
ALBUMIN SERPL-MCNC: 3.7 G/DL (ref 3.4–5)
ALBUMIN/GLOB SERPL: 1 {RATIO} (ref 1.1–2.2)
ALP SERPL-CCNC: 108 U/L (ref 40–129)
ALT SERPL-CCNC: 7 U/L (ref 10–40)
ANION GAP SERPL CALCULATED.3IONS-SCNC: 10 MMOL/L (ref 3–16)
APTT BLD: 40.4 SEC (ref 22.7–35.9)
AST SERPL-CCNC: 15 U/L (ref 15–37)
BASOPHILS # BLD: 0 K/UL (ref 0–0.2)
BASOPHILS NFR BLD: 0.4 %
BILIRUB SERPL-MCNC: 0.3 MG/DL (ref 0–1)
BUN SERPL-MCNC: 28 MG/DL (ref 7–20)
CALCIUM SERPL-MCNC: 10.3 MG/DL (ref 8.3–10.6)
CHLORIDE SERPL-SCNC: 97 MMOL/L (ref 99–110)
CO2 SERPL-SCNC: 29 MMOL/L (ref 21–32)
CREAT SERPL-MCNC: 1 MG/DL (ref 0.8–1.3)
DEPRECATED RDW RBC AUTO: 13.1 % (ref 12.4–15.4)
EOSINOPHIL # BLD: 0.3 K/UL (ref 0–0.6)
EOSINOPHIL NFR BLD: 3.1 %
FLUAV RNA RESP QL NAA+PROBE: NOT DETECTED
FLUBV RNA RESP QL NAA+PROBE: NOT DETECTED
GFR SERPLBLD CREATININE-BSD FMLA CKD-EPI: >60 ML/MIN/{1.73_M2}
GLUCOSE SERPL-MCNC: 175 MG/DL (ref 70–99)
HCT VFR BLD AUTO: 35.8 % (ref 40.5–52.5)
HGB BLD-MCNC: 11.7 G/DL (ref 13.5–17.5)
LIPASE SERPL-CCNC: 17 U/L (ref 13–60)
LYMPHOCYTES # BLD: 2.9 K/UL (ref 1–5.1)
LYMPHOCYTES NFR BLD: 26.2 %
MCH RBC QN AUTO: 30.6 PG (ref 26–34)
MCHC RBC AUTO-ENTMCNC: 32.8 G/DL (ref 31–36)
MCV RBC AUTO: 93.2 FL (ref 80–100)
MONOCYTES # BLD: 0.7 K/UL (ref 0–1.3)
MONOCYTES NFR BLD: 6.1 %
NEUTROPHILS # BLD: 7.2 K/UL (ref 1.7–7.7)
NEUTROPHILS NFR BLD: 64.2 %
PLATELET # BLD AUTO: 240 K/UL (ref 135–450)
PMV BLD AUTO: 6.5 FL (ref 5–10.5)
POTASSIUM SERPL-SCNC: 4.2 MMOL/L (ref 3.5–5.1)
PROT SERPL-MCNC: 7.3 G/DL (ref 6.4–8.2)
RBC # BLD AUTO: 3.84 M/UL (ref 4.2–5.9)
SARS-COV-2 RNA RESP QL NAA+PROBE: NOT DETECTED
SODIUM SERPL-SCNC: 136 MMOL/L (ref 136–145)
TROPONIN, HIGH SENSITIVITY: 11 NG/L (ref 0–22)
TROPONIN, HIGH SENSITIVITY: 9 NG/L (ref 0–22)
WBC # BLD AUTO: 11.2 K/UL (ref 4–11)

## 2024-02-05 PROCEDURE — 1036F TOBACCO NON-USER: CPT | Performed by: NURSE PRACTITIONER

## 2024-02-05 PROCEDURE — 1123F ACP DISCUSS/DSCN MKR DOCD: CPT | Performed by: NURSE PRACTITIONER

## 2024-02-05 PROCEDURE — 93005 ELECTROCARDIOGRAM TRACING: CPT | Performed by: PHYSICIAN ASSISTANT

## 2024-02-05 PROCEDURE — 96375 TX/PRO/DX INJ NEW DRUG ADDON: CPT

## 2024-02-05 PROCEDURE — 74174 CTA ABD&PLVS W/CONTRAST: CPT

## 2024-02-05 PROCEDURE — 3078F DIAST BP <80 MM HG: CPT | Performed by: NURSE PRACTITIONER

## 2024-02-05 PROCEDURE — 83690 ASSAY OF LIPASE: CPT

## 2024-02-05 PROCEDURE — G8427 DOCREV CUR MEDS BY ELIG CLIN: HCPCS | Performed by: NURSE PRACTITIONER

## 2024-02-05 PROCEDURE — G8484 FLU IMMUNIZE NO ADMIN: HCPCS | Performed by: NURSE PRACTITIONER

## 2024-02-05 PROCEDURE — 71045 X-RAY EXAM CHEST 1 VIEW: CPT

## 2024-02-05 PROCEDURE — 80053 COMPREHEN METABOLIC PANEL: CPT

## 2024-02-05 PROCEDURE — 84484 ASSAY OF TROPONIN QUANT: CPT

## 2024-02-05 PROCEDURE — G8417 CALC BMI ABV UP PARAM F/U: HCPCS | Performed by: NURSE PRACTITIONER

## 2024-02-05 PROCEDURE — 6370000000 HC RX 637 (ALT 250 FOR IP): Performed by: PHYSICIAN ASSISTANT

## 2024-02-05 PROCEDURE — 85025 COMPLETE CBC W/AUTO DIFF WBC: CPT

## 2024-02-05 PROCEDURE — 6360000004 HC RX CONTRAST MEDICATION: Performed by: EMERGENCY MEDICINE

## 2024-02-05 PROCEDURE — 2500000003 HC RX 250 WO HCPCS: Performed by: PHYSICIAN ASSISTANT

## 2024-02-05 PROCEDURE — 96365 THER/PROPH/DIAG IV INF INIT: CPT

## 2024-02-05 PROCEDURE — 94761 N-INVAS EAR/PLS OXIMETRY MLT: CPT

## 2024-02-05 PROCEDURE — 99215 OFFICE O/P EST HI 40 MIN: CPT | Performed by: NURSE PRACTITIONER

## 2024-02-05 PROCEDURE — 87636 SARSCOV2 & INF A&B AMP PRB: CPT

## 2024-02-05 PROCEDURE — 3077F SYST BP >= 140 MM HG: CPT | Performed by: NURSE PRACTITIONER

## 2024-02-05 PROCEDURE — 2700000000 HC OXYGEN THERAPY PER DAY

## 2024-02-05 PROCEDURE — 85730 THROMBOPLASTIN TIME PARTIAL: CPT

## 2024-02-05 PROCEDURE — G0378 HOSPITAL OBSERVATION PER HR: HCPCS

## 2024-02-05 PROCEDURE — 6360000002 HC RX W HCPCS: Performed by: PHYSICIAN ASSISTANT

## 2024-02-05 PROCEDURE — 96374 THER/PROPH/DIAG INJ IV PUSH: CPT

## 2024-02-05 PROCEDURE — 99285 EMERGENCY DEPT VISIT HI MDM: CPT

## 2024-02-05 RX ORDER — HEPARIN SODIUM 10000 [USP'U]/100ML
1000 INJECTION, SOLUTION INTRAVENOUS CONTINUOUS
Status: DISCONTINUED | OUTPATIENT
Start: 2024-02-05 | End: 2024-02-06

## 2024-02-05 RX ORDER — IPRATROPIUM BROMIDE AND ALBUTEROL SULFATE 2.5; .5 MG/3ML; MG/3ML
1 SOLUTION RESPIRATORY (INHALATION) ONCE
Status: COMPLETED | OUTPATIENT
Start: 2024-02-05 | End: 2024-02-05

## 2024-02-05 RX ORDER — HEPARIN SODIUM 1000 [USP'U]/ML
4000 INJECTION, SOLUTION INTRAVENOUS; SUBCUTANEOUS ONCE
Status: COMPLETED | OUTPATIENT
Start: 2024-02-05 | End: 2024-02-05

## 2024-02-05 RX ORDER — TRAZODONE HYDROCHLORIDE 50 MG/1
TABLET ORAL
COMMUNITY
Start: 2024-01-22

## 2024-02-05 RX ORDER — METHYLPREDNISOLONE SODIUM SUCCINATE 125 MG/2ML
125 INJECTION, POWDER, LYOPHILIZED, FOR SOLUTION INTRAMUSCULAR; INTRAVENOUS ONCE
Status: COMPLETED | OUTPATIENT
Start: 2024-02-05 | End: 2024-02-05

## 2024-02-05 RX ORDER — HEPARIN SODIUM 1000 [USP'U]/ML
4000 INJECTION, SOLUTION INTRAVENOUS; SUBCUTANEOUS PRN
Status: DISCONTINUED | OUTPATIENT
Start: 2024-02-05 | End: 2024-02-06

## 2024-02-05 RX ORDER — FAMOTIDINE 10 MG/ML
20 INJECTION, SOLUTION INTRAVENOUS ONCE
Status: COMPLETED | OUTPATIENT
Start: 2024-02-05 | End: 2024-02-05

## 2024-02-05 RX ORDER — PIOGLITAZONEHYDROCHLORIDE 45 MG/1
TABLET ORAL
COMMUNITY
Start: 2024-01-19

## 2024-02-05 RX ORDER — TRAMADOL HYDROCHLORIDE 50 MG/1
TABLET ORAL
Status: ON HOLD | COMMUNITY
Start: 2024-01-29 | End: 2024-02-08 | Stop reason: HOSPADM

## 2024-02-05 RX ORDER — HEPARIN SODIUM 1000 [USP'U]/ML
2000 INJECTION, SOLUTION INTRAVENOUS; SUBCUTANEOUS PRN
Status: DISCONTINUED | OUTPATIENT
Start: 2024-02-05 | End: 2024-02-06

## 2024-02-05 RX ORDER — ALENDRONATE SODIUM 70 MG/1
TABLET ORAL
COMMUNITY
Start: 2024-02-02

## 2024-02-05 RX ADMIN — FAMOTIDINE 20 MG: 10 INJECTION, SOLUTION INTRAVENOUS at 20:03

## 2024-02-05 RX ADMIN — IPRATROPIUM BROMIDE AND ALBUTEROL SULFATE 1 DOSE: 2.5; .5 SOLUTION RESPIRATORY (INHALATION) at 16:23

## 2024-02-05 RX ADMIN — METHYLPREDNISOLONE SODIUM SUCCINATE 125 MG: 125 INJECTION INTRAMUSCULAR; INTRAVENOUS at 16:25

## 2024-02-05 RX ADMIN — HEPARIN SODIUM AND DEXTROSE 1000 UNITS/HR: 10000; 5 INJECTION INTRAVENOUS at 23:49

## 2024-02-05 RX ADMIN — ALUMINUM HYDROXIDE, MAGNESIUM HYDROXIDE, AND SIMETHICONE: 200; 200; 20 SUSPENSION ORAL at 20:02

## 2024-02-05 RX ADMIN — HEPARIN SODIUM 4000 UNITS: 1000 INJECTION INTRAVENOUS; SUBCUTANEOUS at 23:46

## 2024-02-05 RX ADMIN — IOPAMIDOL 75 ML: 755 INJECTION, SOLUTION INTRAVENOUS at 20:19

## 2024-02-05 ASSESSMENT — SLEEP AND FATIGUE QUESTIONNAIRES
HOW LIKELY ARE YOU TO NOD OFF OR FALL ASLEEP WHILE SITTING AND TALKING TO SOMEONE: 0
HOW LIKELY ARE YOU TO NOD OFF OR FALL ASLEEP IN A CAR, WHILE STOPPED FOR A FEW MINUTES IN TRAFFIC: 0
HOW LIKELY ARE YOU TO NOD OFF OR FALL ASLEEP WHILE LYING DOWN TO REST IN THE AFTERNOON WHEN CIRCUMSTANCES PERMIT: 0
HOW LIKELY ARE YOU TO NOD OFF OR FALL ASLEEP WHILE SITTING QUIETLY AFTER LUNCH WITHOUT ALCOHOL: 0
HOW LIKELY ARE YOU TO NOD OFF OR FALL ASLEEP WHILE WATCHING TV: 2
HOW LIKELY ARE YOU TO NOD OFF OR FALL ASLEEP WHILE SITTING AND READING: 1
ESS TOTAL SCORE: 3
HOW LIKELY ARE YOU TO NOD OFF OR FALL ASLEEP WHEN YOU ARE A PASSENGER IN A CAR FOR AN HOUR WITHOUT A BREAK: 0
HOW LIKELY ARE YOU TO NOD OFF OR FALL ASLEEP WHILE SITTING INACTIVE IN A PUBLIC PLACE: 0

## 2024-02-05 ASSESSMENT — ENCOUNTER SYMPTOMS
ABDOMINAL PAIN: 0
SHORTNESS OF BREATH: 1
SORE THROAT: 0
VOMITING: 1
DIARRHEA: 0
COUGH: 0
NAUSEA: 0
EYE PAIN: 0
CHEST TIGHTNESS: 0
WHEEZING: 0
RHINORRHEA: 0

## 2024-02-05 ASSESSMENT — LIFESTYLE VARIABLES
HOW MANY STANDARD DRINKS CONTAINING ALCOHOL DO YOU HAVE ON A TYPICAL DAY: PATIENT DOES NOT DRINK
HOW OFTEN DO YOU HAVE A DRINK CONTAINING ALCOHOL: NEVER

## 2024-02-05 ASSESSMENT — PAIN SCALES - GENERAL: PAINLEVEL_OUTOF10: 7

## 2024-02-05 ASSESSMENT — PAIN - FUNCTIONAL ASSESSMENT: PAIN_FUNCTIONAL_ASSESSMENT: 0-10

## 2024-02-05 NOTE — PROGRESS NOTES
William J Hohweiler is a 83 y.o. who comes in today for Follow-up and Pulmonary fibrosis   He continues Esbriet for his pulmonary fibrosis along with oxygen 2 L nc. States he has only been taking Esbrient twice a day due to running out of pills.  He also has been wearing his Bipap nightly with oxygen . Denies HA, ear popping or belching with PAP use.  He is no longer taking Prilosec or Nexium for his  GERD. GERD worse  however since having pizza last night. States he is having mid-sternal CP, non radiating, rates 7/10.   Not relieved with Tums.  Also vomited last night.    He is a former heavy smoker, quit over 20 years ago.  He has a hx of paralyzed diaphragm and had surgery for this at [facility] in 2016. States his breathing worse with him having pain.  He is here with his wife today.          Past Medical History:   Diagnosis Date    AAA (abdominal aortic aneurysm) (HCC)     Diabetes mellitus (HCC)     Hyperlipidemia     Hypertension     Pulmonary embolus (HCC)     Pulmonary fibrosis (HCC)         Past Surgical History:   Procedure Laterality Date    CATARACT REMOVAL WITH IMPLANT      bilateral eyes    CERVICAL DISCECTOMY      and fusion (anterior)    CYST REMOVAL      from the buttocks    FEMORAL BYPASS      bi-femoral bypass    HERNIA REPAIR      x 2    HIP SURGERY Left 3/28/2023    LEFT HIP PINNING performed by Gorge Packer MD at [facility] OR    JOINT REPLACEMENT      bilateral knee replacement    NASAL SEPTUM SURGERY      SHOULDER SURGERY      left    TESTICLE REMOVAL      left    TOE SURGERY          History reviewed. No pertinent family history.     Allergies   Allergen Reactions    Azithromycin Itching       Current Outpatient Medications   Medication Sig Dispense Refill    alendronate (FOSAMAX) 70 MG tablet       pioglitazone (ACTOS) 45 MG tablet       ipratropium (ATROVENT HFA) 17 MCG/ACT inhaler Inhale 2 puffs into the lungs      traMADol (ULTRAM) 50 MG tablet       traZODone (DESYREL) 50 MG

## 2024-02-05 NOTE — ED NOTES
RN brought pt back to room 28. Pt had his own portable oxygen tank that was turned off stating that he ran out. Pt was placed back on 2 L of oxygen and 100% on 2L.

## 2024-02-06 ENCOUNTER — APPOINTMENT (OUTPATIENT)
Dept: NUCLEAR MEDICINE | Age: 84
DRG: 382 | End: 2024-02-06
Payer: MEDICARE

## 2024-02-06 PROBLEM — R07.9 CHEST PAIN: Status: ACTIVE | Noted: 2024-02-06

## 2024-02-06 LAB
ANION GAP SERPL CALCULATED.3IONS-SCNC: 12 MMOL/L (ref 3–16)
APTT BLD: 92.7 SEC (ref 22.7–35.9)
BUN SERPL-MCNC: 24 MG/DL (ref 7–20)
CALCIUM SERPL-MCNC: 9.4 MG/DL (ref 8.3–10.6)
CHLORIDE SERPL-SCNC: 100 MMOL/L (ref 99–110)
CHOLEST SERPL-MCNC: 135 MG/DL (ref 0–199)
CO2 SERPL-SCNC: 28 MMOL/L (ref 21–32)
CREAT SERPL-MCNC: 1 MG/DL (ref 0.8–1.3)
DEPRECATED RDW RBC AUTO: 12.8 % (ref 12.4–15.4)
EKG ATRIAL RATE: 110 BPM
EKG DIAGNOSIS: NORMAL
EKG P AXIS: 40 DEGREES
EKG P-R INTERVAL: 198 MS
EKG Q-T INTERVAL: 310 MS
EKG QRS DURATION: 84 MS
EKG QTC CALCULATION (BAZETT): 419 MS
EKG R AXIS: 20 DEGREES
EKG T AXIS: 35 DEGREES
EKG VENTRICULAR RATE: 110 BPM
EST. AVERAGE GLUCOSE BLD GHB EST-MCNC: 134.1 MG/DL
GFR SERPLBLD CREATININE-BSD FMLA CKD-EPI: >60 ML/MIN/{1.73_M2}
GLUCOSE BLD-MCNC: 108 MG/DL (ref 70–99)
GLUCOSE BLD-MCNC: 122 MG/DL (ref 70–99)
GLUCOSE BLD-MCNC: 126 MG/DL (ref 70–99)
GLUCOSE BLD-MCNC: 129 MG/DL (ref 70–99)
GLUCOSE BLD-MCNC: 136 MG/DL (ref 70–99)
GLUCOSE BLD-MCNC: 203 MG/DL (ref 70–99)
GLUCOSE BLD-MCNC: 53 MG/DL (ref 70–99)
GLUCOSE BLD-MCNC: 78 MG/DL (ref 70–99)
GLUCOSE BLD-MCNC: 87 MG/DL (ref 70–99)
GLUCOSE SERPL-MCNC: 132 MG/DL (ref 70–99)
HBA1C MFR BLD: 6.3 %
HCT VFR BLD AUTO: 32.7 % (ref 40.5–52.5)
HDLC SERPL-MCNC: 51 MG/DL (ref 40–60)
HGB BLD-MCNC: 11.2 G/DL (ref 13.5–17.5)
LDLC SERPL CALC-MCNC: 72 MG/DL
MCH RBC QN AUTO: 31.7 PG (ref 26–34)
MCHC RBC AUTO-ENTMCNC: 34.4 G/DL (ref 31–36)
MCV RBC AUTO: 92.1 FL (ref 80–100)
PERFORMED ON: ABNORMAL
PERFORMED ON: NORMAL
PERFORMED ON: NORMAL
PLATELET # BLD AUTO: 205 K/UL (ref 135–450)
PMV BLD AUTO: 6.9 FL (ref 5–10.5)
POTASSIUM SERPL-SCNC: 4.6 MMOL/L (ref 3.5–5.1)
RBC # BLD AUTO: 3.55 M/UL (ref 4.2–5.9)
SODIUM SERPL-SCNC: 140 MMOL/L (ref 136–145)
TRIGL SERPL-MCNC: 60 MG/DL (ref 0–150)
TROPONIN, HIGH SENSITIVITY: 9 NG/L (ref 0–22)
VLDLC SERPL CALC-MCNC: 12 MG/DL
WBC # BLD AUTO: 7.1 K/UL (ref 4–11)

## 2024-02-06 PROCEDURE — 93010 ELECTROCARDIOGRAM REPORT: CPT | Performed by: INTERNAL MEDICINE

## 2024-02-06 PROCEDURE — 99222 1ST HOSP IP/OBS MODERATE 55: CPT | Performed by: INTERNAL MEDICINE

## 2024-02-06 PROCEDURE — 36415 COLL VENOUS BLD VENIPUNCTURE: CPT

## 2024-02-06 PROCEDURE — 6370000000 HC RX 637 (ALT 250 FOR IP): Performed by: NURSE PRACTITIONER

## 2024-02-06 PROCEDURE — 6360000004 HC RX CONTRAST MEDICATION: Performed by: NURSE PRACTITIONER

## 2024-02-06 PROCEDURE — 80048 BASIC METABOLIC PNL TOTAL CA: CPT

## 2024-02-06 PROCEDURE — 2700000000 HC OXYGEN THERAPY PER DAY

## 2024-02-06 PROCEDURE — C8929 TTE W OR WO FOL WCON,DOPPLER: HCPCS

## 2024-02-06 PROCEDURE — 96366 THER/PROPH/DIAG IV INF ADDON: CPT

## 2024-02-06 PROCEDURE — G0378 HOSPITAL OBSERVATION PER HR: HCPCS

## 2024-02-06 PROCEDURE — 84484 ASSAY OF TROPONIN QUANT: CPT

## 2024-02-06 PROCEDURE — 1200000000 HC SEMI PRIVATE

## 2024-02-06 PROCEDURE — 83036 HEMOGLOBIN GLYCOSYLATED A1C: CPT

## 2024-02-06 PROCEDURE — 2580000003 HC RX 258: Performed by: NURSE PRACTITIONER

## 2024-02-06 PROCEDURE — A9502 TC99M TETROFOSMIN: HCPCS | Performed by: NURSE PRACTITIONER

## 2024-02-06 PROCEDURE — 78452 HT MUSCLE IMAGE SPECT MULT: CPT

## 2024-02-06 PROCEDURE — 93017 CV STRESS TEST TRACING ONLY: CPT

## 2024-02-06 PROCEDURE — 94761 N-INVAS EAR/PLS OXIMETRY MLT: CPT

## 2024-02-06 PROCEDURE — 3430000000 HC RX DIAGNOSTIC RADIOPHARMACEUTICAL: Performed by: NURSE PRACTITIONER

## 2024-02-06 PROCEDURE — 80061 LIPID PANEL: CPT

## 2024-02-06 PROCEDURE — 85027 COMPLETE CBC AUTOMATED: CPT

## 2024-02-06 PROCEDURE — 99222 1ST HOSP IP/OBS MODERATE 55: CPT | Performed by: SURGERY

## 2024-02-06 PROCEDURE — 85730 THROMBOPLASTIN TIME PARTIAL: CPT

## 2024-02-06 PROCEDURE — 6360000002 HC RX W HCPCS: Performed by: NURSE PRACTITIONER

## 2024-02-06 PROCEDURE — 96375 TX/PRO/DX INJ NEW DRUG ADDON: CPT

## 2024-02-06 PROCEDURE — 2500000003 HC RX 250 WO HCPCS: Performed by: INTERNAL MEDICINE

## 2024-02-06 RX ORDER — NORTRIPTYLINE HYDROCHLORIDE 10 MG/1
10 CAPSULE ORAL 2 TIMES DAILY
Status: DISCONTINUED | OUTPATIENT
Start: 2024-02-06 | End: 2024-02-08 | Stop reason: HOSPADM

## 2024-02-06 RX ORDER — ACETAMINOPHEN 325 MG/1
650 TABLET ORAL EVERY 6 HOURS
Status: DISCONTINUED | OUTPATIENT
Start: 2024-02-06 | End: 2024-02-08 | Stop reason: HOSPADM

## 2024-02-06 RX ORDER — INSULIN LISPRO 100 [IU]/ML
0-8 INJECTION, SOLUTION INTRAVENOUS; SUBCUTANEOUS
Status: DISCONTINUED | OUTPATIENT
Start: 2024-02-06 | End: 2024-02-08 | Stop reason: HOSPADM

## 2024-02-06 RX ORDER — TRAZODONE HYDROCHLORIDE 50 MG/1
50 TABLET ORAL NIGHTLY
Status: DISCONTINUED | OUTPATIENT
Start: 2024-02-06 | End: 2024-02-08 | Stop reason: HOSPADM

## 2024-02-06 RX ORDER — ISOSORBIDE MONONITRATE 30 MG/1
30 TABLET, EXTENDED RELEASE ORAL DAILY
Status: DISCONTINUED | OUTPATIENT
Start: 2024-02-06 | End: 2024-02-08 | Stop reason: HOSPADM

## 2024-02-06 RX ORDER — GABAPENTIN 300 MG/1
300 CAPSULE ORAL EVERY EVENING
Status: DISCONTINUED | OUTPATIENT
Start: 2024-02-06 | End: 2024-02-08 | Stop reason: HOSPADM

## 2024-02-06 RX ORDER — POTASSIUM CHLORIDE 20 MEQ/1
40 TABLET, EXTENDED RELEASE ORAL PRN
Status: DISCONTINUED | OUTPATIENT
Start: 2024-02-06 | End: 2024-02-08 | Stop reason: HOSPADM

## 2024-02-06 RX ORDER — SODIUM CHLORIDE 9 MG/ML
INJECTION, SOLUTION INTRAVENOUS CONTINUOUS
Status: ACTIVE | OUTPATIENT
Start: 2024-02-06 | End: 2024-02-06

## 2024-02-06 RX ORDER — POLYETHYLENE GLYCOL 3350 17 G/17G
17 POWDER, FOR SOLUTION ORAL DAILY PRN
Status: DISCONTINUED | OUTPATIENT
Start: 2024-02-06 | End: 2024-02-08 | Stop reason: HOSPADM

## 2024-02-06 RX ORDER — SODIUM CHLORIDE 0.9 % (FLUSH) 0.9 %
5-40 SYRINGE (ML) INJECTION EVERY 12 HOURS SCHEDULED
Status: DISCONTINUED | OUTPATIENT
Start: 2024-02-06 | End: 2024-02-08 | Stop reason: HOSPADM

## 2024-02-06 RX ORDER — ONDANSETRON 4 MG/1
4 TABLET, ORALLY DISINTEGRATING ORAL EVERY 8 HOURS PRN
Status: DISCONTINUED | OUTPATIENT
Start: 2024-02-06 | End: 2024-02-08 | Stop reason: HOSPADM

## 2024-02-06 RX ORDER — DEXTROSE MONOHYDRATE 100 MG/ML
INJECTION, SOLUTION INTRAVENOUS CONTINUOUS PRN
Status: DISCONTINUED | OUTPATIENT
Start: 2024-02-06 | End: 2024-02-08 | Stop reason: HOSPADM

## 2024-02-06 RX ORDER — GLUCAGON 1 MG/ML
1 KIT INJECTION PRN
Status: DISCONTINUED | OUTPATIENT
Start: 2024-02-06 | End: 2024-02-08 | Stop reason: HOSPADM

## 2024-02-06 RX ORDER — ACETAMINOPHEN 325 MG/1
650 TABLET ORAL EVERY 6 HOURS PRN
Status: DISCONTINUED | OUTPATIENT
Start: 2024-02-06 | End: 2024-02-08 | Stop reason: HOSPADM

## 2024-02-06 RX ORDER — ACETAMINOPHEN 650 MG/1
650 SUPPOSITORY RECTAL EVERY 6 HOURS PRN
Status: DISCONTINUED | OUTPATIENT
Start: 2024-02-06 | End: 2024-02-08 | Stop reason: HOSPADM

## 2024-02-06 RX ORDER — ASPIRIN 81 MG/1
81 TABLET, CHEWABLE ORAL DAILY
Status: DISCONTINUED | OUTPATIENT
Start: 2024-02-06 | End: 2024-02-08 | Stop reason: HOSPADM

## 2024-02-06 RX ORDER — SODIUM CHLORIDE 0.9 % (FLUSH) 0.9 %
5-40 SYRINGE (ML) INJECTION PRN
Status: DISCONTINUED | OUTPATIENT
Start: 2024-02-06 | End: 2024-02-08 | Stop reason: HOSPADM

## 2024-02-06 RX ORDER — INSULIN GLARGINE 100 [IU]/ML
24 INJECTION, SOLUTION SUBCUTANEOUS 2 TIMES DAILY
Status: DISCONTINUED | OUTPATIENT
Start: 2024-02-06 | End: 2024-02-07

## 2024-02-06 RX ORDER — SODIUM CHLORIDE 9 MG/ML
INJECTION, SOLUTION INTRAVENOUS PRN
Status: DISCONTINUED | OUTPATIENT
Start: 2024-02-06 | End: 2024-02-08 | Stop reason: HOSPADM

## 2024-02-06 RX ORDER — POTASSIUM CHLORIDE 7.45 MG/ML
10 INJECTION INTRAVENOUS PRN
Status: DISCONTINUED | OUTPATIENT
Start: 2024-02-06 | End: 2024-02-08 | Stop reason: HOSPADM

## 2024-02-06 RX ORDER — ENOXAPARIN SODIUM 100 MG/ML
40 INJECTION SUBCUTANEOUS DAILY
Status: DISCONTINUED | OUTPATIENT
Start: 2024-02-07 | End: 2024-02-07

## 2024-02-06 RX ORDER — REGADENOSON 0.08 MG/ML
0.4 INJECTION, SOLUTION INTRAVENOUS
Status: COMPLETED | OUTPATIENT
Start: 2024-02-06 | End: 2024-02-06

## 2024-02-06 RX ORDER — FUROSEMIDE 40 MG/1
40 TABLET ORAL EVERY MORNING
Status: DISCONTINUED | OUTPATIENT
Start: 2024-02-06 | End: 2024-02-08 | Stop reason: HOSPADM

## 2024-02-06 RX ORDER — INSULIN LISPRO 100 [IU]/ML
0-4 INJECTION, SOLUTION INTRAVENOUS; SUBCUTANEOUS NIGHTLY
Status: DISCONTINUED | OUTPATIENT
Start: 2024-02-06 | End: 2024-02-08 | Stop reason: HOSPADM

## 2024-02-06 RX ORDER — DEXTROSE MONOHYDRATE 25 G/50ML
25 INJECTION, SOLUTION INTRAVENOUS ONCE
Status: COMPLETED | OUTPATIENT
Start: 2024-02-06 | End: 2024-02-06

## 2024-02-06 RX ORDER — AMLODIPINE BESYLATE 5 MG/1
5 TABLET ORAL DAILY
Status: DISCONTINUED | OUTPATIENT
Start: 2024-02-06 | End: 2024-02-08 | Stop reason: HOSPADM

## 2024-02-06 RX ORDER — ATORVASTATIN CALCIUM 40 MG/1
40 TABLET, FILM COATED ORAL NIGHTLY
Status: DISCONTINUED | OUTPATIENT
Start: 2024-02-06 | End: 2024-02-08 | Stop reason: HOSPADM

## 2024-02-06 RX ORDER — MAGNESIUM SULFATE IN WATER 40 MG/ML
2000 INJECTION, SOLUTION INTRAVENOUS PRN
Status: DISCONTINUED | OUTPATIENT
Start: 2024-02-06 | End: 2024-02-08 | Stop reason: HOSPADM

## 2024-02-06 RX ORDER — ONDANSETRON 2 MG/ML
4 INJECTION INTRAMUSCULAR; INTRAVENOUS EVERY 6 HOURS PRN
Status: DISCONTINUED | OUTPATIENT
Start: 2024-02-06 | End: 2024-02-08 | Stop reason: HOSPADM

## 2024-02-06 RX ADMIN — INSULIN GLARGINE 24 UNITS: 100 INJECTION, SOLUTION SUBCUTANEOUS at 02:03

## 2024-02-06 RX ADMIN — TRAZODONE HYDROCHLORIDE 50 MG: 50 TABLET ORAL at 02:03

## 2024-02-06 RX ADMIN — INSULIN GLARGINE 24 UNITS: 100 INJECTION, SOLUTION SUBCUTANEOUS at 21:43

## 2024-02-06 RX ADMIN — ASPIRIN 81 MG 81 MG: 81 TABLET ORAL at 08:18

## 2024-02-06 RX ADMIN — ACETAMINOPHEN 650 MG: 325 TABLET ORAL at 02:03

## 2024-02-06 RX ADMIN — TETROFOSMIN 10.2 MILLICURIE: 1.38 INJECTION, POWDER, LYOPHILIZED, FOR SOLUTION INTRAVENOUS at 09:04

## 2024-02-06 RX ADMIN — NORTRIPTYLINE HYDROCHLORIDE 10 MG: 10 CAPSULE ORAL at 08:18

## 2024-02-06 RX ADMIN — ACETAMINOPHEN 650 MG: 325 TABLET ORAL at 13:42

## 2024-02-06 RX ADMIN — ISOSORBIDE MONONITRATE 30 MG: 30 TABLET, EXTENDED RELEASE ORAL at 08:18

## 2024-02-06 RX ADMIN — PERFLUTREN 1.5 ML: 6.52 INJECTION, SUSPENSION INTRAVENOUS at 14:49

## 2024-02-06 RX ADMIN — TRAZODONE HYDROCHLORIDE 50 MG: 50 TABLET ORAL at 21:36

## 2024-02-06 RX ADMIN — NORTRIPTYLINE HYDROCHLORIDE 10 MG: 10 CAPSULE ORAL at 21:36

## 2024-02-06 RX ADMIN — ATORVASTATIN CALCIUM 40 MG: 40 TABLET, FILM COATED ORAL at 21:36

## 2024-02-06 RX ADMIN — TETROFOSMIN 34 MILLICURIE: 1.38 INJECTION, POWDER, LYOPHILIZED, FOR SOLUTION INTRAVENOUS at 10:42

## 2024-02-06 RX ADMIN — ACETAMINOPHEN 650 MG: 325 TABLET ORAL at 08:18

## 2024-02-06 RX ADMIN — ATORVASTATIN CALCIUM 40 MG: 40 TABLET, FILM COATED ORAL at 02:03

## 2024-02-06 RX ADMIN — REGADENOSON 0.4 MG: 0.08 INJECTION, SOLUTION INTRAVENOUS at 10:42

## 2024-02-06 RX ADMIN — SODIUM CHLORIDE: 9 INJECTION, SOLUTION INTRAVENOUS at 02:40

## 2024-02-06 RX ADMIN — FUROSEMIDE 40 MG: 40 TABLET ORAL at 13:19

## 2024-02-06 RX ADMIN — SODIUM CHLORIDE, PRESERVATIVE FREE 10 ML: 5 INJECTION INTRAVENOUS at 21:36

## 2024-02-06 RX ADMIN — AMLODIPINE BESYLATE 5 MG: 5 TABLET ORAL at 08:18

## 2024-02-06 RX ADMIN — DEXTROSE MONOHYDRATE 25 G: 25 INJECTION, SOLUTION INTRAVENOUS at 13:17

## 2024-02-06 RX ADMIN — INSULIN GLARGINE 24 UNITS: 100 INJECTION, SOLUTION SUBCUTANEOUS at 08:20

## 2024-02-06 RX ADMIN — NORTRIPTYLINE HYDROCHLORIDE 10 MG: 10 CAPSULE ORAL at 02:06

## 2024-02-06 RX ADMIN — GABAPENTIN 300 MG: 300 CAPSULE ORAL at 18:40

## 2024-02-06 ASSESSMENT — PAIN SCALES - GENERAL: PAINLEVEL_OUTOF10: 0

## 2024-02-06 NOTE — CONSULTS
Pharmacy to Manage Heparin Infusion per Hospital Nomogram    Dx: Chest Pain  Pt AdjBW = 87 Kg  Baseline aPTT = pending    Oral factor Xa-inhibitors may alter and elevate anti-Xa levels used for unfractionated heparin monitoring. As a result, anti-Xa monitoring is not accurate while Xa-inhibitor activity is detectable. Utilize aPTT monitoring when patient received an oral factor Xa-inhibitor (apixaban, betrixaban, edoxaban or rivaroxaban) within 72 hours prior to admission (please document last administration time). The goal is to allow a washout of oral factor Xa-inhibitors by using aPTT for 72 hours, then change to ant-Xa levels for UFH.     Heparin (weight-based) Infusion: CAD/STEMI/NSTEMI/UA/AFib)   Heparin 60 units/kg IVP bolus (max 4,000 units) followed by Heparin infusion at 12 units/kg/hr (recommended max initial rate: 1000 units/hr).  Recheck anti-Xa (unless aPTT being used) in 6 hours.  Goal anti-Xa 0.3-0.7 IU/mL  Goal aPTT =  seconds.    Pharmacy to manage Heparin - contact for questions.    Heparin 60 units/kg IV x 1 (max 4,000 units), then 12 units/kg/hr (recommended max initial rate 1,000 units/hr). Adjust infusion rate based off aPTT results below.     aPTT < 59       Heparin 60 units/kg bolus  Increase infusion by 4 units/kg/hr  aPTT 59 - 72.9    Heparin 30 units/kg bolus       Increase infusion by 2 units/kg/hr  aPTT 73 - 102       No bolus       No change   aPTT 102.1 - 109    No bolus       Decrease infusion by 1 units/kg/hr  aPTT 109.1 - 122.9    No bolus       Decrease infusion by 2 units/kg/hr  aPTT 123 or greater   Hold heparin for 1 hour       Decrease infusion by 3 units/kg/hr    Obtain aPTT 6 hours after bolus and 6 hours after any dose change until two consecutive therapeutic aPTT are achieved- then daily.    Ambreen Hernandez, PharmD  2/5/2024 at 10:31 PM      2/6  - aPTT= 92.7sec at5:48am  - No changes needed  - continue infusion at current rate of 1000units/hr per protocol  -  Will recheck aPTT at noon.  Nicolle Cameron/[facility]. 2/6/24

## 2024-02-06 NOTE — PROGRESS NOTES
IV infiltration while giving dextrose 50%.  Called pharmacy and was told to apply heat or ice.  IV removed. Heat applied.

## 2024-02-06 NOTE — CARE COORDINATION
Case Management Assessment  Initial Evaluation    Date/Time of Evaluation: 2/6/2024 2:05 PM  Assessment Completed by: Roxane Burton RN    If patient is discharged prior to next notation, then this note serves as note for discharge by case management.    Patient Name: William J Hohweiler                   YOB: 1940  Diagnosis: Abdominal pain, epigastric [R10.13]  Chest pain [R07.9]  Chest pain, unspecified type [R07.9]                   Date / Time: 2/5/2024  3:56 PM    Patient Admission Status: Observation   Readmission Risk (Low < 19, Mod (19-27), High > 27): Readmission Risk Score: 16.3    Current PCP: Jonah Payne MD  PCP verified by CM? Yes    Chart Reviewed: Yes      History Provided by: Significant Other  Patient Orientation: Alert and Oriented    Patient Cognition: Alert    Hospitalization in the last 30 days (Readmission):  No    If yes, Readmission Assessment in CM Navigator will be completed.    Advance Directives:      Code Status: Full Code   Patient's Primary Decision Maker is: Named in Scanned ACP Document      Discharge Planning:    Patient lives with: Spouse/Significant Other Type of Home: Apartment  Primary Care Giver: Self  Patient Support Systems include: Spouse/Significant Other   Current Financial resources: Medicare  Current community resources: None  Current services prior to admission: Durable Medical Equipment            Current DME: Bipap, Oxygen Therapy (Comment) (2L w/[employer])            Type of Home Care services:  None    ADLS  Prior functional level: Independent in ADLs/IADLs  Current functional level: Independent in ADLs/IADLs    PT AM-PAC:   /24  OT AM-PAC:   /24    Family can provide assistance at DC: Yes  Would you like Case Management to discuss the discharge plan with any other family members/significant others, and if so, who? No  Plans to Return to Present Housing: Yes  Other Identified Issues/Barriers to RETURNING to current housing: NONE at this

## 2024-02-06 NOTE — ED NOTES
Patient ate half of ham sandwich, then began reporting of severe chest pain, states \"it's the worst I've had.\" PA notified.

## 2024-02-06 NOTE — PROGRESS NOTES
A lexiscan myoview stress test was completed on this patient as ordered. The patient tolerated the procedure well.  Awaiting stress imaging at this time.

## 2024-02-06 NOTE — ED PROVIDER NOTES
I independently performed a history and physical on William J Hohweiler.   All diagnostic, treatment, and disposition decisions were made by myself in conjunction with the advanced practice provider.     For further details of William J Hohweiler's emergency department encounter, please see SWAPNA Scott's documentation.      Chief complaint: Chest Pain (Pt reports CP started last night after eating pizza with \"too much damn tomato sauce on it\". Pt reports taking tums without relief. )      Patient complains of lower sternal chest pains.  He states he got worse after eating last night.  If he eats or drinks anything he gets the pain at the lower end of his sternum.  He also reports a slightly different pain that is worse with exertion during the day today which is a new pain for him as well.  He denies any diaphoresis, palpitations, shortness of breath.  Heart regular rate and rhythm.  History From: History from : Patient  Limitations to history : None    EKG  The Ekg interpreted by me shows  sinus tachycardia, qoeg=790    Axis is   Normal  QTc is  normal  Intervals and Durations are unremarkable.      ST Segments: normal  No significant change from prior EKG dated 27 mar 2023  REPEAT EKG@2001  The Ekg interpreted by me shows  sinus tachycardia, jqka=863    Axis is   Normal  QTc is  normal  Frequent PACs noted which I believe is the cause for the computer interpreting it as A-fib  ST Segments: no acute change  No significant change from prior EKG dated earlier today    Labs Reviewed   CBC WITH AUTO DIFFERENTIAL - Abnormal; Notable for the following components:       Result Value    WBC 11.2 (*)     RBC 3.84 (*)     Hemoglobin 11.7 (*)     Hematocrit 35.8 (*)     All other components within normal limits   COMPREHENSIVE METABOLIC PANEL W/ REFLEX TO MG FOR LOW K - Abnormal; Notable for the following components:    Chloride 97 (*)     Glucose 175 (*)     BUN 28 (*)     Albumin/Globulin Ratio 1.0 (*)     ALT 7 (*)

## 2024-02-06 NOTE — H&P
Hospital Medicine History & Physical      Date of Admission: 2/5/2024    Date of Service:  Pt seen/examined on 2/5/2024     []Admitted to Inpatient with expected LOS greater than two midnights due to medical therapy.    [x]Placed in Observation status.    Chief Admission Complaint:  Chest pain    Presenting Admission History:      83 y.o. male, with PMH of obesity, HTN, HLD, DM,and PE who presented to [facility] with chest pain. History obtained from the patient and review of EMR.  Patient stated he began to experience some intermittent midsternal chest pain last night after eating pizza.  He stated there was \"too much damn tomato sauce on it\".  Patient stated he took some Tums without relief, but went to bed anyway.  Patient stated today he began to experience midsternal chest pain again described more as a pressure and burning sensation and worse on exertion.  He denied any radiation of the chest pain.  In the emergency department the patient had a negative troponin x 2 as well as a nonischemic EKG.  He was given a GI cocktail, and Pepcid.  A CTA chest, abdomen and pelvis was obtained that revealedocclusion of the infrarenal distal aorta extending into the bilateral common iliac arteries which may be chronic. Aortobifem graft appears mostly occluded throughout.  Reconstitution of flow within the bilateral external and internal iliac arteries with patent bilateral femoral arteries. Sigmoid diverticulosis without evidence of diverticulitis. Findings compatible with a UIP pattern of chronic interstitial lung disease with pulmonary fibrosis in the right lower lobe.  A superimposed infectious or inflammatory pneumonitis is not entirely excluded.arteries.  Vascular surgery was consulted in the emergency department and the patient was started on a heparin drip.  He was admitted for further evaluation and treatment.The patient denied any other associated symptoms as well as any aggravating and/or alleviating  Apply 4 g topically 4 times daily as needed for Pain 8/31/23   Gorge Packer MD   omeprazole (PRILOSEC) 40 MG delayed release capsule Take 1 capsule by mouth every morning (before breakfast) 6/21/23   Esipnoza Melara MD   acetaminophen (TYLENOL) 325 MG tablet Take 2 tablets by mouth in the morning and 2 tablets at noon and 2 tablets in the evening and 2 tablets before bedtime. 3/29/23   Luana Aly PA   apixaban (ELIQUIS) 2.5 MG TABS tablet Take 1 tablet by mouth 2 times daily 3/29/23   Luana Aly PA   lidocaine 4 % external patch Place 1 patch onto the skin daily 3/30/23   Luana Aly PA   gabapentin (NEURONTIN) 300 MG capsule Take 1 capsule by mouth every evening.    Daniel Doty MD   celecoxib (CELEBREX) 200 MG capsule Take 1 capsule by mouth 2 times daily    Daniel Doty MD   Insulin Glargine (TOUJEO SOLOSTAR SC) Inject 30 Units into the skin in the morning and at bedtime    Daniel Doty MD   Pirfenidone 801 MG TABS Take 1 tablet by mouth 3 times daily (with meals) 12/2/22   Espinoza Melara MD   nortriptyline (PAMELOR) 50 MG capsule  10/26/22   Daniel Doty MD   zolpidem (AMBIEN) 10 MG tablet  9/17/22   Daniel Doty MD   atorvastatin (LIPITOR) 80 MG tablet 0.5 tablets 11/25/22   Daniel Doty MD   amLODIPine (NORVASC) 5 MG tablet TAKE 1 TABLET BY MOUTH EVERY DAY 12/17/15   Juan José Nam MD   isosorbide mononitrate (IMDUR) 30 MG CR tablet TAKE 1 TABLET BY MOUTH EVERY DAY 12/15/15   Juan José Nam MD   Multiple Vitamins-Minerals (THERAPEUTIC MULTIVITAMIN-MINERALS) tablet Take 1 tablet by mouth daily    Daniel Doty MD   furosemide (LASIX) 40 MG tablet Take 1 tablet by mouth every morning    Daniel Doty MD   SITagliptin (JANUVIA) 100 MG tablet Take 1 tablet by mouth daily    Daniel Doty MD   aspirin 81 MG tablet Take 1 tablet by mouth daily    Daniel Doty MD     Labs: Personally

## 2024-02-06 NOTE — CONSULTS
Vascular Surgery Consultation    Date of Admission:  2/5/2024  3:56 PM  Date of Consultation:  2/6/2024    PCP:  Jonah Payne MD       Chief Complaint: Chest pain    History of Present Illness:   We are asked to see this patient in consultation by Cassidy Bonilla  regarding aortic occlusion.   William J Hohweiler is a 83 y.o. male who presented to ED with c/o several days epigastric/chest pain.   He has a past history of AortoBifemoral bypass, ~ 30yrs ago per patient.  He denies any claudication symptoms and no rest pain.  He does report occasional burning in feet which he was told is from neuropathy.    CTA Chest/abd/pelvis was performed in ED and aortbifem graft noted to be occluded.        Past Medical History:  Past Medical History:   Diagnosis Date    AAA (abdominal aortic aneurysm) (HCC)     Diabetes mellitus (HCC)     Hyperlipidemia     Hypertension     Pulmonary embolus (HCC)     Pulmonary fibrosis (HCC)        Past Surgical History:  Past Surgical History:   Procedure Laterality Date    CATARACT REMOVAL WITH IMPLANT      bilateral eyes    CERVICAL DISCECTOMY      and fusion (anterior)    CYST REMOVAL      from the buttocks    FEMORAL BYPASS      bi-femoral bypass    HERNIA REPAIR      x 2    HIP SURGERY Left 3/28/2023    LEFT HIP PINNING performed by Gorge Packer MD at [facility] OR    JOINT REPLACEMENT      bilateral knee replacement    NASAL SEPTUM SURGERY      SHOULDER SURGERY      left    TESTICLE REMOVAL      left    TOE SURGERY         Home Medications:   Prior to Admission medications    Medication Sig Start Date End Date Taking? Authorizing Provider   alendronate (FOSAMAX) 70 MG tablet  2/2/24   Daniel Doty MD   pioglitazone (ACTOS) 45 MG tablet  1/19/24   ProviderDaniel MD   ipratropium (ATROVENT HFA) 17 MCG/ACT inhaler Inhale 2 puffs into the lungs    ProviderDaniel MD   traMADol (ULTRAM) 50 MG tablet  1/29/24   Daniel Doty MD   traZODone  1. PT- bed mobility,transfers, gait and balance training  2. OT- ADL'S  3. CVA-continue w/u as per neuro  4. Patient would benefit from acute rehab, needs a multidisciplinary team including PT, OT and speech, however, pt wishes to go to [facility] closer to home .  Will follow.

## 2024-02-06 NOTE — PROGRESS NOTES
Patient admitted to room 360 from ED.  Patient oriented to room, call light, bed rails, phone, lights and bathroom.  Patient instructed about the schedule of the day including: vital sign frequency, lab draws, possible tests, frequency of MD and staff rounds, including RN/MD rounding together at bedside, daily weights, and I &O's.  Patient instructed about prescribed diet, how to use 8MENU, and television.  bed alarm in place, patient aware of placement and reason.  Telemetry box in place, patient aware of placement and reason.  Bed locked, in lowest position, side rails up 2/4, call light within reach.     No complaints at this time

## 2024-02-06 NOTE — PROGRESS NOTES
[facility] Medicine Progress Note      Date of Admission: 2/5/2024  Hospital Day: 2    Chief Admission Complaint:  chest pain     Subjective:   seen in stress lab, no complaints currently    Presenting Admission History:         83 y.o. male, with PMH of obesity, HTN, HLD, DM,and PE who presented to [facility] with chest pain. History obtained from the patient and review of EMR.  Patient stated he began to experience some intermittent midsternal chest pain last night after eating pizza.  He stated there was \"too much damn tomato sauce on it\".  Patient stated he took some Tums without relief, but went to bed anyway.  Patient stated today he began to experience midsternal chest pain again described more as a pressure and burning sensation and worse on exertion.  He denied any radiation of the chest pain.  In the emergency department the patient had a negative troponin x 2 as well as a nonischemic EKG.  He was given a GI cocktail, and Pepcid.  A CTA chest, abdomen and pelvis was obtained that revealedocclusion of the infrarenal distal aorta extending into the bilateral common iliac arteries which may be chronic. Aortobifem graft appears mostly occluded throughout.  Reconstitution of flow within the bilateral external and internal iliac arteries with patent bilateral femoral arteries. Sigmoid diverticulosis without evidence of diverticulitis. Findings compatible with a UIP pattern of chronic interstitial lung disease with pulmonary fibrosis in the right lower lobe.  A superimposed infectious or inflammatory pneumonitis is not entirely excluded.arteries.  Vascular surgery was consulted in the emergency department and the patient was started on a heparin drip.  He was admitted for further evaluation and treatment.The patient denied any other associated symptoms as well as any aggravating and/or alleviating factors. At the time of this assessment, the patient was resting comfortably in bed. He currently denies any  rhythm.  Abdomen:  Soft, non-tender, non-distended.  Musculoskeletal:  No edema  Neurologic:  Non-focal  Psychiatric:  Alert and oriented    /65   Pulse 87   Temp 97.6 °F (36.4 °C) (Oral)   Resp 18   Ht 1.778 m (5' 10\")   Wt 105.8 kg (233 lb 3.2 oz)   SpO2 98%   BMI 33.46 kg/m²     Diet: ADULT DIET; Regular; 3 carb choices (45 gm/meal)  DVT Prophylaxis: [x]PPx LMWH  []SQ Heparin  []IPC/SCDs  []Eliquis  []Xarelto  []Coumadin  []Other -      Code status: Full Code  PT/OT Eval Status:   [x]NOT yet ordered  []Ordered and Pending   []Seen with Recommendations for:  []Home independently  []Home w/ assist  []HHC  []SNF  []Acute Rehab    Anticipated Discharge Day/Date:  Pending workup, cards consult stop heparin ggt(discussed with Dr. Sarmiento)    Anticipated Discharge Location: [x]Home  []HHC  []SNF  []Acute Rehab  []ECF  []LTAC  []Hospice  []Other -      Consults:      IP CONSULT TO HOSPITALIST  IP CONSULT TO VASCULAR SURGERY  IP CONSULT TO CARDIOLOGY      This patient has a high likelihood of being discharged tomorrow and is appropriate for A1 Discharge Unit in AM pending clinical course overnight: []Yes  [x]No    ------------------------------------------------------------------------------------------------------------------------------------------------------------------------    MDM      [x] High (any 2)    A. Problems (any 1)  [x] Acute/Chronic Illness/injury posing threat to life or bodily function:    [] Severe exacerbation of chronic illness:    ---------------------------------------------------------------------  B. Risk of Treatment (any 1)   [] Drugs/treatments that require intensive monitoring for toxicity include:    [] Change in code status:    [] Decision to escalate care:    [] Major surgery/procedure with associated risk factors:    ----------------------------------------------------------------------  C. Data (any 2)  [x] Discussed current management and discharge planning options with Case

## 2024-02-06 NOTE — CONSULTS
[facility] - INITIAL CONSULTATION        CHIEF COMPLAINT  Chest/epigastric discomfort      HISTORY OF PRESENTING ILLNESS  William J Hohweiler is a 83 y.o. patient with history of peripheral arterial disease, AAA, history of aortobifem bypass, hypertension, hyperlipidemia, pulm embolism, diabetes who presented to the hospital with complaints of epigastric discomfort.  He reports upper abdominal/epigastric discomfort going on for about 1 week.  This is more prominent after eating but is also associated with exertion sometimes.  Pain is sharp in nature nonradiating.  He underwent cardiac stress test earlier this admission that returned unremarkable without any signs of ischemia or infarction.  His cardiac markers and EKG were negative for ACS.  Currently he is chest pain-free.  He does report chronic exertional dyspnea which happens when he walks more than walking around the house and walking to his mailbox.  This has not changed in the recent months.      PAST MEDICAL HISTORY   has a past medical history of AAA (abdominal aortic aneurysm) (HCC), Diabetes mellitus (HCC), Hyperlipidemia, Hypertension, Pulmonary embolus (HCC), and Pulmonary fibrosis (HCC).    SURGICAL HISTORY   has a past surgical history that includes hernia repair; shoulder surgery; Testicle removal; femoral bypass; joint replacement; Nasal septum surgery; cyst removal; Toe Surgery; Cataract removal with implant; Cervical discectomy; and hip surgery (Left, 3/28/2023).     SOCIAL HISTORY   reports that he quit smoking about 21 years ago. His smoking use included cigarettes. He started smoking about 76 years ago. He has a 220.0 pack-year smoking history. He has quit using smokeless tobacco.  His smokeless tobacco use included chew. He reports current alcohol use. He reports that he does not use drugs.     FAMILY HISTORY  No family history of premature coronary artery disease, aortic disease, or valve disease.    HOME MEDICATIONS  Prior to  1.0 02/06/2024 05:47 AM    GFRAA >60 03/30/2016 06:34 AM    GFRAA >60 01/17/2013 01:50 PM    AGRATIO 1.0 02/05/2024 01:49 PM    LABGLOM >60 02/06/2024 05:47 AM    GLUCOSE 132 02/06/2024 05:47 AM    PROT 7.3 02/05/2024 01:49 PM    CALCIUM 9.4 02/06/2024 05:47 AM    BILITOT 0.3 02/05/2024 01:49 PM    ALKPHOS 108 02/05/2024 01:49 PM    AST 15 02/05/2024 01:49 PM    ALT 7 02/05/2024 01:49 PM     No results found for: \"PTINR\"  Lab Results   Component Value Date    TROPONINI <0.01 03/29/2016     Lab Results   Component Value Date    CHOL 135 02/06/2024    CHOL 114 06/21/2014     Lab Results   Component Value Date    TRIG 60 02/06/2024     Lab Results   Component Value Date    HDL 51 02/06/2024     Lab Results   Component Value Date    LDLCALC 72 02/06/2024     No results found for: \"CHOLHDLRATIO\"      CARDIAC STUDIES    EKG: Sinus rhythm with PACs    Echo: Pending    Stress: 2/6/2024   Technically difficult study with gut uptake   Normal LV function.   There is nearly uniform isotope uptake at stress and rest. There is no clear   evidence of significant myocardial ischemia or scar.    Cath: n/a    High complexity/medical decision making due to extensive data review, extensive history review, independent review of data.    High risk due to acute illness, evaluation of drug-drug interactions, medication management and diagnostic interventions.      ASSESSMENT & PLAN:    Atypical chest pain - epigastric in location appears to be mostly of gastric origin worsened by oral intake.  Sharp in nature.  Lexiscan without ischemia.  Coronary artery calcifications - noted on chest CT  Peripheral arterial disease  History of AAA  History of aortobifem bypass  Hypertension  Diabetes  Hyperlipidemia    Echo pending.  If unremarkable, no further cardiac workup is indicated at this time for atypical chest pain which is mostly of epigastric nature and appears to be of gastric origin.  GI workup is recommended.  Given coronary artery

## 2024-02-06 NOTE — PROGRESS NOTES
4 Eyes Skin Assessment     NAME:  William J Hohweiler  YOB: 1940  MEDICAL RECORD NUMBER:  [id number]    The patient is being assessed for  Admission    I agree that at least one RN has performed a thorough Head to Toe Skin Assessment on the patient. ALL assessment sites listed below have been assessed.      Areas assessed by both nurses:    Head, Face, Ears, Shoulders, Back, Chest, Arms, Elbows, Hands, Sacrum. Buttock, Coccyx, Ischium, Legs. Feet and Heels, and Under Medical Devices         Does the Patient have a Wound? No noted wound(s)       Damian Prevention initiated by RN: Yes  Wound Care Orders initiated by RN: No    Pressure Injury (Stage 3,4, Unstageable, DTI, NWPT, and Complex wounds) if present, place Wound referral order by RN under : No    New Ostomies, if present place, Ostomy referral order under : No     Nurse 1 eSignature: Electronically signed by Salvador Malone RN on 2/6/24 at 1:31 AM EST    **SHARE this note so that the co-signing nurse can place an eSignature**    Nurse 2 eSignature: Electronically signed by Florecita Dodson RN on 2/6/24 at 1:53 AM EST

## 2024-02-06 NOTE — PLAN OF CARE
Problem: Discharge Planning  Goal: Discharge to home or other facility with appropriate resources  Outcome: Progressing     Problem: Pain  Goal: Verbalizes/displays adequate comfort level or baseline comfort level  Outcome: Progressing     Problem: Skin/Tissue Integrity  Goal: Absence of new skin breakdown  Description: 1.  Monitor for areas of redness and/or skin breakdown  2.  Assess vascular access sites hourly  3.  Every 4-6 hours minimum:  Change oxygen saturation probe site  4.  Every 4-6 hours:  If on nasal continuous positive airway pressure, respiratory therapy assess nares and determine need for appliance change or resting period.  Outcome: Progressing     Problem: Safety - Adult  Goal: Free from fall injury  Outcome: Progressing     Problem: ABCDS Injury Assessment  Goal: Absence of physical injury  Outcome: Progressing     Problem: Respiratory - Adult  Goal: Achieves optimal ventilation and oxygenation  Outcome: Progressing     Problem: Cardiovascular - Adult  Goal: Maintains optimal cardiac output and hemodynamic stability  Outcome: Progressing  Goal: Absence of cardiac dysrhythmias or at baseline  Outcome: Progressing     Problem: Metabolic/Fluid and Electrolytes - Adult  Goal: Glucose maintained within prescribed range  Outcome: Progressing     Problem: Hematologic - Adult  Goal: Maintains hematologic stability  Outcome: Progressing

## 2024-02-07 LAB
EKG ATRIAL RATE: 101 BPM
EKG DIAGNOSIS: NORMAL
EKG Q-T INTERVAL: 340 MS
EKG QRS DURATION: 82 MS
EKG QTC CALCULATION (BAZETT): 447 MS
EKG R AXIS: 20 DEGREES
EKG T AXIS: 36 DEGREES
EKG VENTRICULAR RATE: 104 BPM
GLUCOSE BLD-MCNC: 107 MG/DL (ref 70–99)
GLUCOSE BLD-MCNC: 124 MG/DL (ref 70–99)
GLUCOSE BLD-MCNC: 143 MG/DL (ref 70–99)
GLUCOSE BLD-MCNC: 60 MG/DL (ref 70–99)
GLUCOSE BLD-MCNC: 81 MG/DL (ref 70–99)
PERFORMED ON: ABNORMAL
PERFORMED ON: NORMAL

## 2024-02-07 PROCEDURE — 6370000000 HC RX 637 (ALT 250 FOR IP): Performed by: NURSE PRACTITIONER

## 2024-02-07 PROCEDURE — 6360000002 HC RX W HCPCS: Performed by: INTERNAL MEDICINE

## 2024-02-07 PROCEDURE — 1200000000 HC SEMI PRIVATE

## 2024-02-07 PROCEDURE — 2580000003 HC RX 258: Performed by: NURSE PRACTITIONER

## 2024-02-07 PROCEDURE — 6370000000 HC RX 637 (ALT 250 FOR IP): Performed by: INTERNAL MEDICINE

## 2024-02-07 PROCEDURE — 2700000000 HC OXYGEN THERAPY PER DAY

## 2024-02-07 PROCEDURE — 94761 N-INVAS EAR/PLS OXIMETRY MLT: CPT

## 2024-02-07 PROCEDURE — 93010 ELECTROCARDIOGRAM REPORT: CPT | Performed by: INTERNAL MEDICINE

## 2024-02-07 RX ORDER — ENOXAPARIN SODIUM 150 MG/ML
1 INJECTION SUBCUTANEOUS 2 TIMES DAILY
Status: DISCONTINUED | OUTPATIENT
Start: 2024-02-08 | End: 2024-02-08 | Stop reason: HOSPADM

## 2024-02-07 RX ORDER — INSULIN GLARGINE 100 [IU]/ML
15 INJECTION, SOLUTION SUBCUTANEOUS 2 TIMES DAILY
Status: DISCONTINUED | OUTPATIENT
Start: 2024-02-07 | End: 2024-02-08 | Stop reason: HOSPADM

## 2024-02-07 RX ORDER — ENOXAPARIN SODIUM 100 MG/ML
40 INJECTION SUBCUTANEOUS DAILY
Status: COMPLETED | OUTPATIENT
Start: 2024-02-07 | End: 2024-02-07

## 2024-02-07 RX ORDER — PANTOPRAZOLE SODIUM 40 MG/1
40 TABLET, DELAYED RELEASE ORAL
Status: DISCONTINUED | OUTPATIENT
Start: 2024-02-08 | End: 2024-02-08

## 2024-02-07 RX ADMIN — TRAZODONE HYDROCHLORIDE 50 MG: 50 TABLET ORAL at 21:02

## 2024-02-07 RX ADMIN — ISOSORBIDE MONONITRATE 30 MG: 30 TABLET, EXTENDED RELEASE ORAL at 08:35

## 2024-02-07 RX ADMIN — INSULIN GLARGINE 15 UNITS: 100 INJECTION, SOLUTION SUBCUTANEOUS at 21:02

## 2024-02-07 RX ADMIN — NORTRIPTYLINE HYDROCHLORIDE 10 MG: 10 CAPSULE ORAL at 21:03

## 2024-02-07 RX ADMIN — ATORVASTATIN CALCIUM 40 MG: 40 TABLET, FILM COATED ORAL at 21:02

## 2024-02-07 RX ADMIN — ACETAMINOPHEN 650 MG: 325 TABLET ORAL at 18:26

## 2024-02-07 RX ADMIN — ACETAMINOPHEN 650 MG: 325 TABLET ORAL at 21:07

## 2024-02-07 RX ADMIN — ASPIRIN 81 MG 81 MG: 81 TABLET ORAL at 08:35

## 2024-02-07 RX ADMIN — ACETAMINOPHEN 650 MG: 325 TABLET ORAL at 13:12

## 2024-02-07 RX ADMIN — AMLODIPINE BESYLATE 5 MG: 5 TABLET ORAL at 08:35

## 2024-02-07 RX ADMIN — FUROSEMIDE 40 MG: 40 TABLET ORAL at 08:35

## 2024-02-07 RX ADMIN — ENOXAPARIN SODIUM 40 MG: 100 INJECTION SUBCUTANEOUS at 18:25

## 2024-02-07 RX ADMIN — SODIUM CHLORIDE, PRESERVATIVE FREE 10 ML: 5 INJECTION INTRAVENOUS at 08:36

## 2024-02-07 RX ADMIN — GABAPENTIN 300 MG: 300 CAPSULE ORAL at 18:25

## 2024-02-07 RX ADMIN — NORTRIPTYLINE HYDROCHLORIDE 10 MG: 10 CAPSULE ORAL at 08:35

## 2024-02-07 RX ADMIN — SODIUM CHLORIDE, PRESERVATIVE FREE 10 ML: 5 INJECTION INTRAVENOUS at 21:03

## 2024-02-07 ASSESSMENT — PAIN DESCRIPTION - DESCRIPTORS
DESCRIPTORS: BURNING
DESCRIPTORS: ACHING;BURNING

## 2024-02-07 ASSESSMENT — PAIN DESCRIPTION - LOCATION
LOCATION: CHEST
LOCATION: ABDOMEN;CHEST
LOCATION: ABDOMEN
LOCATION: LEG

## 2024-02-07 ASSESSMENT — PAIN DESCRIPTION - ORIENTATION
ORIENTATION: UPPER
ORIENTATION: LEFT

## 2024-02-07 ASSESSMENT — PAIN SCALES - GENERAL
PAINLEVEL_OUTOF10: 2
PAINLEVEL_OUTOF10: 6
PAINLEVEL_OUTOF10: 0
PAINLEVEL_OUTOF10: 0
PAINLEVEL_OUTOF10: 7
PAINLEVEL_OUTOF10: 5
PAINLEVEL_OUTOF10: 0
PAINLEVEL_OUTOF10: 0

## 2024-02-07 NOTE — PLAN OF CARE
Problem: Discharge Planning  Goal: Discharge to home or other facility with appropriate resources  Outcome: Progressing     Problem: Pain  Goal: Verbalizes/displays adequate comfort level or baseline comfort level  Outcome: Progressing     Problem: Skin/Tissue Integrity  Goal: Absence of new skin breakdown  Description: 1.  Monitor for areas of redness and/or skin breakdown  2.  Assess vascular access sites hourly  3.  Every 4-6 hours minimum:  Change oxygen saturation probe site  4.  Every 4-6 hours:  If on nasal continuous positive airway pressure, respiratory therapy assess nares and determine need for appliance change or resting period.  Outcome: Progressing     Problem: Safety - Adult  Goal: Free from fall injury  Outcome: Progressing     Problem: ABCDS Injury Assessment  Goal: Absence of physical injury  Outcome: Progressing     Problem: Chronic Conditions and Co-morbidities  Goal: Patient's chronic conditions and co-morbidity symptoms are monitored and maintained or improved  Outcome: Progressing     Problem: Musculoskeletal - Adult  Goal: Return mobility to safest level of function  Outcome: Progressing     Problem: Hematologic - Adult  Goal: Maintains hematologic stability  Outcome: Progressing     Problem: Metabolic/Fluid and Electrolytes - Adult  Goal: Glucose maintained within prescribed range  Outcome: Progressing

## 2024-02-07 NOTE — PROGRESS NOTES
Hospital Medicine Progress Note      Date of Admission: 2/5/2024  Hospital Day: 3      Chief Admission Complaint:  chest pain     Subjective:   seen on floor this morning, resting/sleeping w/o issues, sugars were low this morning     Presenting Admission History:          83 y.o. male, with PMH of obesity, HTN, HLD, DM,and PE who presented to [facility] with chest pain. History obtained from the patient and review of EMR.  Patient stated he began to experience some intermittent midsternal chest pain last night after eating pizza.  He stated there was \"too much damn tomato sauce on it\".  Patient stated he took some Tums without relief, but went to bed anyway.  Patient stated today he began to experience midsternal chest pain again described more as a pressure and burning sensation and worse on exertion.  He denied any radiation of the chest pain.  In the emergency department the patient had a negative troponin x 2 as well as a nonischemic EKG.  He was given a GI cocktail, and Pepcid.  A CTA chest, abdomen and pelvis was obtained that revealedocclusion of the infrarenal distal aorta extending into the bilateral common iliac arteries which may be chronic. Aortobifem graft appears mostly occluded throughout.  Reconstitution of flow within the bilateral external and internal iliac arteries with patent bilateral femoral arteries. Sigmoid diverticulosis without evidence of diverticulitis. Findings compatible with a UIP pattern of chronic interstitial lung disease with pulmonary fibrosis in the right lower lobe.  A superimposed infectious or inflammatory pneumonitis is not entirely excluded.arteries.  Vascular surgery was consulted in the emergency department and the patient was started on a heparin drip.  He was admitted for further evaluation and treatment.The patient denied any other associated symptoms as well as any aggravating and/or alleviating factors. At the time of this assessment, the patient was resting  after egd)    Leukocytosis  -may be 2/2 stress response  -CBC monitored     Chronic normocytic anemia  -no s/s of bleeding at this time  -CBC monitored    BP (!) 117/59   Pulse (!) 101   Temp 97.6 °F (36.4 °C) (Oral)   Resp 18   Ht 1.778 m (5' 10\")   Wt 105.1 kg (231 lb 9.6 oz)   SpO2 95%   BMI 33.23 kg/m²     Diet: ADULT DIET; Regular; 3 carb choices (45 gm/meal)  Diet NPO  Diet NPO Exceptions are: Sips of Water with Meds  DVT Prophylaxis: []PPx LMWH  []SQ Heparin  []IPC/SCDs  []Eliquis  []Xarelto  []Coumadin  [x]Other - lovenox     Code status: Full Code  PT/OT Eval Status:   [x]NOT yet ordered  []Ordered and Pending   []Seen with Recommendations for:  []Home independently  []Home w/ assist  []HHC  []SNF  []Acute Rehab    Anticipated Discharge Day/Date:  pending GI recs, reduce lantus dose given low nl sugars, ?1-2 days    Anticipated Discharge Location: [x]Home  []HHC  []SNF  []Acute Rehab  []ECF  []LTAC  []Hospice  []Other -      Consults:      IP CONSULT TO HOSPITALIST  IP CONSULT TO VASCULAR SURGERY  IP CONSULT TO CARDIOLOGY  IP CONSULT TO GI      This patient has a high likelihood of being discharged tomorrow and is appropriate for A1 Discharge Unit in AM pending clinical course overnight: []Yes  [x]No    ------------------------------------------------------------------------------------------------------------------------------------------------------------------------    MDM      [x] High (any 2)    A. Problems (any 1)  [x] Acute/Chronic Illness/injury posing threat to life or bodily function:    [] Severe exacerbation of chronic illness:    ---------------------------------------------------------------------  B. Risk of Treatment (any 1)   [] Drugs/treatments that require intensive monitoring for toxicity include:    [] Change in code status:    [] Decision to escalate care:    [] Major surgery/procedure with associated risk factors:

## 2024-02-07 NOTE — CONSULTS
Consultation Note    Patient Name: William J Hohweiler  : 1940  Age: 83 y.o.     Admitting Physician: Joey Liao MD   Date of Admission: 2024  3:56 PM   Primary Care Physician: Jonah Payne MD        William J Hohweiler is being seen at the request of Joey Liao MD for severe ?epigastric chest pain, cards request and felt noncardiac.    History of Present Illness:  83 year old M with PMH significant for AAA, diabetes mellitus, hyperlipidemia, hypertension, pulmonary fibrosis, and pulmonary embolus. Previous abdominal surgeries include hernia repair.     Patient admitted to the hospital with chest pain . Cardiac work up has been unremarkable and suspect non cardiac chest pain. Patient reports having low/substernal chest pain that is aggravated by eating. He report increased pain after eating. Prior to admission he reports an episode of nausea/vomiting that started a few hours after eating pizza. He also feels that food gets stuck when swallowing. Denies frequent heartburn but does take Tums as needed for dyspepsia.   Denies overt GI bleeding or abdominal pain. Denies changes in bowel habits. No prior EGD. Last colonoscopy .     GI History:  2017 Colonoscopy with Dr. Palomo  Findings:  Normal colon cancer screening   Moderate left-sided diverticulosis     Past Medical History:  Past Medical History:   Diagnosis Date    AAA (abdominal aortic aneurysm) (HCC)     Diabetes mellitus (HCC)     Hyperlipidemia     Hypertension     Pulmonary embolus (HCC)     Pulmonary fibrosis (HCC)         Past Surgical History:  Past Surgical History:   Procedure Laterality Date    CATARACT REMOVAL WITH IMPLANT      bilateral eyes    CERVICAL DISCECTOMY      and fusion (anterior)    CYST REMOVAL      from the buttocks    FEMORAL BYPASS      bi-femoral bypass    HERNIA REPAIR      x 2    HIP SURGERY Left 3/28/2023    LEFT HIP PINNING performed by Gorge Packer MD at [facility] OR    JOINT

## 2024-02-07 NOTE — CONSULTS
Consult Placed     Who: MILI WANG   Date:2/7/2024  Time:0937     Electronically signed by Kamari Romero on 2/7/2024 at 9:36 AM

## 2024-02-08 ENCOUNTER — ANESTHESIA (OUTPATIENT)
Dept: ENDOSCOPY | Age: 84
DRG: 313 | End: 2024-02-08
Payer: MEDICARE

## 2024-02-08 ENCOUNTER — ANESTHESIA EVENT (OUTPATIENT)
Dept: ENDOSCOPY | Age: 84
DRG: 313 | End: 2024-02-08
Payer: MEDICARE

## 2024-02-08 VITALS
DIASTOLIC BLOOD PRESSURE: 76 MMHG | HEIGHT: 70 IN | BODY MASS INDEX: 32.64 KG/M2 | TEMPERATURE: 97.6 F | WEIGHT: 228 LBS | SYSTOLIC BLOOD PRESSURE: 151 MMHG | RESPIRATION RATE: 18 BRPM | HEART RATE: 92 BPM | OXYGEN SATURATION: 97 %

## 2024-02-08 LAB
BASOPHILS # BLD: 0.1 K/UL (ref 0–0.2)
BASOPHILS NFR BLD: 0.7 %
DEPRECATED RDW RBC AUTO: 13.2 % (ref 12.4–15.4)
EOSINOPHIL # BLD: 0.6 K/UL (ref 0–0.6)
EOSINOPHIL NFR BLD: 8.2 %
GLUCOSE BLD-MCNC: 100 MG/DL (ref 70–99)
GLUCOSE BLD-MCNC: 74 MG/DL (ref 70–99)
GLUCOSE BLD-MCNC: 92 MG/DL (ref 70–99)
HCT VFR BLD AUTO: 38.2 % (ref 40.5–52.5)
HGB BLD-MCNC: 13 G/DL (ref 13.5–17.5)
LYMPHOCYTES # BLD: 3 K/UL (ref 1–5.1)
LYMPHOCYTES NFR BLD: 38.1 %
MCH RBC QN AUTO: 31.7 PG (ref 26–34)
MCHC RBC AUTO-ENTMCNC: 34.1 G/DL (ref 31–36)
MCV RBC AUTO: 92.9 FL (ref 80–100)
MONOCYTES # BLD: 0.5 K/UL (ref 0–1.3)
MONOCYTES NFR BLD: 5.9 %
NEUTROPHILS # BLD: 3.7 K/UL (ref 1.7–7.7)
NEUTROPHILS NFR BLD: 47.1 %
PERFORMED ON: ABNORMAL
PERFORMED ON: NORMAL
PERFORMED ON: NORMAL
PLATELET # BLD AUTO: 231 K/UL (ref 135–450)
PMV BLD AUTO: 6.3 FL (ref 5–10.5)
RBC # BLD AUTO: 4.11 M/UL (ref 4.2–5.9)
WBC # BLD AUTO: 7.8 K/UL (ref 4–11)

## 2024-02-08 PROCEDURE — 88342 IMHCHEM/IMCYTCHM 1ST ANTB: CPT

## 2024-02-08 PROCEDURE — 36415 COLL VENOUS BLD VENIPUNCTURE: CPT

## 2024-02-08 PROCEDURE — 2580000003 HC RX 258: Performed by: ANESTHESIOLOGY

## 2024-02-08 PROCEDURE — 3609012400 HC EGD TRANSORAL BIOPSY SINGLE/MULTIPLE: Performed by: INTERNAL MEDICINE

## 2024-02-08 PROCEDURE — 6370000000 HC RX 637 (ALT 250 FOR IP): Performed by: NURSE PRACTITIONER

## 2024-02-08 PROCEDURE — 85025 COMPLETE CBC W/AUTO DIFF WBC: CPT

## 2024-02-08 PROCEDURE — 88341 IMHCHEM/IMCYTCHM EA ADD ANTB: CPT

## 2024-02-08 PROCEDURE — 0DB68ZX EXCISION OF STOMACH, VIA NATURAL OR ARTIFICIAL OPENING ENDOSCOPIC, DIAGNOSTIC: ICD-10-PCS | Performed by: INTERNAL MEDICINE

## 2024-02-08 PROCEDURE — 2580000003 HC RX 258: Performed by: NURSE ANESTHETIST, CERTIFIED REGISTERED

## 2024-02-08 PROCEDURE — 6370000000 HC RX 637 (ALT 250 FOR IP): Performed by: REGISTERED NURSE

## 2024-02-08 PROCEDURE — 0DB58ZX EXCISION OF ESOPHAGUS, VIA NATURAL OR ARTIFICIAL OPENING ENDOSCOPIC, DIAGNOSTIC: ICD-10-PCS | Performed by: INTERNAL MEDICINE

## 2024-02-08 PROCEDURE — 6370000000 HC RX 637 (ALT 250 FOR IP): Performed by: INTERNAL MEDICINE

## 2024-02-08 PROCEDURE — 2580000003 HC RX 258: Performed by: NURSE PRACTITIONER

## 2024-02-08 PROCEDURE — 88305 TISSUE EXAM BY PATHOLOGIST: CPT

## 2024-02-08 PROCEDURE — 2500000003 HC RX 250 WO HCPCS: Performed by: NURSE ANESTHETIST, CERTIFIED REGISTERED

## 2024-02-08 PROCEDURE — 2709999900 HC NON-CHARGEABLE SUPPLY: Performed by: INTERNAL MEDICINE

## 2024-02-08 PROCEDURE — 7100000011 HC PHASE II RECOVERY - ADDTL 15 MIN: Performed by: INTERNAL MEDICINE

## 2024-02-08 PROCEDURE — 3700000000 HC ANESTHESIA ATTENDED CARE: Performed by: INTERNAL MEDICINE

## 2024-02-08 PROCEDURE — 7100000010 HC PHASE II RECOVERY - FIRST 15 MIN: Performed by: INTERNAL MEDICINE

## 2024-02-08 PROCEDURE — 88312 SPECIAL STAINS GROUP 1: CPT

## 2024-02-08 PROCEDURE — 6360000002 HC RX W HCPCS: Performed by: NURSE ANESTHETIST, CERTIFIED REGISTERED

## 2024-02-08 RX ORDER — OMEPRAZOLE 40 MG/1
40 CAPSULE, DELAYED RELEASE ORAL 2 TIMES DAILY
Qty: 90 CAPSULE | Refills: 1 | Status: SHIPPED | OUTPATIENT
Start: 2024-02-08

## 2024-02-08 RX ORDER — PANTOPRAZOLE SODIUM 40 MG/1
40 TABLET, DELAYED RELEASE ORAL
Status: DISCONTINUED | OUTPATIENT
Start: 2024-02-08 | End: 2024-02-08 | Stop reason: HOSPADM

## 2024-02-08 RX ORDER — SODIUM CHLORIDE, SODIUM LACTATE, POTASSIUM CHLORIDE, CALCIUM CHLORIDE 600; 310; 30; 20 MG/100ML; MG/100ML; MG/100ML; MG/100ML
INJECTION, SOLUTION INTRAVENOUS CONTINUOUS PRN
Status: DISCONTINUED | OUTPATIENT
Start: 2024-02-08 | End: 2024-02-08 | Stop reason: SDUPTHER

## 2024-02-08 RX ORDER — ONDANSETRON 4 MG/1
4 TABLET, ORALLY DISINTEGRATING ORAL EVERY 8 HOURS PRN
Qty: 20 TABLET | Refills: 0 | Status: SHIPPED | OUTPATIENT
Start: 2024-02-08

## 2024-02-08 RX ORDER — SODIUM CHLORIDE, SODIUM LACTATE, POTASSIUM CHLORIDE, CALCIUM CHLORIDE 600; 310; 30; 20 MG/100ML; MG/100ML; MG/100ML; MG/100ML
INJECTION, SOLUTION INTRAVENOUS CONTINUOUS
Status: DISCONTINUED | OUTPATIENT
Start: 2024-02-08 | End: 2024-02-08 | Stop reason: HOSPADM

## 2024-02-08 RX ORDER — LIDOCAINE HYDROCHLORIDE 20 MG/ML
INJECTION, SOLUTION INFILTRATION; PERINEURAL PRN
Status: DISCONTINUED | OUTPATIENT
Start: 2024-02-08 | End: 2024-02-08 | Stop reason: SDUPTHER

## 2024-02-08 RX ORDER — PROPOFOL 10 MG/ML
INJECTION, EMULSION INTRAVENOUS PRN
Status: DISCONTINUED | OUTPATIENT
Start: 2024-02-08 | End: 2024-02-08 | Stop reason: SDUPTHER

## 2024-02-08 RX ADMIN — ISOSORBIDE MONONITRATE 30 MG: 30 TABLET, EXTENDED RELEASE ORAL at 09:04

## 2024-02-08 RX ADMIN — NORTRIPTYLINE HYDROCHLORIDE 10 MG: 10 CAPSULE ORAL at 09:04

## 2024-02-08 RX ADMIN — ACETAMINOPHEN 650 MG: 325 TABLET ORAL at 16:14

## 2024-02-08 RX ADMIN — FUROSEMIDE 40 MG: 40 TABLET ORAL at 09:04

## 2024-02-08 RX ADMIN — SODIUM CHLORIDE, POTASSIUM CHLORIDE, SODIUM LACTATE AND CALCIUM CHLORIDE: 600; 310; 30; 20 INJECTION, SOLUTION INTRAVENOUS at 13:40

## 2024-02-08 RX ADMIN — SODIUM CHLORIDE, SODIUM LACTATE, POTASSIUM CHLORIDE, AND CALCIUM CHLORIDE: .6; .31; .03; .02 INJECTION, SOLUTION INTRAVENOUS at 13:56

## 2024-02-08 RX ADMIN — LIDOCAINE HYDROCHLORIDE 80 MG: 20 INJECTION, SOLUTION INFILTRATION; PERINEURAL at 14:01

## 2024-02-08 RX ADMIN — PANTOPRAZOLE SODIUM 40 MG: 40 TABLET, DELAYED RELEASE ORAL at 16:14

## 2024-02-08 RX ADMIN — ASPIRIN 81 MG 81 MG: 81 TABLET ORAL at 09:04

## 2024-02-08 RX ADMIN — PANTOPRAZOLE SODIUM 40 MG: 40 TABLET, DELAYED RELEASE ORAL at 05:37

## 2024-02-08 RX ADMIN — PROPOFOL 50 MG: 10 INJECTION, EMULSION INTRAVENOUS at 14:01

## 2024-02-08 RX ADMIN — AMLODIPINE BESYLATE 5 MG: 5 TABLET ORAL at 09:04

## 2024-02-08 RX ADMIN — SODIUM CHLORIDE, PRESERVATIVE FREE 10 ML: 5 INJECTION INTRAVENOUS at 09:05

## 2024-02-08 RX ADMIN — ACETAMINOPHEN 650 MG: 325 TABLET ORAL at 09:06

## 2024-02-08 RX ADMIN — PROPOFOL 40 MG: 10 INJECTION, EMULSION INTRAVENOUS at 14:06

## 2024-02-08 ASSESSMENT — PAIN - FUNCTIONAL ASSESSMENT: PAIN_FUNCTIONAL_ASSESSMENT: 0-10

## 2024-02-08 ASSESSMENT — PAIN DESCRIPTION - LOCATION
LOCATION: BACK;HIP

## 2024-02-08 ASSESSMENT — PAIN SCALES - GENERAL
PAINLEVEL_OUTOF10: 0
PAINLEVEL_OUTOF10: 6
PAINLEVEL_OUTOF10: 0
PAINLEVEL_OUTOF10: 6
PAINLEVEL_OUTOF10: 0
PAINLEVEL_OUTOF10: 7

## 2024-02-08 ASSESSMENT — PAIN DESCRIPTION - DESCRIPTORS: DESCRIPTORS: ACHING;THROBBING

## 2024-02-08 ASSESSMENT — PAIN DESCRIPTION - ORIENTATION
ORIENTATION: LEFT
ORIENTATION: LEFT

## 2024-02-08 NOTE — H&P
[facility]   Pre-operative History and Physical    Patient: William J Hohweiler  : 1940  Acct#:     HISTORY OF PRESENT ILLNESS:    The patient is a 83 y.o. male who presents for atypical chest pain and esophageal dysphagia.  History of anticoagulation which has been held for the procedure.  He has significant pulmonary fibrosis on 2 L nasal cannula.    Indications: Atypical chest pain esophageal dysphagia    Past Medical History:        Diagnosis Date    AAA (abdominal aortic aneurysm) (HCC)     Diabetes mellitus (HCC)     Hyperlipidemia     Hypertension     Pulmonary embolus (HCC)     Pulmonary fibrosis (HCC)       Past Surgical History:        Procedure Laterality Date    CATARACT REMOVAL WITH IMPLANT      bilateral eyes    CERVICAL DISCECTOMY      and fusion (anterior)    CYST REMOVAL      from the buttocks    FEMORAL BYPASS      bi-femoral bypass    HERNIA REPAIR      x 2    HIP SURGERY Left 3/28/2023    LEFT HIP PINNING performed by Gorge Packer MD at [facility] OR    JOINT REPLACEMENT      bilateral knee replacement    NASAL SEPTUM SURGERY      SHOULDER SURGERY      left    TESTICLE REMOVAL      left    TOE SURGERY        Medications Prior to Admission:   No current facility-administered medications on file prior to encounter.     Current Outpatient Medications on File Prior to Encounter   Medication Sig Dispense Refill    alendronate (FOSAMAX) 70 MG tablet       pioglitazone (ACTOS) 45 MG tablet       ipratropium (ATROVENT HFA) 17 MCG/ACT inhaler Inhale 2 puffs into the lungs      traMADol (ULTRAM) 50 MG tablet       traZODone (DESYREL) 50 MG tablet       diclofenac sodium (VOLTAREN) 1 % GEL Apply 4 g topically 4 times daily as needed for Pain 100 g 3    omeprazole (PRILOSEC) 40 MG delayed release capsule Take 1 capsule by mouth every morning (before breakfast) 90 capsule 1    acetaminophen (TYLENOL) 325 MG tablet Take 2 tablets by mouth in the morning and 2 tablets at noon and 2 tablets  in the evening and 2 tablets before bedtime. 120 tablet 3    apixaban (ELIQUIS) 2.5 MG TABS tablet Take 1 tablet by mouth 2 times daily 60 tablet     lidocaine 4 % external patch Place 1 patch onto the skin daily      gabapentin (NEURONTIN) 300 MG capsule Take 1 capsule by mouth every evening.      celecoxib (CELEBREX) 200 MG capsule Take 1 capsule by mouth 2 times daily      Insulin Glargine (TOUJEO SOLOSTAR SC) Inject 30 Units into the skin in the morning and at bedtime      Pirfenidone 801 MG TABS Take 1 tablet by mouth 3 times daily (with meals) 90 tablet 11    nortriptyline (PAMELOR) 50 MG capsule       zolpidem (AMBIEN) 10 MG tablet       atorvastatin (LIPITOR) 80 MG tablet 0.5 tablets      amLODIPine (NORVASC) 5 MG tablet TAKE 1 TABLET BY MOUTH EVERY DAY 90 tablet 0    isosorbide mononitrate (IMDUR) 30 MG CR tablet TAKE 1 TABLET BY MOUTH EVERY DAY 30 tablet 5    Multiple Vitamins-Minerals (THERAPEUTIC MULTIVITAMIN-MINERALS) tablet Take 1 tablet by mouth daily      furosemide (LASIX) 40 MG tablet Take 1 tablet by mouth every morning      SITagliptin (JANUVIA) 100 MG tablet Take 1 tablet by mouth daily      aspirin 81 MG tablet Take 1 tablet by mouth daily          Allergies:  Azithromycin    Social History:   Social History     Socioeconomic History    Marital status:      Spouse name: Not on file    Number of children: Not on file    Years of education: Not on file    Highest education level: Not on file   Occupational History    Not on file   Tobacco Use    Smoking status: Former     Current packs/day: 0.00     Average packs/day: 4.0 packs/day for 55.0 years (220.0 ttl pk-yrs)     Types: Cigarettes     Start date: 1948     Quit date: 2003     Years since quittin.1    Smokeless tobacco: Former     Types: Chew   Vaping Use    Vaping Use: Never used   Substance and Sexual Activity    Alcohol use: Yes     Comment: occasionally    Drug use: No    Sexual activity: Not on file   Other Topics

## 2024-02-08 NOTE — PLAN OF CARE
Problem: Discharge Planning  Goal: Discharge to home or other facility with appropriate resources  Outcome: Adequate for Discharge     Problem: Pain  Goal: Verbalizes/displays adequate comfort level or baseline comfort level  Outcome: Adequate for Discharge     Problem: Skin/Tissue Integrity  Goal: Absence of new skin breakdown  Description: 1.  Monitor for areas of redness and/or skin breakdown  2.  Assess vascular access sites hourly  3.  Every 4-6 hours minimum:  Change oxygen saturation probe site  4.  Every 4-6 hours:  If on nasal continuous positive airway pressure, respiratory therapy assess nares and determine need for appliance change or resting period.  Outcome: Adequate for Discharge     Problem: Safety - Adult  Goal: Free from fall injury  Outcome: Adequate for Discharge     Problem: ABCDS Injury Assessment  Goal: Absence of physical injury  Outcome: Adequate for Discharge     Problem: Respiratory - Adult  Goal: Achieves optimal ventilation and oxygenation  Outcome: Adequate for Discharge     Problem: Cardiovascular - Adult  Goal: Maintains optimal cardiac output and hemodynamic stability  2/8/2024 1542 by Chanel Rao RN  Outcome: Adequate for Discharge  2/8/2024 1138 by Chanel Rao RN  Outcome: Progressing  Goal: Absence of cardiac dysrhythmias or at baseline  2/8/2024 1542 by Chanel Rao RN  Outcome: Adequate for Discharge  2/8/2024 1138 by Chanel Rao RN  Outcome: Progressing     Problem: Metabolic/Fluid and Electrolytes - Adult  Goal: Glucose maintained within prescribed range  2/8/2024 1542 by Chanel Rao RN  Outcome: Adequate for Discharge  2/8/2024 1138 by Chanel Rao RN  Outcome: Progressing     Problem: Hematologic - Adult  Goal: Maintains hematologic stability  2/8/2024 1542 by Chanel Rao RN  Outcome: Adequate for Discharge  2/8/2024 1138 by Chanel Rao RN  Outcome: Progressing     Problem: Chronic Conditions and Co-morbidities  Goal:  Patient's chronic conditions and co-morbidity symptoms are monitored and maintained or improved  Outcome: Adequate for Discharge     Problem: Musculoskeletal - Adult  Goal: Return mobility to safest level of function  Outcome: Adequate for Discharge

## 2024-02-08 NOTE — PROCEDURES
Endoscopy Note    Patient: William J Hohweiler  : 1940      Procedure: Esophagogastroduodenoscopy with biopsies    Date:  2024     Surgeon:  Earl Glez MD     Referring Physician:  Jonah Payne MD      History:      INDICATION: atypical chest pain     ASA: 3  SEDATION: MAC         Operative Surgeon: Earl Glez MD  Scope Type: gastroscope       Preoperative Diagnosis: atypical chest pain dysphagia   Postoperative Diagnosis: LA grade C esophagitis esophageal ulcer gastritis hiatal hernia    Procedure Performed: EGD    Procedure Details:    With the patient in left lateral position the endoscope was passed through the hypopharynx into the esophagus. The scope as then passed through the esophagus to the second portion of the duodenum. All visualized portions were carefully inspected. The gastric air was suctioned and the scope as removed. Patient tolerated the procedure well. Findings and maneuvers are discussed below.      Complications:  None  Estimated Blood Loss: minimal to none    Post Operative Findings:   Esophagus: There was LA grade C esophagitis with distal esophagus there was a 1.5 cm ulceration that is also appreciated in this region.  Biopsies were taken of the distal esophagus to rule out viral pathologies.  Stomach: Retroflexion demonstrated moderate hiatal hernia 2-3cm.  Antral erythema biopsies were taken rule H. pylori no stomach ulcers were seen.  Duodenum: Moderate duodenitis in the duodenal bulb otherwise normal    Plan: Follow-up biopsies recommend acid suppression 40 mg twice daily to induce healing repeat upper endoscopy in 8 weeks for dilation at that time as he does have some luminal tapering at the distal esophagus. Can restart blood thinner today         Signed By: MD Earl Bonilla MD,   [facility]  [phone]    Please note that some or all of this record was generated using voice recognition software. If there are any questions about the

## 2024-02-08 NOTE — CARE COORDINATION
Chart reviewed day 3. Care provided by cards, GI and IM. Pt is from home with wife. Pt uses a cane and walker for mobility. Pt is on home O2 with Evelin. Pt to have an EGD today for severe epigastric pain. Will continue to follow course for needs. Roma Renae RN

## 2024-02-08 NOTE — ANESTHESIA PRE PROCEDURE
Department of Anesthesiology  Preprocedure Note       Name:  William J Hohweiler   Age:  83 y.o.  :  1940                                          MRN:  [id number]         Date:  2024      Surgeon: Surgeon(s):  Ealr Glez MD    Procedure: Procedure(s):  EGD DIAGNOSTIC ONLY    Medications prior to admission:   Prior to Admission medications    Medication Sig Start Date End Date Taking? Authorizing Provider   alendronate (FOSAMAX) 70 MG tablet  24   Daniel Doty MD   pioglitazone (ACTOS) 45 MG tablet  24   Daniel Doty MD   ipratropium (ATROVENT HFA) 17 MCG/ACT inhaler Inhale 2 puffs into the lungs    Daniel Doty MD   traMADol (ULTRAM) 50 MG tablet  24   Daniel Doty MD   traZODone (DESYREL) 50 MG tablet  24   Daniel Doty MD   diclofenac sodium (VOLTAREN) 1 % GEL Apply 4 g topically 4 times daily as needed for Pain 23   Gorge Packer MD   omeprazole (PRILOSEC) 40 MG delayed release capsule Take 1 capsule by mouth every morning (before breakfast) 23   Espinoza Melara MD   acetaminophen (TYLENOL) 325 MG tablet Take 2 tablets by mouth in the morning and 2 tablets at noon and 2 tablets in the evening and 2 tablets before bedtime. 3/29/23   Luana Aly PA   apixaban (ELIQUIS) 2.5 MG TABS tablet Take 1 tablet by mouth 2 times daily 3/29/23   Luana Aly PA   lidocaine 4 % external patch Place 1 patch onto the skin daily 3/30/23   Luana Aly PA   gabapentin (NEURONTIN) 300 MG capsule Take 1 capsule by mouth every evening.    Daniel Doty MD   celecoxib (CELEBREX) 200 MG capsule Take 1 capsule by mouth 2 times daily    Daniel Doty MD   Insulin Glargine (TOUJEO SOLOSTAR SC) Inject 30 Units into the skin in the morning and at bedtime    Daniel Doty MD   Pirfenidone 801 MG TABS Take 1 tablet by mouth 3 times daily (with meals) 22   Espinoza Melara MD   nortriptyline

## 2024-02-08 NOTE — PLAN OF CARE
Problem: Cardiovascular - Adult  Goal: Maintains optimal cardiac output and hemodynamic stability  2/8/2024 1138 by Chanel Rao RN  Outcome: Progressing  Goal: Absence of cardiac dysrhythmias or at baseline  2/8/2024 1138 by Chanel Rao RN  Outcome: Progressing     Problem: Metabolic/Fluid and Electrolytes - Adult  Goal: Glucose maintained within prescribed range  2/8/2024 1138 by Chanel Rao RN  Outcome: Progressing     Problem: Hematologic - Adult  Goal: Maintains hematologic stability  2/8/2024 1138 by Chanel Rao RN  Outcome: Progressing

## 2024-02-08 NOTE — PROGRESS NOTES
Patient discharged via wheelchair.  Prescriptions delivered to patient at bedside.  Discharge instructions reviewed with patient and wife.

## 2024-02-08 NOTE — PROGRESS NOTES
Pt transferred to inpatient hospital room 360 via gurney accompanied by transport staff  Pt alert and oriented x4  Calm/appropriate  VSS O2 @ 2L N/C  Iv site assessed without evidence of infiltration on arrival  Respiration  easy unlabored - auscultated -clear bilaterally anterior and posterior fields  Abd soft nondistended : BS+x4 quadrants : no nausea   Oxygen tank checked for  adequate volume before transport   CMU notified

## 2024-02-08 NOTE — PLAN OF CARE
Problem: Discharge Planning  Goal: Discharge to home or other facility with appropriate resources  Outcome: Progressing     Problem: Skin/Tissue Integrity  Goal: Absence of new skin breakdown  Description: 1.  Monitor for areas of redness and/or skin breakdown  2.  Assess vascular access sites hourly  3.  Every 4-6 hours minimum:  Change oxygen saturation probe site  4.  Every 4-6 hours:  If on nasal continuous positive airway pressure, respiratory therapy assess nares and determine need for appliance change or resting period.  2/8/2024 0005 by Ishmael Jarrell RN  Outcome: Progressing  2/7/2024 1937 by Chanel Rao RN  Outcome: Progressing     Problem: Safety - Adult  Goal: Free from fall injury  2/8/2024 0005 by Ishmael Jarrell RN  Outcome: Progressing  2/7/2024 1937 by Chanel Rao RN  Outcome: Progressing     Problem: ABCDS Injury Assessment  Goal: Absence of physical injury  2/8/2024 0005 by Ishmael Jarrell RN  Outcome: Progressing  2/7/2024 1937 by Chanel Rao RN  Outcome: Progressing     Problem: Chronic Conditions and Co-morbidities  Goal: Patient's chronic conditions and co-morbidity symptoms are monitored and maintained or improved  2/8/2024 0005 by Ishmael Jarrell RN  Outcome: Progressing  2/7/2024 1937 by Chanel Rao RN  Outcome: Progressing     Problem: Hematologic - Adult  Goal: Maintains hematologic stability  2/8/2024 0005 by Ishmael Jarrell RN  Outcome: Progressing  2/7/2024 1937 by Chanel Rao RN  Outcome: Progressing

## 2024-02-08 NOTE — ANESTHESIA POSTPROCEDURE EVALUATION
Department of Anesthesiology  Postprocedure Note    Patient: William J Hohweiler  MRN: [id number]  YOB: 1940  Date of evaluation: 2/8/2024    Procedure Summary       Date: 02/08/24 Room / Location: [place] [id number] / [facility]    Anesthesia Start: 1356 Anesthesia Stop: 1412    Procedure: EGD BIOPSY Diagnosis:       Chest pain, unspecified type      (Chest pain, unspecified type [R07.9])    Surgeons: Earl Glez MD Responsible Provider: Diego Li MD    Anesthesia Type: MAC ASA Status: 3            Anesthesia Type: No value filed.    Hipolito Phase I: Hipolito Score: 10    Hipolito Phase II: Hipolito Score: 7    Anesthesia Post Evaluation    Patient location during evaluation: PACU  Patient participation: complete - patient participated  Level of consciousness: awake and alert  Airway patency: patent  Nausea & Vomiting: no nausea and no vomiting  Cardiovascular status: hemodynamically stable  Respiratory status: acceptable  Hydration status: euvolemic  Pain management: adequate    No notable events documented.

## 2024-02-08 NOTE — DISCHARGE SUMMARY
V2.0  Discharge Summary    Name:  William J Hohweiler /Age/Sex: 1940 (83 y.o. male)   Admit Date: 2024  Discharge Date: 24    MRN & CSN:  [id number] & [id number] Encounter Date and Time 24 2:44 PM EST    Attending:  Alan Lopez MD Discharging Provider: Alan Lopez MD       Discharge Diagnosis:   Atypical chest pain  LA grade C esophagitis with 1.5 cm distal esophageal ulceration with antral erythema and moderate duodenitis in the duodenal bulb  Hyperlipidemia  Hypertension  History of PE on Eliquis  Chronic normocytic anemia       Hospital Course:     Brief HPI: William J Hohweiler is a 83 y.o. male who presented with chest pain    Brief Problem Based Course:   Admitted for chest pain rule out ACS and cardiology was consulted and nuclear stress test was done which was negative for ischemia and cardiology thinks it is noncardiac chest pain has GI consulted patient underwent EGD which shows LA grade C esophagitis with 1.5 cm distal esophageal ulceration with antral erythema and moderate duodenitis in the duodenal bulb recommended 40 mg twice daily PPI with repeat EGD and possible dilatation of the distal and if stricture develops in the esophagus in 8 weeks time    Patient hemoglobin remained stable  He is being discharged home with appropriate follow-up      The patient expressed appropriate understanding of, and agreement with the discharge recommendations, medications, and plan.     Consults this admission:  IP CONSULT TO HOSPITALIST  IP CONSULT TO VASCULAR SURGERY  IP CONSULT TO CARDIOLOGY  IP CONSULT TO GI    Comment: Please note this report has been produced using speech recognition software and may contain errors related to that system including errors in grammar, punctuation, and spelling, as well as words and phrases that may be inappropriate. If there are any questions or concerns please feel free to contact the dictating provider for clarification.   Discharge Instruction:  fibrotic changes with heterogeneous densities throughout right lung field as also noted on previous studies. No definable acute cardiopulmonary abnormality. Grossly normal cardiac size.  No CHF.       CBC:   Recent Labs     02/06/24  0548 02/08/24  0953   WBC 7.1 7.8   HGB 11.2* 13.0*    231     BMP:    Recent Labs     02/06/24  0547      K 4.6      CO2 28   BUN 24*   CREATININE 1.0   GLUCOSE 132*     Hepatic: No results for input(s): \"AST\", \"ALT\", \"ALB\", \"BILITOT\", \"ALKPHOS\" in the last 72 hours.  Lipids:   Lab Results   Component Value Date/Time    CHOL 135 02/06/2024 05:47 AM    HDL 51 02/06/2024 05:47 AM    TRIG 60 02/06/2024 05:47 AM     Hemoglobin A1C:   Lab Results   Component Value Date/Time    LABA1C 6.3 02/06/2024 05:48 AM     TSH:   Lab Results   Component Value Date/Time    TSH 1.21 03/29/2023 06:39 AM     Troponin: No results found for: \"TROPONINT\"  Lactic Acid: No results for input(s): \"LACTA\" in the last 72 hours.  BNP: No results for input(s): \"PROBNP\" in the last 72 hours.  UA:  Lab Results   Component Value Date/Time    NITRU Negative 03/27/2023 02:31 PM    COLORU Yellow 03/27/2023 02:31 PM    PHUR 6.0 03/27/2023 02:31 PM    WBCUA None seen 03/30/2016 05:57 AM    RBCUA None seen 03/30/2016 05:57 AM    CLARITYU Clear 03/27/2023 02:31 PM    SPECGRAV 1.010 03/27/2023 02:31 PM    LEUKOCYTESUR Negative 03/27/2023 02:31 PM    UROBILINOGEN 0.2 03/27/2023 02:31 PM    BILIRUBINUR Negative 03/27/2023 02:31 PM    BILIRUBINUR NEGATIVE 01/17/2012 01:47 PM    BLOODU Negative 03/27/2023 02:31 PM    GLUCOSEU Negative 03/27/2023 02:31 PM    GLUCOSEU NEGATIVE 01/17/2012 01:47 PM    KETUA Negative 03/27/2023 02:31 PM     Urine Cultures: No results found for: \"LABURIN\"  Blood Cultures:   Lab Results   Component Value Date/Time    BC No growth after 5 days of incubation. 03/29/2016 09:11 PM     Lab Results   Component Value Date/Time    BLOODCULT2 No growth after 5 days of incubation. 03/29/2016

## 2024-02-08 NOTE — PLAN OF CARE
Problem: Skin/Tissue Integrity  Goal: Absence of new skin breakdown  Description: 1.  Monitor for areas of redness and/or skin breakdown  2.  Assess vascular access sites hourly  3.  Every 4-6 hours minimum:  Change oxygen saturation probe site  4.  Every 4-6 hours:  If on nasal continuous positive airway pressure, respiratory therapy assess nares and determine need for appliance change or resting period.  Outcome: Progressing     Problem: Safety - Adult  Goal: Free from fall injury  Outcome: Progressing     Problem: ABCDS Injury Assessment  Goal: Absence of physical injury  Outcome: Progressing     Problem: Respiratory - Adult  Goal: Achieves optimal ventilation and oxygenation  Outcome: Progressing     Problem: Cardiovascular - Adult  Goal: Maintains optimal cardiac output and hemodynamic stability  Outcome: Progressing  Goal: Absence of cardiac dysrhythmias or at baseline  Outcome: Progressing     Problem: Metabolic/Fluid and Electrolytes - Adult  Goal: Glucose maintained within prescribed range  Outcome: Progressing     Problem: Hematologic - Adult  Goal: Maintains hematologic stability  Outcome: Progressing     Problem: Chronic Conditions and Co-morbidities  Goal: Patient's chronic conditions and co-morbidity symptoms are monitored and maintained or improved  Outcome: Progressing

## 2024-02-22 ENCOUNTER — TELEPHONE (OUTPATIENT)
Dept: PULMONOLOGY | Age: 84
End: 2024-02-22

## 2024-02-22 ENCOUNTER — OFFICE VISIT (OUTPATIENT)
Dept: PULMONOLOGY | Age: 84
End: 2024-02-22
Payer: MEDICARE

## 2024-02-22 VITALS
RESPIRATION RATE: 20 BRPM | SYSTOLIC BLOOD PRESSURE: 126 MMHG | BODY MASS INDEX: 34.07 KG/M2 | HEART RATE: 106 BPM | OXYGEN SATURATION: 96 % | DIASTOLIC BLOOD PRESSURE: 69 MMHG | TEMPERATURE: 97 F | HEIGHT: 70 IN | WEIGHT: 238 LBS

## 2024-02-22 DIAGNOSIS — G47.33 OSA TREATED WITH BIPAP: ICD-10-CM

## 2024-02-22 DIAGNOSIS — E66.01 CLASS 2 SEVERE OBESITY DUE TO EXCESS CALORIES WITH SERIOUS COMORBIDITY AND BODY MASS INDEX (BMI) OF 35.0 TO 35.9 IN ADULT (HCC): ICD-10-CM

## 2024-02-22 DIAGNOSIS — Z09 HOSPITAL DISCHARGE FOLLOW-UP: ICD-10-CM

## 2024-02-22 DIAGNOSIS — J98.6 PARALYZED HEMIDIAPHRAGM: ICD-10-CM

## 2024-02-22 DIAGNOSIS — J84.10 PULMONARY FIBROSIS (HCC): Primary | ICD-10-CM

## 2024-02-22 DIAGNOSIS — K21.9 GASTROESOPHAGEAL REFLUX DISEASE WITHOUT ESOPHAGITIS: ICD-10-CM

## 2024-02-22 PROCEDURE — G8484 FLU IMMUNIZE NO ADMIN: HCPCS | Performed by: INTERNAL MEDICINE

## 2024-02-22 PROCEDURE — 1123F ACP DISCUSS/DSCN MKR DOCD: CPT | Performed by: INTERNAL MEDICINE

## 2024-02-22 PROCEDURE — 1111F DSCHRG MED/CURRENT MED MERGE: CPT | Performed by: INTERNAL MEDICINE

## 2024-02-22 PROCEDURE — 3078F DIAST BP <80 MM HG: CPT | Performed by: INTERNAL MEDICINE

## 2024-02-22 PROCEDURE — 1036F TOBACCO NON-USER: CPT | Performed by: INTERNAL MEDICINE

## 2024-02-22 PROCEDURE — 99214 OFFICE O/P EST MOD 30 MIN: CPT | Performed by: INTERNAL MEDICINE

## 2024-02-22 PROCEDURE — 3074F SYST BP LT 130 MM HG: CPT | Performed by: INTERNAL MEDICINE

## 2024-02-22 PROCEDURE — G8417 CALC BMI ABV UP PARAM F/U: HCPCS | Performed by: INTERNAL MEDICINE

## 2024-02-22 PROCEDURE — G8427 DOCREV CUR MEDS BY ELIG CLIN: HCPCS | Performed by: INTERNAL MEDICINE

## 2024-02-22 RX ORDER — PIRFENIDONE 801 MG/1
801 TABLET, FILM COATED ORAL 3 TIMES DAILY
Qty: 90 TABLET | Refills: 2 | Status: SHIPPED | OUTPATIENT
Start: 2024-02-22 | End: 2024-02-23 | Stop reason: SDUPTHER

## 2024-02-22 ASSESSMENT — ENCOUNTER SYMPTOMS
RESPIRATORY NEGATIVE: 1
GASTROINTESTINAL NEGATIVE: 1
EYES NEGATIVE: 1
ALLERGIC/IMMUNOLOGIC NEGATIVE: 1

## 2024-02-22 NOTE — TELEPHONE ENCOUNTER
Patient's wife called stating that the esbriet is too expensive. She states that it is around $2000.  She is wondering if there is anything else that her  can take.  Please advise.

## 2024-02-22 NOTE — PATIENT INSTRUCTIONS
ASSESSMENT/PLAN:  1. Pulmonary fibrosis (HCC)  2. Paralyzed hemidiaphragm  3. Gastroesophageal reflux disease without esophagitis  4. VESTA treated with BiPAP  5. Class 2 severe obesity due to excess calories with serious comorbidity and body mass index (BMI) of 35.0 to 35.9 in adult (HCC)    Paralyzed left diaphragm  Seen by ent but nothing wrong  PFt did not show flow volume loop obstructions  Has had problems with sob for sometime    Has been worked by by ENT, Cardiology    Had bronch which showed no problems    Has tried inhalers and still not helpiing    Has + sniff test for paralyzed Left diaphragm    Tried pulm rehab and didn't help   Spirometry shows moderate restrictive defect   Stridor is resolved          Went to [facility]- for evaluation of paralyzed left diaphragm and had surgery 8/29/2016  For pulling down the diaphragm    cxr done 9/20/21    LUNGS/PLEURA:  Chronic diffuse interstitial pulmonary fibrosis.  No acute interval change.            IMPRESSION:   1.  Chronic diffuse interstitial pulmonary fibrosis.   2.  No acute interval change.   SIGNED BY: Gorge Munoz MD on 9/20/2021  4:39 PM         Will need to continue and add more exercise          As tried, Dulera, advair, symbicort, sprivia, breo, anoro and nothing has helped with the breathing      Pulmonary fibrosis  Continue with:  Esbriet 801mg three times a day (still taking 3 tabs TID)  Will need to check liver function at least on a yearly basis  Last done 3/27/2023- normal  Oxygen at 2 lpm    [employer] money for esbriet is up and will need figure out how to get this    Will restart on this  Esbriet 801 mg TID, sent to pharmacy        GERD  Continue with  Omeprazole 40 mg twice a day     EGD done 2/8/2024  Esophagus: There was LA grade C esophagitis with distal esophagus there was a 1.5 cm ulceration that is also appreciated in this region.  Biopsies were taken of the distal esophagus to rule out viral pathologies.  Stomach:

## 2024-02-22 NOTE — PROGRESS NOTES
William J Hohweiler (:  1940) is a 83 y.o. male,Established patient, here for evaluation of the following chief complaint(s):  Follow-Up from Hospital (Patient would also like to discuss getting a new Bipap.)         ASSESSMENT/PLAN:  1. Pulmonary fibrosis (HCC)  2. Paralyzed hemidiaphragm  3. Gastroesophageal reflux disease without esophagitis  4. VESTA treated with BiPAP  5. Class 2 severe obesity due to excess calories with serious comorbidity and body mass index (BMI) of 35.0 to 35.9 in adult (HCC)    Paralyzed left diaphragm  Seen by ent but nothing wrong  PFt did not show flow volume loop obstructions  Has had problems with sob for sometime    Has been worked by by ENT, Cardiology    Had bronch which showed no problems    Has tried inhalers and still not helpiing    Has + sniff test for paralyzed Left diaphragm    Tried pulm rehab and didn't help   Spirometry shows moderate restrictive defect   Stridor is resolved          Went to [facility]- for evaluation of paralyzed left diaphragm and had surgery 2016  For pulling down the diaphragm    cxr done 21    LUNGS/PLEURA:  Chronic diffuse interstitial pulmonary fibrosis.  No acute interval change.            IMPRESSION:   1.  Chronic diffuse interstitial pulmonary fibrosis.   2.  No acute interval change.   SIGNED BY: Gorge Munoz MD on 2021  4:39 PM         Will need to continue and add more exercise          As tried, Dulera, advair, symbicort, sprivia, breo, anoro and nothing has helped with the breathing      Pulmonary fibrosis  Continue with:  Esbriet 801mg three times a day (still taking 3 tabs TID)  Will need to check liver function at least on a yearly basis  Last done 3/27/2023- normal  Oxygen at 2 lpm    [employer] money for esbriet is up and will need figure out how to get this    Will restart on this  Esbriet 801 mg TID, sent to pharmacy        GERD  Continue with  Omeprazole 40 mg twice a day     EGD done

## 2024-02-22 NOTE — PROGRESS NOTES
MA Communication:  The following orders are received by verbal communication from Espinoza Melara MD    Orders include:  orders to [facility] for new machine. Follow up   With NP in 3 months

## 2024-02-23 RX ORDER — PIRFENIDONE 801 MG/1
801 TABLET, FILM COATED ORAL 3 TIMES DAILY
Qty: 90 TABLET | Refills: 2 | Status: SHIPPED | OUTPATIENT
Start: 2024-02-23

## 2024-02-23 NOTE — TELEPHONE ENCOUNTER
Esbriet sent to [facility]. Please call Mukesh  to clarify that [facility]  cost of Esbriet or generic is too expensive, see previous message.

## 2024-02-27 ENCOUNTER — OFFICE VISIT (OUTPATIENT)
Dept: ORTHOPEDIC SURGERY | Age: 84
End: 2024-02-27
Payer: MEDICARE

## 2024-02-27 VITALS — BODY MASS INDEX: 34.09 KG/M2 | WEIGHT: 238.1 LBS | HEIGHT: 70 IN

## 2024-02-27 DIAGNOSIS — Z98.890 STATUS POST HIP SURGERY: Primary | ICD-10-CM

## 2024-02-27 PROCEDURE — 1111F DSCHRG MED/CURRENT MED MERGE: CPT | Performed by: ORTHOPAEDIC SURGERY

## 2024-02-27 PROCEDURE — 1123F ACP DISCUSS/DSCN MKR DOCD: CPT | Performed by: ORTHOPAEDIC SURGERY

## 2024-02-27 PROCEDURE — G8484 FLU IMMUNIZE NO ADMIN: HCPCS | Performed by: ORTHOPAEDIC SURGERY

## 2024-02-27 PROCEDURE — G8417 CALC BMI ABV UP PARAM F/U: HCPCS | Performed by: ORTHOPAEDIC SURGERY

## 2024-02-27 PROCEDURE — 99213 OFFICE O/P EST LOW 20 MIN: CPT | Performed by: ORTHOPAEDIC SURGERY

## 2024-02-27 PROCEDURE — G8427 DOCREV CUR MEDS BY ELIG CLIN: HCPCS | Performed by: ORTHOPAEDIC SURGERY

## 2024-02-27 PROCEDURE — 1036F TOBACCO NON-USER: CPT | Performed by: ORTHOPAEDIC SURGERY

## 2024-02-27 NOTE — PROGRESS NOTES
FOLLOW UP ORTHOPAEDIC NOTE    The patient follows up today for reevaluation of left hip.  Patient had undergone a CRPP left femoral neck fracture 3/20/2023.  Last follow-up was in August 31, 2023.  Patient states primarily lateral based pain when he has it.  He states pain can be 4-10/10 pain when it occurs.  Denies recent trauma or fall.    PE:  AAOx3  RR  Unlabored breathing  Skin warm and moist  Focused physical examination of the left hip  Nontender to palpation over the incision itself.  Very slight tenderness to palpation of the IT band mid thigh.  Well-healed surgical incision  Skin intact throughout  5/5 IP Q H TA G EHL  SILT DP SP LP MP S S  +2 DP pulse    Pertinent radiographs/imaging:  3 view left hip/femur 2/27/2024: No implant backing out or failure.  Healed femoral neck fracture.  Lateral view and remainder of the APs show good sphericity of the femoral head still however on 1 view there does seem to be either some overlap which makes it difficult to discern the inferior aspect of the femoral head given osteopenia correlating to previous CT scan in the summer 2023, I do not suspect a fracture off the inferior medial aspect of the femoral head which would be a very atypical injury.  Small heterotopic ossification within the inferior to the humeral head/neck-there is no gross cortical hypertrophy or elephants foot or stress fracture appreciated in femur     Diagnosis Orders   1. Status post hip surgery  XR HIP LEFT (2-3 VIEWS)        Assessment and plan: 83 male with continued subjective symptoms of left thigh pain with known, correlating diagnosis of left thigh tight IT band.  -Time of 16 minutes was spent coordinating and discussing the clinical findings and diagnostic imaging results as they pertain to the patient's presenting subjective symptoms.  -I took extensive time today to be able to review with the patient and his wife who accompanies him that his fracture has healed and he has no hardware

## 2024-02-28 ENCOUNTER — TELEPHONE (OUTPATIENT)
Dept: PULMONOLOGY | Age: 84
End: 2024-02-28

## 2024-02-28 NOTE — TELEPHONE ENCOUNTER
I attempted to contact Yuli at number she left this morning.  I went through all prompts and it would hang up on me at the end.  I tried this multiple times with no success.    [facility] and was transferred several times.  I finally s/w Disha.  She took down the info that it looks like this medication was approved through 12/31/24.  She is going to forward the information to Yuli and she said she would have Yuli call me back.

## 2024-02-28 NOTE — TELEPHONE ENCOUNTER
Additional Information Required  This medication or product was previously approved on [id number] from 2024-01-01 to 2024-12-31. **Please note: This request was submitted electronically. Formulary lowering, tiering exception, cost reduction and/or pre-benefit determination review (including prospective Medicare hospice reviews) requests cannot be requested using this method of submission. Providers contact us at [phone] for further assistance.

## 2024-02-28 NOTE — TELEPHONE ENCOUNTER
Yuli from [facility] called back and said she does see it was approved and no further action is needed.

## 2024-02-28 NOTE — TELEPHONE ENCOUNTER
Ann from [facility] called to get a prior authorization for pt's prednisone 801mg. Ann says it was started online at [email or web address] so it just needs to be completed there. Key code to continue is [id number]. Ann can be reached at [phone] ext [phone]

## 2024-02-29 ENCOUNTER — TELEPHONE (OUTPATIENT)
Dept: PULMONOLOGY | Age: 84
End: 2024-02-29

## 2024-02-29 NOTE — TELEPHONE ENCOUNTER
Roxane with [facility] called to inform us that pt's wife did not consent to copay for pirfenidone. Roxane was not able to offer grants before pt's wife disconnected. Roxane wanted to know if we can help get pt assistance and wanted to inform us that wife may be calling.     Roxane can be reached at [phone] ext [phone].

## 2024-03-01 ENCOUNTER — TELEPHONE (OUTPATIENT)
Dept: PULMONOLOGY | Age: 84
End: 2024-03-01

## 2024-03-01 NOTE — TELEPHONE ENCOUNTER
Dr. Melara is going to switch pt to OF from [facility] (due to high cost).    OFEV application was faxed to Dr. Melara and we are waiting for it to be signed and returned.     I also s/w wife and informed her Mukesh needs to come in and sign document.  Form is in my pending folder on my desk.    Once I see what strength is being ordered, I will do a PA and then send everything to OF.

## 2024-03-01 NOTE — TELEPHONE ENCOUNTER
Received a call from wife stating Esbriet is too expensive.  Dr. Melara is going to try patient on OFEV.  Forms filled out and waiting on signature.  See phone note from 2/22/24

## 2024-03-05 ENCOUNTER — TELEPHONE (OUTPATIENT)
Dept: PULMONOLOGY | Age: 84
End: 2024-03-05

## 2024-03-05 NOTE — TELEPHONE ENCOUNTER
Prior Auth Request Scanned in media.   [id number]   I have a previous approval however it is not in epic nor does the medication say it's been approved. It's still waiting response.   Please advise.    We have tried to reach you by [facility] email unsuccessfully.

## 2024-03-05 NOTE — TELEPHONE ENCOUNTER
I am not sure who did this PA, I had just received that fax.  There is a previous request with a key of [id number], when I try to pull that up there is no match on CMM.    Is there something else the office wants me to do?

## 2024-03-06 ENCOUNTER — TELEPHONE (OUTPATIENT)
Dept: PULMONOLOGY | Age: 84
End: 2024-03-06

## 2024-03-06 NOTE — TELEPHONE ENCOUNTER
Billy from [facility] stating the do not have any testing supporting the need for CPAP and pt is coming in 03.07.24 for an appointment with [facility]. PLease send updated testing results to [phone]. Thanks

## 2024-03-06 NOTE — TELEPHONE ENCOUNTER
PA for OFEV was DENIED.  Please review Denial letter (scanned in Media).  When doing the PA, I did not see in his chart where he had a lung biopsy and HRCT revealing idiopathic pulmonary fibrosis or probable idiopathic pulmonary fibrosis.  I sent all of your notes and testing that I had available to them to review.    If warranted, please compose appeal letter to be sent to [facility].      You must include the following:    Part D Appeal and Grievance Department  [address], [address]  [place] [address]  Fax:  [phone]  Patients name  Address  Member number: [id number]   Reference number:  [id number]  Reason for appeal and any evidence you wish to attach.    Once composed, letter and attachments need to be faxed to number listed above.

## 2024-03-06 NOTE — TELEPHONE ENCOUNTER
Please send appeal letter along with Dr. Melara's last note. He recently had CT chest 2/5/24, send this along with HRCT from 2014.

## 2024-03-07 NOTE — TELEPHONE ENCOUNTER
Mary from [facility] stating they did not have any sleep studies on the patient.    I called Evelin and sofy [place].  He is going to look back in the patients chart to see if they have any studies.  He will call me back once he is able to go through the whole chart.

## 2024-03-07 NOTE — TELEPHONE ENCOUNTER
I called [facility] and requested that they fax the patients sleep studies over to us as soon as possible.

## 2024-03-07 NOTE — TELEPHONE ENCOUNTER
Appeal letter and additional testing and notes printed.    Faxed appeal letter to Dr. Melara to sign.

## 2024-03-08 NOTE — TELEPHONE ENCOUNTER
I am not seeing any sleep study in his chart. Sent message to sleep lab asking them to see if they have anything.

## 2024-03-08 NOTE — ED PROVIDER NOTES
[facility]  ED  Emergency Department Encounter    Patient Name: William J Hohweiler  MRN: [id number]  YOB: 1940  Date of Evaluation: 2/5/2024  Provider: Jonah Payne MD  Note Started: 4:25 PM EST 2/5/24    CHIEF COMPLAINT  Chest Pain (Pt reports CP started last night after eating pizza with \"too much damn tomato sauce on it\". Pt reports taking tums without relief. )    SHARED SERVICE VISIT  I have seen and evaluated this patient with my supervising physician, Dr. Sears.     HISTORY OF PRESENT ILLNESS  William J Hohweiler is a 83 y.o. male who presents to the ED for evaluation of cough.  Patient states that he was at an outpatient appointment and they had concerns with his breathing and recommended that he come to the emergency department for evaluation.  Patient reports that he did develop some chest discomfort last evening in the lower chest.  States that it is worse when he is eating or drinking.  Does wear home oxygen and states that he does not feel short of breath.  Mild cough.  No productivity or hemoptysis.  He denies any fevers or chills.  States that he has had no headaches, lightheadedness or dizziness.  Denies leg pain or swelling.  States that he did have episode of nausea last night with vomiting.  No diarrhea or constipation.  No black or bloody stools.  Denies any urinary symptoms..    No other complaints, modifying factors or associated symptoms.     Nursing notes reviewed were all reviewed and agreed with or any disagreements were addressed in the HPI.    PMH:  Past Medical History:   Diagnosis Date    AAA (abdominal aortic aneurysm) (HCC)     Diabetes mellitus (HCC)     Hyperlipidemia     Hypertension     Pulmonary embolus (HCC)     Pulmonary fibrosis (HCC)      Surgical History:  Past Surgical History:   Procedure Laterality Date    CATARACT REMOVAL WITH IMPLANT      bilateral eyes    CERVICAL DISCECTOMY      and fusion (anterior)    CYST REMOVAL      from  rate and was having chest pain so was sent to ED for evaluation..    Chronic conditions affecting care:   AAA, diabetes, hyperlipidemia, hypertension, PE, long-term anticoagulant use.   has a past medical history of AAA (abdominal aortic aneurysm) (HCC), Diabetes mellitus (HCC), Hyperlipidemia, Hypertension, Pulmonary embolus (HCC), and Pulmonary fibrosis (HCC).    Social Determinants:   None identified.    Consults:   Case was discussed with the on-call vascular surgeon regarding patient's CTA results with chronic or with collateral and recommended heparin was initiated.  Case was also discussed with the hospitalist NP Cassidy Bonilla, regarding admission for further evaluation of chest pain and shortness of breath and orders have been placed.    Reassessment:      Patient did develop some pain after eating and was given dose of Pepcid and a GI cocktail.  Patient also received DuoNeb and Solu-Medrol for shortness of breath with improvement of symptoms.  Tachycardia has resolved.  Remainder vitals have been stable.    MDM/Disposition Considerations:   Briefly William J Hohweiler presents for chest pain and shortness of breath with postprandial pain.  CBC without leukocytosis or anemia.  CMP unremarkable without gross transaminitis.  Normal lipase.  Initial troponin of 11 with repeat of 9.  Rapid COVID and flu swabs were negative.  Patient was stable chest x-ray demonstrating no acute findings.  EKG sinus tachycardia.  No evidence for acute ischemic changes.  I did obtain a CTA of the chest abdomen and pelvis.  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  This was discussed with the on-call vascular surgeon who recommended heparin.  Case was also discussed with hospitalist regarding admission request for further evaluation of chest pain with high suspicion of cardiac etiology.  Admission orders have been  Clear

## 2024-03-08 NOTE — TELEPHONE ENCOUNTER
Patient's wife said he had it done at [facility]. States he has been there several times.

## 2024-03-08 NOTE — TELEPHONE ENCOUNTER
Wilmer from [facility] called back and said they do not have any sleep studies on the patient either.  Not sure how he passed this many years without having one.    I LVM for pt to call back to see if he remembers where he had the study done.  Waiting on a return call.

## 2024-03-15 NOTE — TELEPHONE ENCOUNTER
Still waiting on appeal determination.  Pt has not come in to sign form yet.  Once we receive a determination, I will call pt again to come in and sign form.

## 2024-03-18 ENCOUNTER — HOSPITAL ENCOUNTER (OUTPATIENT)
Dept: PHYSICAL THERAPY | Age: 84
Setting detail: THERAPIES SERIES
Discharge: HOME | End: 2024-03-18
Payer: MEDICARE

## 2024-03-18 PROCEDURE — 97110 THERAPEUTIC EXERCISES: CPT | Performed by: PHYSICAL THERAPIST

## 2024-03-18 PROCEDURE — 97161 PT EVAL LOW COMPLEX 20 MIN: CPT | Performed by: PHYSICAL THERAPIST

## 2024-03-18 NOTE — PLAN OF CARE
[facility]- Outpatient [facility]  [address], [place] [address] office: [phone] fax: [phone]     Physical Therapy Initial Evaluation Certification      Dear Gorge Packer MD,    We had the pleasure of evaluating the following patient for physical therapy services at [facility].  A summary of our findings can be found in the initial assessment below.  This includes our plan of care.  If you have any questions or concerns regarding these findings, please do not hesitate to contact me at the office phone number listed above.  Thank you for the referral.     Physician Signature:_______________________________Date:__________________  By signing above (or electronic signature), therapist’s plan is approved by physician       Physical Therapy: TREATMENT/PROGRESS NOTE   Patient: William J Hohweiler (83 y.o. male)   Examination Date: 2024   :  1940 MRN: [id number]   Visit #: 1   Insurance Allowable Auth Needed   bmn []Yes    [x]No    Insurance: Payor: MEDICARE / Plan: MEDICARE PART A AND B / Product Type: *No Product type* /   Insurance ID: [id number] - (Medicare)  Secondary Insurance (if applicable):    Treatment Diagnosis:   Left hip pain M25.552   Medical Diagnosis:  Other specified postprocedural states [Z98.890] s/p Left hip pinning.     Referring Physician: Gorge Packer MD  PCP: Jonah Payne MD       Plan of care signed (Y/N):     Date of Patient follow up with Physician:      Progress Report/POC: EVAL today  POC update due: (10 visits /OR AUTH LIMITS, whichever is less)  2024                                             Precautions/ Contra-indications:           Latex allergy:  YES  Pacemaker:    NO  Contraindications for Manipulation: None  Date of Surgery: 3/28/23  Other:    Red Flags:  None    C-SSRS Triggered by Intake questionnaire:   [x] No, Questionnaire did not trigger screening.   []

## 2024-03-22 NOTE — TELEPHONE ENCOUNTER
I called [facility] and s/w Coleen.  She said the OFEV appeal was approved from 3/4/24-12/31/24.    I called and s/w with pts wife.  She said he will come in on Monday to sign form.  In my folder.

## 2024-04-01 ENCOUNTER — HOSPITAL ENCOUNTER (OUTPATIENT)
Dept: PHYSICAL THERAPY | Age: 84
Setting detail: THERAPIES SERIES
Discharge: HOME | End: 2024-04-01
Payer: MEDICARE

## 2024-04-01 PROCEDURE — 97140 MANUAL THERAPY 1/> REGIONS: CPT | Performed by: PHYSICAL THERAPIST

## 2024-04-01 PROCEDURE — 97110 THERAPEUTIC EXERCISES: CPT | Performed by: PHYSICAL THERAPIST

## 2024-04-01 NOTE — FLOWSHEET NOTE
10 weeks for the following treatment interventions:    Interventions:  [x] Therapeutic exercise including: strength training, ROM, including postural re-education.   [x] NMR activation and proprioception, including postural re-education.    [x] Manual therapy as indicated to include: PROM, Gr I-IV mobilizations, and STM  [x] Modalities as needed that may include: Cryotherapy  [x] Patient education on joint protection, postural re-education, activity modification, progression of HEP.        [] Aquatic Therapy    Plan: Cont POC- Continue emphasis/focus on exercise progression and increasing ROM. Next visit plan to progress weights and progress reps     Electronically Signed by Chanel Garcia, PT  Date: 04/01/2024     Note: Portions of this note have been templated and/or copied from initial evaluation, reassessments and prior notes for documentation efficiency.

## 2024-04-02 ENCOUNTER — ANESTHESIA EVENT (OUTPATIENT)
Dept: ENDOSCOPY | Age: 84
End: 2024-04-02
Payer: MEDICARE

## 2024-04-03 ENCOUNTER — ANESTHESIA (OUTPATIENT)
Dept: ENDOSCOPY | Age: 84
End: 2024-04-03
Payer: MEDICARE

## 2024-04-03 ENCOUNTER — HOSPITAL ENCOUNTER (OUTPATIENT)
Age: 84
Setting detail: OUTPATIENT SURGERY
Discharge: HOME | End: 2024-04-03
Attending: INTERNAL MEDICINE | Admitting: INTERNAL MEDICINE
Payer: MEDICARE

## 2024-04-03 VITALS
WEIGHT: 242 LBS | TEMPERATURE: 98.3 F | OXYGEN SATURATION: 100 % | HEART RATE: 88 BPM | DIASTOLIC BLOOD PRESSURE: 81 MMHG | BODY MASS INDEX: 34.65 KG/M2 | RESPIRATION RATE: 18 BRPM | SYSTOLIC BLOOD PRESSURE: 128 MMHG | HEIGHT: 70 IN

## 2024-04-03 LAB
GLUCOSE BLD-MCNC: 80 MG/DL (ref 70–99)
GLUCOSE BLD-MCNC: 83 MG/DL (ref 70–99)
PERFORMED ON: NORMAL
PERFORMED ON: NORMAL

## 2024-04-03 PROCEDURE — 2580000003 HC RX 258: Performed by: ANESTHESIOLOGY

## 2024-04-03 PROCEDURE — 2500000003 HC RX 250 WO HCPCS: Performed by: NURSE ANESTHETIST, CERTIFIED REGISTERED

## 2024-04-03 PROCEDURE — 6360000002 HC RX W HCPCS: Performed by: NURSE ANESTHETIST, CERTIFIED REGISTERED

## 2024-04-03 PROCEDURE — 3700000000 HC ANESTHESIA ATTENDED CARE: Performed by: INTERNAL MEDICINE

## 2024-04-03 PROCEDURE — 2709999900 HC NON-CHARGEABLE SUPPLY: Performed by: INTERNAL MEDICINE

## 2024-04-03 PROCEDURE — 3609017100 HC EGD: Performed by: INTERNAL MEDICINE

## 2024-04-03 PROCEDURE — 7100000011 HC PHASE II RECOVERY - ADDTL 15 MIN: Performed by: INTERNAL MEDICINE

## 2024-04-03 PROCEDURE — 7100000010 HC PHASE II RECOVERY - FIRST 15 MIN: Performed by: INTERNAL MEDICINE

## 2024-04-03 RX ORDER — SODIUM CHLORIDE, SODIUM LACTATE, POTASSIUM CHLORIDE, CALCIUM CHLORIDE 600; 310; 30; 20 MG/100ML; MG/100ML; MG/100ML; MG/100ML
INJECTION, SOLUTION INTRAVENOUS CONTINUOUS
Status: DISCONTINUED | OUTPATIENT
Start: 2024-04-03 | End: 2024-04-03 | Stop reason: HOSPADM

## 2024-04-03 RX ORDER — SODIUM CHLORIDE 9 MG/ML
INJECTION, SOLUTION INTRAVENOUS PRN
Status: DISCONTINUED | OUTPATIENT
Start: 2024-04-03 | End: 2024-04-03 | Stop reason: HOSPADM

## 2024-04-03 RX ORDER — SODIUM CHLORIDE 0.9 % (FLUSH) 0.9 %
5-40 SYRINGE (ML) INJECTION PRN
Status: DISCONTINUED | OUTPATIENT
Start: 2024-04-03 | End: 2024-04-03 | Stop reason: HOSPADM

## 2024-04-03 RX ORDER — PROPOFOL 10 MG/ML
INJECTION, EMULSION INTRAVENOUS PRN
Status: DISCONTINUED | OUTPATIENT
Start: 2024-04-03 | End: 2024-04-03 | Stop reason: SDUPTHER

## 2024-04-03 RX ORDER — NALOXONE HYDROCHLORIDE 0.4 MG/ML
INJECTION, SOLUTION INTRAMUSCULAR; INTRAVENOUS; SUBCUTANEOUS PRN
Status: DISCONTINUED | OUTPATIENT
Start: 2024-04-03 | End: 2024-04-03 | Stop reason: HOSPADM

## 2024-04-03 RX ORDER — ONDANSETRON 2 MG/ML
4 INJECTION INTRAMUSCULAR; INTRAVENOUS EVERY 30 MIN PRN
Status: DISCONTINUED | OUTPATIENT
Start: 2024-04-03 | End: 2024-04-03 | Stop reason: HOSPADM

## 2024-04-03 RX ORDER — SODIUM CHLORIDE 0.9 % (FLUSH) 0.9 %
5-40 SYRINGE (ML) INJECTION EVERY 12 HOURS SCHEDULED
Status: DISCONTINUED | OUTPATIENT
Start: 2024-04-03 | End: 2024-04-03 | Stop reason: HOSPADM

## 2024-04-03 RX ORDER — LIDOCAINE HYDROCHLORIDE 10 MG/ML
1 INJECTION, SOLUTION EPIDURAL; INFILTRATION; INTRACAUDAL; PERINEURAL
Status: DISCONTINUED | OUTPATIENT
Start: 2024-04-03 | End: 2024-04-03 | Stop reason: HOSPADM

## 2024-04-03 RX ORDER — LIDOCAINE HYDROCHLORIDE 20 MG/ML
INJECTION, SOLUTION EPIDURAL; INFILTRATION; INTRACAUDAL; PERINEURAL PRN
Status: DISCONTINUED | OUTPATIENT
Start: 2024-04-03 | End: 2024-04-03 | Stop reason: SDUPTHER

## 2024-04-03 RX ORDER — MIDAZOLAM HYDROCHLORIDE 1 MG/ML
2 INJECTION INTRAMUSCULAR; INTRAVENOUS
Status: DISCONTINUED | OUTPATIENT
Start: 2024-04-03 | End: 2024-04-03 | Stop reason: HOSPADM

## 2024-04-03 RX ADMIN — SODIUM CHLORIDE, POTASSIUM CHLORIDE, SODIUM LACTATE AND CALCIUM CHLORIDE: 600; 310; 30; 20 INJECTION, SOLUTION INTRAVENOUS at 11:03

## 2024-04-03 RX ADMIN — LIDOCAINE HYDROCHLORIDE 50 MG: 20 INJECTION, SOLUTION EPIDURAL; INFILTRATION; INTRACAUDAL; PERINEURAL at 12:10

## 2024-04-03 RX ADMIN — PROPOFOL 50 MG: 10 INJECTION, EMULSION INTRAVENOUS at 12:10

## 2024-04-03 ASSESSMENT — PAIN - FUNCTIONAL ASSESSMENT
PAIN_FUNCTIONAL_ASSESSMENT: NONE - DENIES PAIN
PAIN_FUNCTIONAL_ASSESSMENT: 0-10
PAIN_FUNCTIONAL_ASSESSMENT: 0-10

## 2024-04-03 NOTE — H&P
[facility]   Pre[facility]operative History and Physical    Patient: William J Hohweiler  : 1940  Acct#:     HISTORY OF PRESENT ILLNESS:    The patient is a 83 y.o. male who presents for recent upper endoscopy with esophageal ulceration EGD to assess healing.    Indications: Esophageal ulcer    Past Medical History:        Diagnosis Date    AAA (abdominal aortic aneurysm) (HCC)     Acid reflux     Arthritis     Congenital absence of one kidney     Diabetes mellitus (HCC)     Hyperlipidemia     Hypertension     On home oxygen therapy     2L continuously    Paralyzed hemidiaphragm     Pulmonary embolus (HCC)     Pulmonary fibrosis (HCC)     Sleep apnea     uses bipap      Past Surgical History:        Procedure Laterality Date    CATARACT REMOVAL WITH IMPLANT      bilateral eyes    CERVICAL DISCECTOMY      and fusion (anterior)    CYST REMOVAL      from the buttocks    FEMORAL BYPASS      bi-femoral bypass    HERNIA REPAIR      x 2    HIP SURGERY Left 2023    LEFT HIP PINNING performed by G[facility]acker [facility] at [facility]    JOINT REPLACEMENT      bilateral knee replacement    NASAL SEPTUM SURGERY      SHOULDER SURGERY Left     RCR    TESTICLE REMOVAL      left    TOE SURGERY Bilateral     bunion    UPPER GASTROINTESTINAL ENDOSCOPY N/A 2024    EGD BIOPSY performed by Earl Glez MD at [facility] ENDOSCOPY      Medications Prior to Admission:   No current facility-administered medications on file prior to encounter.     Current Outpatient Medications on File Prior to Encounter   Medication Sig Dispense Refill    ondansetron (ZOFRAN-ODT) 4 MG disintegrating tablet Take 1 tablet by mouth every 8 hours as needed for Nausea or Vomiting 20 tablet 0    omeprazole (PRILOSEC) 40 MG delayed release capsule Take 1 capsule by mouth in the morning and at bedtime 90 capsule 1    alendronate (FOSAMAX) 70 MG tablet Take 1 tablet by mouth every 7 days (Patient not taking: Reported on 4/3/2024)      pioglitazone

## 2024-04-03 NOTE — DISCHARGE INSTRUCTIONS
sign in to your [employer] account. Enter J454 in the Search Health Information box to learn more about \"Upper GI Endoscopy: What to Expect at Home.\"     If you do not have an account, please click on the \"Sign Up Now\" link.  Current as of: May 12, 2017  Content Version: 11.6  © 5544-8398 [employer]. Care instructions adapted under license by [employer]. If you have questions about a medical condition or this instruction, always ask your healthcare professional. [employer] disclaims any warranty or liability for your use of this information.

## 2024-04-03 NOTE — ANESTHESIA PRE PROCEDURE
Department of Anesthesiology  Preprocedure Note       Name:  William J Hohweiler   Age:  83 y.o.  :  1940                                          MRN:  [id number]         Date:  4/3/2024      Surgeon: Surgeon(s):  Earl Glez MD    Procedure: Procedure(s):  ESOPHAGOGASTRODUODENOSCOPY    Medications prior to admission:   Prior to Admission medications    Medication Sig Start Date End Date Taking? Authorizing Provider   ondansetron (ZOFRAN-ODT) 4 MG disintegrating tablet Take 1 tablet by mouth every 8 hours as needed for Nausea or Vomiting 24   Alan Lopez MD   omeprazole (PRILOSEC) 40 MG delayed release capsule Take 1 capsule by mouth in the morning and at bedtime 24   Alan Lopez MD   alendronate (FOSAMAX) 70 MG tablet Take 1 tablet by mouth every 7 days 24   Daniel Doty MD   pioglitazone (ACTOS) 45 MG tablet Take 1 tablet by mouth daily 24   Daniel Doty MD   traZODone (DESYREL) 50 MG tablet Take 1 tablet by mouth nightly 24   Daniel Doty MD   diclofenac sodium (VOLTAREN) 1 % GEL Apply 4 g topically 4 times daily as needed for Pain 23   Gorge Packer MD   acetaminophen (TYLENOL) 325 MG tablet Take 2 tablets by mouth in the morning and 2 tablets at noon and 2 tablets in the evening and 2 tablets before bedtime. 3/29/23   Luana Aly PA   apixaban (ELIQUIS) 2.5 MG TABS tablet Take 1 tablet by mouth 2 times daily 3/29/23   Luana Aly PA   gabapentin (NEURONTIN) 300 MG capsule Take 1 capsule by mouth every evening.    Daniel Doty MD   celecoxib (CELEBREX) 200 MG capsule Take 1 capsule by mouth 2 times daily    Daniel Doty MD   Insulin Glargine (TOUJEO SOLOSTAR SC) Inject 30 Units into the skin in the morning and at bedtime    Daniel Doty MD   nortriptyline (PAMELOR) 50 MG capsule Take 1 capsule by mouth nightly 10/26/22   Daniel Doty MD   zolpidem (AMBIEN) 10 MG tablet Take 1 tablet

## 2024-04-03 NOTE — PROGRESS NOTES
Mukesh J Flakitohweiler    Age 83 y.o.    male    1940    MRN [id number]    4/3/2024  Arrival Time_____________  OR Time____________30 Min     Procedure(s):  ESOPHAGOGASTRODUODENOSCOPY                      General    Surgeon(s):  Newton, Earl N, MD       Phone [phone] ([place])     Initals  Date  Info Source  Home  Cell         Work  _____________________________________________________________________  _____________________________________________________________________  _____________________________________________________________________  _____________________________________________________________________  _____________________________________________________________________    PCP _____________________________ Phone_________________     H&P  ________________  Bringing      Chart              Epic      DOS      Called________  EKG ________________   Bringing      Chart              Epic      DOS      Called________  LABS________________   Bringing     Chart              Epic      DOS      Called________  Cardiac Clearance ______ Bringing      Chart              Epic      DOS      Called________  Pulmonary Clearance____ Bringing      Chart              Epic      DOS      Called________    Cardiologist________________________ Phone___________________________  Pulmonologist_______________________Phone___________________________    ? Advance Directives   ? [facility] concerns / Waiver on Chart            PAT Communications________________  ? Pre-op Instructions Given /Understood          _________________________________  ? Directions to Surgery Center                          _________________________________  ? Transportation Home_______________      __________________________________  ? Crutches/Walker__________________        __________________________________    Orders: Hard copy/ EPIC                 Transcribed/ EPIC              _______Wt.    ________Pharmacy                         _______SCD            
POCT      Blood Glucose:83      (Normal Range 70-99)  
Pt advanced from lying to sitting at side of bed without adverse events: VSS/RESP WNL  abd assessment unchanged- denies pain /tolerating oral liquids without ponv    Physician  out to see pt and family and discuss exam results with family /significant other/pts family verbalized understanding of postop activity restrictions -pt/family denies additional questions    Reviewed discharge instructions with family  And pt  /verbalized understanding - all questions /concerns addressed  Educational materials given to pt and explained- all questions answered    Pain level =0  
Wife to bedside updated with no questions. Discharge instructions reviewed with patient and responsible adult. Discharge instructions  given with no additional questions. Patient to be discharged home with belongings.   
Please notify your surgeon if you experience dizziness, shortness of breath or blurred vision between now & the time of your surgery  To provide excellent care visitors will be limited to two per room at any given time. No visitors under the age of 14.  If you use oxygen and have a portable tank please bring it with you the DOS  For your convenience [facility] has a pharmacy on site to fill your prescriptions.     *Please call pre admission testing if you have any further questions             Jose Luis      [phone]                            Address: [address] [place] [address]     When you pull into the hospital and are looking at the main hospital entrance, turn right.   We are a tan building to the right of the main entrance.   [facility] over the door.  .                                                        Revision History

## 2024-04-03 NOTE — ANESTHESIA POSTPROCEDURE EVALUATION
Department of Anesthesiology  Postprocedure Note    Patient: William J Hohweiler  MRN: [id number]  YOB: 1940  Date of evaluation: 4/3/2024    Procedure Summary       Date: 04/03/24 Room / Location: [place] [address] / [facility]    Anesthesia Start: 1204 Anesthesia Stop: 1217    Procedure: ESOPHAGOGASTRODUODENOSCOPY Diagnosis:       Esophagitis      Dysphagia, unspecified type      (Esophagitis [K20.90])      (Dysphagia, unspecified type [R13.10])    Surgeons: Earl Glez MD Responsible Provider: Thomas Perez MD    Anesthesia Type: MAC ASA Status: 3            Anesthesia Type: No value filed.    Hipolito Phase I: Hipolito Score: 10    Hipolito Phase II: Hipolito Score: 10    Anesthesia Post Evaluation    Patient location during evaluation: PACU  Level of consciousness: awake  Airway patency: patent  Nausea & Vomiting: no vomiting  Cardiovascular status: blood pressure returned to baseline  Respiratory status: acceptable  Hydration status: stable  Pain management: adequate    No notable events documented.

## 2024-04-11 ENCOUNTER — HOSPITAL ENCOUNTER (OUTPATIENT)
Dept: PHYSICAL THERAPY | Age: 84
Setting detail: THERAPIES SERIES
Discharge: HOME | End: 2024-04-11
Payer: MEDICARE

## 2024-04-11 PROCEDURE — 97110 THERAPEUTIC EXERCISES: CPT | Performed by: PHYSICAL THERAPIST

## 2024-04-11 PROCEDURE — 97140 MANUAL THERAPY 1/> REGIONS: CPT | Performed by: PHYSICAL THERAPIST

## 2024-04-11 NOTE — FLOWSHEET NOTE
[facility]- Outpatient Rehabilitation and Therapy  [address], [place] [address] office: [phone] fax: [phone]     Physical Therapy Initial Evaluation Certification      Dear Gorge Packer MD,    We had the pleasure of evaluating the following patient for physical therapy services at [facility].  A summary of our findings can be found in the initial assessment below.  This includes our plan of care.  If you have any questions or concerns regarding these findings, please do not hesitate to contact me at the office phone number listed above.  Thank you for the referral.     Physician Signature:_______________________________Date:__________________  By signing above (or electronic signature), therapist’s plan is approved by physician       Physical Therapy: TREATMENT/PROGRESS NOTE   Patient: William J Hohweiler (83 y.o. male)   Examination Date: 2024   :  1940 MRN: [id number]   Visit #: 3   Insurance Allowable Auth Needed   bmn []Yes    [x]No    Insurance: Payor: MEDICARE / Plan: MEDICARE PART A AND B / Product Type: *No Product type* /   Insurance ID: [id number] - (Medicare)  Secondary Insurance (if applicable):    Treatment Diagnosis:   Left hip pain M25.552   Medical Diagnosis:  Other specified postprocedural states [Z98.890] s/p Left hip pinning.     Referring Physician: Gorge Packer MD  PCP: Jonah Payne MD       Plan of care signed (Y/N):     Date of Patient follow up with Physician:      Progress Report/POC: NO  POC update due: (10 visits /OR AUTH LIMITS, whichever is less)  2024                                             Precautions/ Contra-indications:           Latex allergy:  YES  Pacemaker:    NO  Contraindications for Manipulation: None  Date of Surgery: 3/28/23  Other:    Red Flags:  None    C-SSRS Triggered by Intake questionnaire:   [x] No, Questionnaire did not trigger screening.   [] Yes,

## 2024-04-12 ENCOUNTER — APPOINTMENT (OUTPATIENT)
Dept: PHYSICAL THERAPY | Age: 84
End: 2024-04-12
Payer: MEDICARE

## 2024-04-18 ENCOUNTER — HOSPITAL ENCOUNTER (OUTPATIENT)
Dept: PHYSICAL THERAPY | Age: 84
Setting detail: THERAPIES SERIES
Discharge: HOME | End: 2024-04-18
Payer: MEDICARE

## 2024-04-18 PROCEDURE — 97110 THERAPEUTIC EXERCISES: CPT | Performed by: PHYSICAL THERAPIST

## 2024-04-18 PROCEDURE — 97140 MANUAL THERAPY 1/> REGIONS: CPT | Performed by: PHYSICAL THERAPIST

## 2024-04-18 NOTE — FLOWSHEET NOTE
[facility]- Outpatient Rehabilitation and Therapy  [address], [place] [address] office: [phone] fax: [phone]           Physical Therapy: TREATMENT/PROGRESS NOTE   Patient: William J Hohweiler (83 y.o. male)   Examination Date: 2024   :  1940 MRN: [id number]   Visit #: 4   Insurance Allowable Auth Needed   bmn []Yes    [x]No    Insurance: Payor: MEDICARE / Plan: MEDICARE PART A AND B / Product Type: *No Product type* /   Insurance ID: [id number] - (Medicare)  Secondary Insurance (if applicable):    Treatment Diagnosis:   Left hip pain M25.552   Medical Diagnosis:  Other specified postprocedural states [Z98.890] s/p Left hip pinning.     Referring Physician: Gorge Packer MD  PCP: Jonah Payne MD       Plan of care signed (Y/N):     Date of Patient follow up with Physician:      Progress Report/POC: NO  POC update due: (10 visits /OR AUTH LIMITS, whichever is less)  2024                                             Precautions/ Contra-indications:           Latex allergy:  YES  Pacemaker:    NO  Contraindications for Manipulation: None  Date of Surgery: 3/28/23  Other:    Red Flags:  None    C-SSRS Triggered by Intake questionnaire:   [x] No, Questionnaire did not trigger screening.   [] Yes, Patient intake triggered further evaluation      [] C-SSRS Screening completed  [] PCP notified via Plan of Care  [] Emergency services notified     Preferred Language for Healthcare:   [x] English       [] other:    SUBJECTIVE EXAMINATION     Patient stated complaint: Patient reports hip is painful if sits certain way.  Painful getting in/out of car.  Pain and caution with sitting down on toilet  Feels he can walk better and has less pain with walking.  Feels stiff with walking after prolonged sitting.  Pain lateral hip to knee with sitting.         Test used Initial score  3/18/24 2024   Pain Summary VAS 6/10/10    Functional questionnaire LEFS 21

## 2024-04-25 ENCOUNTER — HOSPITAL ENCOUNTER (OUTPATIENT)
Dept: PHYSICAL THERAPY | Age: 84
Setting detail: THERAPIES SERIES
Discharge: HOME | End: 2024-04-25
Payer: MEDICARE

## 2024-04-25 PROCEDURE — 97112 NEUROMUSCULAR REEDUCATION: CPT | Performed by: PHYSICAL THERAPIST

## 2024-04-25 PROCEDURE — 97110 THERAPEUTIC EXERCISES: CPT | Performed by: PHYSICAL THERAPIST

## 2024-04-25 NOTE — FLOWSHEET NOTE
weekly - 2 sets - 10 reps - 5 hold  - Supine Hip Adduction Isometric with Ball  - 2 x daily - 7 x weekly - 2 sets - 10 reps - 5 hold  - Supine Knee Extension Strengthening  - 2 x daily - 7 x weekly - 2 sets - 10 reps - 2 hold  - Seated Long Arc Quad  - 2 x daily - 7 x weekly - 2 sets - 10 reps - 2 hold  - Seated Heel Toe Raises  - 2 x daily - 7 x weekly - 2 sets - 10 reps - 2 hold  - Standing Hip Abduction with Counter Support  - 2 x daily - 7 x weekly - 1 sets - 10 reps  - Heel Toe Raises with Counter Support  - 2 x daily - 7 x weekly - 1 sets - 10 reps  - Mini Squat with Counter Support  - 2 x daily - 7 x weekly - 1 sets - 10 reps  ASSESSMENT     Today's Assessment: During therapy this date, patient required progression of exercises and program and manual interventions for improving proper muscle recruitment and activation/motor control patterns and increasing ROM.Patient will continue to benefit from ongoing evaluation and advanced clinical decision from a Physical Therapist to address and improve pain control, ROM, and muscle strength to safely return to PLOF without symptoms or restrictions.  Continues to be greatly challenged with hip abduction strengthening.  Fatigues quickly in standing positions, difficulty progressing standing strength/ balance.  Limited due to oxygen/ breathing.  Incorporated roller to ITB to help with soft tissue restriction.  Patient would like to incorporate leg machines in future visits.      Medical Necessity Documentation:  I certify that this patient meets the below criteria necessary for medical necessity for care and/or justification of therapy services:  The patient has a musculoskeletal condition(s) with a corresponding ICD-10 code that is of complexity and severity that require skilled therapeutic intervention. This has a direct and significant impact on the need for therapy and significantly impacts the rate of recovery.     Return to Play: NA    Prognosis for POC: [x]

## 2024-04-25 NOTE — FLOWSHEET NOTE
Resistance  - 2 x daily - 7 x weekly - 2 sets - 10 reps - 5 hold  - Supine Hip Adduction Isometric with Ball  - 2 x daily - 7 x weekly - 2 sets - 10 reps - 5 hold  - Supine Knee Extension Strengthening  - 2 x daily - 7 x weekly - 2 sets - 10 reps - 2 hold  - Seated Long Arc Quad  - 2 x daily - 7 x weekly - 2 sets - 10 reps - 2 hold  - Seated Heel Toe Raises  - 2 x daily - 7 x weekly - 2 sets - 10 reps - 2 hold  - Standing Hip Abduction with Counter Support  - 2 x daily - 7 x weekly - 1 sets - 10 reps  - Heel Toe Raises with Counter Support  - 2 x daily - 7 x weekly - 1 sets - 10 reps  - Mini Squat with Counter Support  - 2 x daily - 7 x weekly - 1 sets - 10 reps  ASSESSMENT     Today's Assessment: Patient had good tolerance to today's session, reporting appropriate fatigue and challenge with program completed. Able to progress  exercise difficulty and repetitions on SL clams and adding leg machines. Continues to display deficits in tightness, stiffness, weakness, and decreased balance control which required ongoing skilled physical therapy and decision making.    Pt. Only required min rest breaks in between ex. And had good tolerance to addition of leg machines.    Medical Necessity Documentation:  I certify that this patient meets the below criteria necessary for medical necessity for care and/or justification of therapy services:  The patient has a musculoskeletal condition(s) with a corresponding ICD-10 code that is of complexity and severity that require skilled therapeutic intervention. This has a direct and significant impact on the need for therapy and significantly impacts the rate of recovery.     Return to Play: NA    Prognosis for POC: [x] Good [] Fair  [] Poor      Patient requires continued skilled intervention: [x] Yes  [] No      CHARGE CAPTURE     PT CHARGE GRID   CPT Code (TIMED) minutes # CPT Code (UNTIMED) #     Therex (21677)  30 2  EVAL:LOW (64281 - Typically 20 minutes face-to-face)

## 2024-05-07 ENCOUNTER — TELEPHONE (OUTPATIENT)
Dept: PULMONOLOGY | Age: 84
End: 2024-05-07

## 2024-05-07 DIAGNOSIS — J84.10 PULMONARY FIBROSIS (HCC): Primary | Chronic | ICD-10-CM

## 2024-05-07 NOTE — TELEPHONE ENCOUNTER
Please call Mukesh and ensure he was able to start on Esbriet for his pulmonary fibrosis.  He was also to have liver testing completed. I do not see where this was completed.  Please have him get his blood drawn a few days prior to upcoming appointment.

## 2024-05-08 ENCOUNTER — TELEPHONE (OUTPATIENT)
Dept: PULMONOLOGY | Age: 84
End: 2024-05-08

## 2024-05-08 RX ORDER — NINTEDANIB 150 MG/1
150 CAPSULE ORAL 2 TIMES DAILY
Qty: 60 CAPSULE | Refills: 5 | Status: SHIPPED | OUTPATIENT
Start: 2024-05-08

## 2024-05-08 NOTE — TELEPHONE ENCOUNTER
Spoke to Rosalia, Spouse.  She is aware script has been sent to [facility] today for OFEV and they should be receiving this soon.  She verbalized understanding and thanked for care.

## 2024-05-08 NOTE — TELEPHONE ENCOUNTER
Patient's wife Rosalia call and would like a call from Gianna RUSESLL to talk about a medication prescribe for her  (she didn't specified what medication) .

## 2024-05-08 NOTE — TELEPHONE ENCOUNTER
Spoke with spouse.  Mukesh has not received his approved OFEV as of yet.  I have resent this to [facility].  Please ensure pharmacy received script and is filling.  He will also get repeat liver function test prior to starting.

## 2024-05-14 ENCOUNTER — HOSPITAL ENCOUNTER (OUTPATIENT)
Dept: PHYSICAL THERAPY | Age: 84
Setting detail: THERAPIES SERIES
Discharge: HOME | End: 2024-05-14
Payer: MEDICARE

## 2024-05-14 PROCEDURE — 97110 THERAPEUTIC EXERCISES: CPT | Performed by: PHYSICAL THERAPIST

## 2024-05-14 PROCEDURE — 97140 MANUAL THERAPY 1/> REGIONS: CPT | Performed by: PHYSICAL THERAPIST

## 2024-05-14 NOTE — FLOWSHEET NOTE
Isometric with Ball  - 2 x daily - 7 x weekly - 2 sets - 10 reps - 5 hold  - Supine Knee Extension Strengthening  - 2 x daily - 7 x weekly - 2 sets - 10 reps - 2 hold  - Seated Long Arc Quad  - 2 x daily - 7 x weekly - 2 sets - 10 reps - 2 hold  - Seated Heel Toe Raises  - 2 x daily - 7 x weekly - 2 sets - 10 reps - 2 hold  - Standing Hip Abduction with Counter Support  - 2 x daily - 7 x weekly - 1 sets - 10 reps  - Heel Toe Raises with Counter Support  - 2 x daily - 7 x weekly - 1 sets - 10 reps  - Mini Squat with Counter Support  - 2 x daily - 7 x weekly - 1 sets - 10 reps  ASSESSMENT     Today's Assessment: Patient had good tolerance to today's session, reporting appropriate fatigue and challenge with program completed. Able to progress  exercise difficulty and repetitions on SL clams and adding leg machines. Continues to display deficits in tightness, stiffness, weakness, and decreased balance control which required ongoing skilled physical therapy and decision making.    Pt had improved tolerance to manuals.   Pt's knee and hip ROM were improved from initial visit.   Pt did need breaks from exercise, but his break times were shorter.  Pt was able to increase Wbing activity    Medical Necessity Documentation:  I certify that this patient meets the below criteria necessary for medical necessity for care and/or justification of therapy services:  The patient has a musculoskeletal condition(s) with a corresponding ICD-10 code that is of complexity and severity that require skilled therapeutic intervention. This has a direct and significant impact on the need for therapy and significantly impacts the rate of recovery.     Return to Play: NA    Prognosis for POC: [x] Good [] Fair  [] Poor      Patient requires continued skilled intervention: [x] Yes  [] No      CHARGE CAPTURE     PT CHARGE GRID   CPT Code (TIMED) minutes # CPT Code (UNTIMED) #     Therex (93207)  30 2  EVAL:LOW (88132 - Typically 20 minutes

## 2024-05-17 ENCOUNTER — HOSPITAL ENCOUNTER (OUTPATIENT)
Dept: PHYSICAL THERAPY | Age: 84
Setting detail: THERAPIES SERIES
Discharge: HOME | End: 2024-05-17
Payer: MEDICARE

## 2024-05-17 PROCEDURE — 97140 MANUAL THERAPY 1/> REGIONS: CPT | Performed by: PHYSICAL THERAPIST

## 2024-05-17 PROCEDURE — 97110 THERAPEUTIC EXERCISES: CPT | Performed by: PHYSICAL THERAPIST

## 2024-05-17 NOTE — FLOWSHEET NOTE
[facility]- Outpatient Rehabilitation and Therapy  [address], [place] [address] office: [phone] fax: [phone]           Physical Therapy: TREATMENT/PROGRESS NOTE   Patient: William J Hohweiler (83 y.o. male)   Examination Date: 2024   :  1940 MRN: [id number]   Visit #: 7   Insurance Allowable Auth Needed   bmn []Yes    [x]No    Insurance: Payor: MEDICARE / Plan: MEDICARE PART A AND B / Product Type: *No Product type* /   Insurance ID: [id number] - (Medicare)  Secondary Insurance (if applicable):    Treatment Diagnosis:   Left hip pain M25.552   Medical Diagnosis:  Other specified postprocedural states [Z98.890] s/p Left hip pinning.     Referring Physician: Gorge Packer MD  PCP: Jonah Payne MD       Plan of care signed (Y/N):     Date of Patient follow up with Physician:      Progress Report/POC: NO  POC update due: (10 visits /OR AUTH LIMITS, whichever is less)  2024                                             Precautions/ Contra-indications:           Latex allergy:  YES  Pacemaker:    NO  Contraindications for Manipulation: None  Date of Surgery: 3/28/23  Other:    Red Flags:  None    C-SSRS Triggered by Intake questionnaire:   [x] No, Questionnaire did not trigger screening.   [] Yes, Patient intake triggered further evaluation      [] C-SSRS Screening completed  [] PCP notified via Plan of Care  [] Emergency services notified     Preferred Language for Healthcare:   [x] English       [] other:    SUBJECTIVE EXAMINATION     Patient stated complaint: Pt feels he is getting stronger.  Getting up from chair can be difficult.   Re eval for POC  NV       Test used Initial score  3/18/24 2024   Pain Summary VAS 6/10/10    Functional questionnaire LEFS 21 =74% 28 = 65%   Other:                OBJECTIVE EXAMINATION     3/18/24  ROM/Strength: (Blank cells denote NT)      Mvmt (norm) PROM L PROM R Notes            LUMBAR Flex (90)       Ext

## 2024-05-21 ENCOUNTER — HOSPITAL ENCOUNTER (OUTPATIENT)
Dept: PHYSICAL THERAPY | Age: 84
Setting detail: THERAPIES SERIES
Discharge: HOME | End: 2024-05-21
Payer: MEDICARE

## 2024-05-21 PROCEDURE — 97140 MANUAL THERAPY 1/> REGIONS: CPT | Performed by: PHYSICAL THERAPIST

## 2024-05-21 PROCEDURE — 97110 THERAPEUTIC EXERCISES: CPT | Performed by: PHYSICAL THERAPIST

## 2024-05-21 NOTE — PLAN OF CARE
[] Met: [] Not Met: [] Adjusted  Pt to improve strength to show motor control/activation of proximal hip, TrA/abdominal, and posterior chain LE to facilitate functional mobility and ADLs.    Status: [x] Progressing: [] Met: [] Not Met: [] Adjusted  Patient will return to  walking around house, putting shoes/socks on, getting in/out of a car, and stand for length of time  without increased symptoms or restriction to work towards return to prior level of function.        Status: [x] Progressing: [] Met: [] Not Met: [] Adjusted  Patient will increase LE function to allow independence in all self-care activities.   Patient will resume normal work/leisure activities without pain.            Status: [x] Progressing: [] Met: [] Not Met: [] Adjusted    TREATMENT PLAN     Frequency/Duration: 1-2x/week for 10 weeks for the following treatment interventions:    Interventions:  [x] Therapeutic exercise including: strength training, ROM, including postural re-education.   [x] NMR activation and proprioception, including postural re-education.    [x] Manual therapy as indicated to include: PROM, Gr I-IV mobilizations, and STM  [x] Modalities as needed that may include: Cryotherapy  [x] Patient education on joint protection, postural re-education, activity modification, progression of HEP.        [] Aquatic Therapy    Plan: Return to 1-2 x a week program to address gait, balance, strength, and endurance.     Electronically Signed by Chanel Garcia, PT, MSPT, OMT-C [id number]    Date: 05/21/2024     Note: Portions of this note have been templated and/or copied from initial evaluation, reassessments and prior notes for documentation efficiency.

## 2024-05-23 ENCOUNTER — OFFICE VISIT (OUTPATIENT)
Dept: PULMONOLOGY | Age: 84
End: 2024-05-23
Payer: MEDICARE

## 2024-05-23 VITALS
OXYGEN SATURATION: 93 % | RESPIRATION RATE: 20 BRPM | DIASTOLIC BLOOD PRESSURE: 62 MMHG | SYSTOLIC BLOOD PRESSURE: 139 MMHG | TEMPERATURE: 97.7 F | HEART RATE: 108 BPM

## 2024-05-23 DIAGNOSIS — J84.10 PULMONARY FIBROSIS (HCC): Primary | Chronic | ICD-10-CM

## 2024-05-23 DIAGNOSIS — J96.11 CHRONIC RESPIRATORY FAILURE WITH HYPOXIA (HCC): ICD-10-CM

## 2024-05-23 DIAGNOSIS — G47.33 OSA TREATED WITH BIPAP: ICD-10-CM

## 2024-05-23 DIAGNOSIS — J98.4 RESTRICTIVE LUNG DISEASE: ICD-10-CM

## 2024-05-23 DIAGNOSIS — J98.6 PARALYZED HEMIDIAPHRAGM: ICD-10-CM

## 2024-05-23 PROCEDURE — G8417 CALC BMI ABV UP PARAM F/U: HCPCS | Performed by: NURSE PRACTITIONER

## 2024-05-23 PROCEDURE — 3078F DIAST BP <80 MM HG: CPT | Performed by: NURSE PRACTITIONER

## 2024-05-23 PROCEDURE — G8427 DOCREV CUR MEDS BY ELIG CLIN: HCPCS | Performed by: NURSE PRACTITIONER

## 2024-05-23 PROCEDURE — 1036F TOBACCO NON-USER: CPT | Performed by: NURSE PRACTITIONER

## 2024-05-23 PROCEDURE — 3075F SYST BP GE 130 - 139MM HG: CPT | Performed by: NURSE PRACTITIONER

## 2024-05-23 PROCEDURE — 1123F ACP DISCUSS/DSCN MKR DOCD: CPT | Performed by: NURSE PRACTITIONER

## 2024-05-23 PROCEDURE — 99214 OFFICE O/P EST MOD 30 MIN: CPT | Performed by: NURSE PRACTITIONER

## 2024-05-23 RX ORDER — IBUPROFEN 600 MG/1
600 TABLET ORAL 4 TIMES DAILY PRN
COMMUNITY
Start: 2019-12-16

## 2024-05-23 RX ORDER — TRAMADOL HYDROCHLORIDE 50 MG/1
TABLET ORAL
COMMUNITY
Start: 2024-03-27

## 2024-05-23 RX ORDER — SUCRALFATE ORAL 1 G/10ML
SUSPENSION ORAL
COMMUNITY
Start: 2024-05-08

## 2024-05-23 ASSESSMENT — SLEEP AND FATIGUE QUESTIONNAIRES
HOW LIKELY ARE YOU TO NOD OFF OR FALL ASLEEP WHILE SITTING AND READING: WOULD NEVER DOZE
ESS TOTAL SCORE: 1
HOW LIKELY ARE YOU TO NOD OFF OR FALL ASLEEP WHILE LYING DOWN TO REST IN THE AFTERNOON WHEN CIRCUMSTANCES PERMIT: WOULD NEVER DOZE
HOW LIKELY ARE YOU TO NOD OFF OR FALL ASLEEP WHEN YOU ARE A PASSENGER IN A CAR FOR AN HOUR WITHOUT A BREAK: WOULD NEVER DOZE
HOW LIKELY ARE YOU TO NOD OFF OR FALL ASLEEP WHILE SITTING INACTIVE IN A PUBLIC PLACE: WOULD NEVER DOZE
HOW LIKELY ARE YOU TO NOD OFF OR FALL ASLEEP IN A CAR, WHILE STOPPED FOR A FEW MINUTES IN TRAFFIC: WOULD NEVER DOZE
HOW LIKELY ARE YOU TO NOD OFF OR FALL ASLEEP WHILE WATCHING TV: SLIGHT CHANCE OF DOZING
HOW LIKELY ARE YOU TO NOD OFF OR FALL ASLEEP WHILE SITTING QUIETLY AFTER LUNCH WITHOUT ALCOHOL: WOULD NEVER DOZE
HOW LIKELY ARE YOU TO NOD OFF OR FALL ASLEEP WHILE SITTING AND TALKING TO SOMEONE: WOULD NEVER DOZE

## 2024-05-23 ASSESSMENT — ENCOUNTER SYMPTOMS
SORE THROAT: 0
WHEEZING: 0
RHINORRHEA: 0
CHEST TIGHTNESS: 0
COUGH: 0
EYE PAIN: 0
ABDOMINAL PAIN: 0
SHORTNESS OF BREATH: 1

## 2024-05-23 NOTE — PROGRESS NOTES
MA Communication:  The following orders are received by verbal communication from   Gianna Scott CNP    Orders include:    3 MONTH VESTA F/U W/DR TEMI ABRAMS    
     Thought Content: Thought content normal.         Judgment: Judgment normal.          Assessment/Plan:     1. Pulmonary fibrosis (HCC)  -     Hepatic Function Panel; Standing  2. Restrictive lung disease  3. VESTA treated with BiPAP  4. Chronic respiratory failure with hypoxia (HCC)  5. Paralyzed hemidiaphragm  6. Class 1 obesity due to excess calories with serious comorbidity and body mass index (BMI) of 34.0 to 34.9 in adult     Prior pulmonary OV note, PFT report and recent chest imaging report reviewed.  He is to start on Ofev for his fibrosis, liver function is normal.  He will have repeat liver function testing at 1 month and 2 months use to ensure no abnormality with starting OFEV.    I have personally reviewed and summarized the old records and/or obtained further history from someone other than the patient.     Discussed when to call with worsening symptoms such as increased shortness of breath, productive cough, wheezing or symptoms not responding to treatment plan.        William J Hohweiler has good benefit and adherence on PAP therapy.  Compliance report information was analyzed to assess complexity and medical decision making in regards to further testing and management.  Will prescribe home medical equipment company to check pressures, download usage and will replace mask, tubing, disposables and filters as needed.       Instructed to wash or wipe face of excess oil before using CPAP to prevent the mask / nasal pillow from sliding and ensure proper fit.  Empty the water daily and allow the chamber and tubings to air dry. Instructed how to properly clean the device to prevent bacteria and mold growth in the water chamber.       Advised to avoid driving if too sleepy to function safely and given a discussion of the risks of untreated apneas such as accidents, cognitive impairment, mood impairment, worsening high blood pressure, various cardiac disease and stroke.      Regarding obesity, recommend

## 2024-05-23 NOTE — PATIENT INSTRUCTIONS
Repeat Hepatic panel in 30 and 60 days - Call after testing completed to review results    Start  Ofev twice a day    Continue oxygen therapy, benefiting from use.      Call with worsening symptoms such as increased shortness of breath, productive cough, wheezing or symptoms not responding to treatment plan.     William J Hohweiler has good benefit and adherence on PAP therapy.  Compliance report information was analyzed to assess complexity and medical decision making in regards to further testing and management.  Will prescribe home medical equipment company to check pressures, download usage and will replace mask, tubing, disposables and filters as needed.       Instructed to wash or wipe face of excess oil before using CPAP to prevent the mask / nasal pillow from sliding and ensure proper fit.  Empty the water daily and allow the chamber and tubings to air dry. Instructed how to properly clean the device to prevent bacteria and mold growth in the water chamber.       Advised to avoid driving if too sleepy to function safely and given a discussion of the risks of untreated apneas such as accidents, cognitive impairment, mood impairment, worsening high blood pressure, various cardiac disease and stroke.      Regarding obesity, recommend trying a formal program and/or increase physical activity by adding a 30 minute walk to daily routine. Weight loss was encouraged as a long term approach to treatment of VESTA. Explained the correlation between obesity and apnea and the causative role it can play.    Follow up 3 months  or sooner for any issues.      Remember to bring a list of pulmonary medications and any CPAP or BiPAP machines to your next appointment with the office.     Please keep all of your future appointments scheduled by [facility] office. Out of respect for other patients and providers, you may be asked to reschedule your appointment if you arrive later than your scheduled

## 2024-05-24 ENCOUNTER — HOSPITAL ENCOUNTER (OUTPATIENT)
Dept: PHYSICAL THERAPY | Age: 84
Setting detail: THERAPIES SERIES
Discharge: HOME | End: 2024-05-24
Payer: MEDICARE

## 2024-05-24 PROCEDURE — 97140 MANUAL THERAPY 1/> REGIONS: CPT | Performed by: PHYSICAL THERAPIST

## 2024-05-24 PROCEDURE — 97110 THERAPEUTIC EXERCISES: CPT | Performed by: PHYSICAL THERAPIST

## 2024-05-24 NOTE — FLOWSHEET NOTE
[facility] (87006)     Other:    Other:    Total Timed Code Tx Minutes 41 3       Total Treatment Minutes 41        Charge Justification:  (27935) THERAPEUTIC EXERCISE - Provided verbal/tactile cueing for activities related to strengthening, flexibility, endurance, ROM performed to prevent loss of range of motion, maintain or improve muscular strength or increase flexibility, following either an injury or surgery.   (98112) HOME EXERCISE PROGRAM - Reviewed/Progressed HEP activities related to strengthening, flexibility, endurance, ROM performed to prevent loss of range of motion, maintain or improve muscular strength or increase flexibility, following either an injury or surgery.  (32233) MANUAL THERAPY -  Manual therapy techniques, 1 or more regions, each 15 minutes (Mobilization/manipulation, manual lymphatic drainage, manual traction) for the purpose of modulating pain, promoting relaxation,  increasing ROM, reducing/eliminating soft tissue swelling/inflammation/restriction, improving soft tissue extensibility and allowing for proper ROM for normal function with self care, mobility, lifting and ambulation      GOALS     Patient stated goal: Ease pain.  Status: [] Progressing: [] Met: [x] Not Met: [] Adjusted    Therapist goals for Patient:   Short Term Goals: To be achieved in: 2 weeks  Independent in HEP and progression per patient tolerance, in order to progress toward full function and prevent re-injury.    Status: [] Progressing: [x] Met: [] Not Met: [] Adjusted  Patient will have a decrease in pain to 2/10 to help facilitate improvement in movement, function, and ADLs as indicated by functional deficits.   Status: [x] Progressing: [] Met: [] Not Met: [] Adjusted    Long Term Goals: To be achieved in: 10 weeks  Disability index score of 40% or less for the LEFS to assist with return top prior level of function.   Status: [x] Progressing: [] Met: [] Not Met: [] Adjusted  Improve ROM to WNL to allow

## 2024-05-28 ENCOUNTER — HOSPITAL ENCOUNTER (OUTPATIENT)
Dept: PHYSICAL THERAPY | Age: 84
Setting detail: THERAPIES SERIES
Discharge: HOME | End: 2024-05-28
Payer: MEDICARE

## 2024-05-28 PROCEDURE — 97140 MANUAL THERAPY 1/> REGIONS: CPT | Performed by: PHYSICAL THERAPIST

## 2024-05-28 PROCEDURE — 97110 THERAPEUTIC EXERCISES: CPT | Performed by: PHYSICAL THERAPIST

## 2024-05-28 NOTE — FLOWSHEET NOTE
[facility]- Outpatient Rehabilitation and Therapy  [address], [place] [address] office: [phone] fax: [phone]         Physical Therapy: TREATMENT/PROGRESS NOTE   Patient: William J Hohweiler (83 y.o. male)   Examination Date: 2024   :  1940 MRN: [id number]   Visit #: 10   Insurance Allowable Auth Needed   bmn []Yes    [x]No    Insurance: Payor: MEDICARE / Plan: MEDICARE PART A AND B / Product Type: *No Product type* /   Insurance ID: [id number] - (Medicare)  Secondary Insurance (if applicable):    Treatment Diagnosis:   Left hip pain M25.552   Medical Diagnosis:  Other specified postprocedural states [Z98.890] s/p Left hip pinning.     Referring Physician: Gorge Packer MD  PCP: Jonah Payne MD   Physical Therapy Re-Certification Plan of Care    Dear Gorge Packer MD  ,    We had the pleasure of treating the following patient for physical therapy services at [facility]. A summary of our findings can be found in the updated assessment below.  This includes our plan of care.  If you have any questions or concerns regarding these findings, please do not hesitate to contact me at the office phone number checked above.  Thank you for the referral.     Physician Signature:________________________________Date:__________________  By signing above (or electronic signature), therapist's plan is approved by physician      Functional Outcome: LEFS  28 = 65%  William J Hohweiler 1940 continues to present with functional deficits in strength symmetry and endurance of strength  limiting ability with walking on even ground and transitions between positions .  During therapy this date, patient required progression of exercises and program and manual interventions for exercise progression and modulating pain. Patient will continue to benefit from ongoing evaluation and advanced clinical decision from a Physical Therapist to

## 2024-05-31 ENCOUNTER — HOSPITAL ENCOUNTER (OUTPATIENT)
Dept: PHYSICAL THERAPY | Age: 84
Setting detail: THERAPIES SERIES
Discharge: HOME | End: 2024-05-31
Payer: MEDICARE

## 2024-05-31 PROCEDURE — 97140 MANUAL THERAPY 1/> REGIONS: CPT | Performed by: PHYSICAL THERAPIST

## 2024-05-31 PROCEDURE — 97110 THERAPEUTIC EXERCISES: CPT | Performed by: PHYSICAL THERAPIST

## 2024-05-31 NOTE — FLOWSHEET NOTE
[facility]- Outpatient Rehabilitation and Therapy  [address], [place] [address] office: [phone] fax: [phone]         Physical Therapy: TREATMENT/PROGRESS NOTE   Patient: William J Hohweiler (83 y.o. male)   Examination Date: 2024   :  1940 MRN: [id number]   Visit #: 11   Insurance Allowable Auth Needed   bmn []Yes    [x]No    Insurance: Payor: MEDICARE / Plan: MEDICARE PART A AND B / Product Type: *No Product type* /   Insurance ID: [id number] - (Medicare)  Secondary Insurance (if applicable):    Treatment Diagnosis:   Left hip pain M25.552   Medical Diagnosis:  Other specified postprocedural states [Z98.890] s/p Left hip pinning.     Referring Physician: Gorge Packer MD  PCP: Jonah Payne MD   Physical Therapy Re-Certification Plan of Care    Dear Gorge Packer MD  ,    We had the pleasure of treating the following patient for physical therapy services at [facility]. A summary of our findings can be found in the updated assessment below.  This includes our plan of care.  If you have any questions or concerns regarding these findings, please do not hesitate to contact me at the office phone number checked above.  Thank you for the referral.     Physician Signature:________________________________Date:__________________  By signing above (or electronic signature), therapist's plan is approved by physician      Functional Outcome: LEFS  28 = 65%  William J Hohweiler 1940 continues to present with functional deficits in strength symmetry and endurance of strength  limiting ability with walking on even ground and transitions between positions .  During therapy this date, patient required progression of exercises and program and manual interventions for exercise progression and modulating pain. Patient will continue to benefit from ongoing evaluation and advanced clinical decision from a Physical Therapist to

## 2024-06-07 ENCOUNTER — HOSPITAL ENCOUNTER (OUTPATIENT)
Dept: PHYSICAL THERAPY | Age: 84
Setting detail: THERAPIES SERIES
Discharge: HOME | End: 2024-06-07
Payer: MEDICARE

## 2024-06-07 PROCEDURE — 97140 MANUAL THERAPY 1/> REGIONS: CPT | Performed by: PHYSICAL THERAPIST

## 2024-06-07 PROCEDURE — 97110 THERAPEUTIC EXERCISES: CPT | Performed by: PHYSICAL THERAPIST

## 2024-06-07 NOTE — FLOWSHEET NOTE
4+      Extension 3+ 5             ANKLE  DF 4- 5      PF        Inversion        Eversion        Exercises/Interventions     Therapeutic Ex (98753)  resistance Sets/time Reps Notes/Cues/Progressions           hep    hep    hep    hep   Bridges  20  x      LAQ 7.5# 2 X 10  Pain full extenion on left; hep   SL hip abd  2 x 10   With mod assist   Clamshells Left  2 x 10                LTR  10  x          Standing Hip abd 4#  1 x 20 bilat    Standing march 4# X 20 maged         ALYSON 8 inch   X 10  left    Lateral side step up 4 in X 10         Leg press 100# 3 x 10 Small range   Manual Intervention (18959)  TIME     PROM right knee/  right hip  2 min     Man gastroc / HS stretch  3  min     Roller lateral left hip 6 min     Assisted hip flexion stretch             NMR re-education (64893) resistance Sets/time Reps CUES NEEDED   Tandem balance  nv     Lateral side stepping  2 x                           Therapeutic Activity (63389)  Sets/time                                          Modalities:    No modalities applied this session    Education/Home Exercise Program: Patient HEP program created electronically.  Refer to charity: water access code:    Access Code: [email or web address]  URL: [email or web address]  Date: 04/01/2024  Prepared by: Chanel Garcia    Exercises  - Supine Gluteal Sets  - 2 x daily - 7 x weekly - 1 sets - 10 reps - 5 hold  - Supine Single Bent Knee Fallout  - 2 x daily - 7 x weekly - 1 sets - 10 reps - 5 hold  - Hooklying Clamshell with Resistance  - 2 x daily - 7 x weekly - 2 sets - 10 reps - 5 hold  - Supine Hip Adduction Isometric with Ball  - 2 x daily - 7 x weekly - 2 sets - 10 reps - 5 hold  - Supine Knee Extension Strengthening  - 2 x daily - 7 x weekly - 2 sets - 10 reps - 2 hold  - Seated Long Arc Quad  - 2 x daily - 7 x weekly - 2 sets - 10 reps - 2 hold  - Seated Heel Toe Raises  - 2 x daily - 7 x weekly - 2 sets - 10 reps - 2 hold  - Standing Hip Abduction with Counter Support  - 2 x daily - 7 x

## 2024-06-11 ENCOUNTER — HOSPITAL ENCOUNTER (OUTPATIENT)
Dept: PHYSICAL THERAPY | Age: 84
Setting detail: THERAPIES SERIES
Discharge: HOME | End: 2024-06-11
Payer: MEDICARE

## 2024-06-11 PROCEDURE — 97140 MANUAL THERAPY 1/> REGIONS: CPT | Performed by: PHYSICAL THERAPIST

## 2024-06-11 PROCEDURE — 97110 THERAPEUTIC EXERCISES: CPT | Performed by: PHYSICAL THERAPIST

## 2024-06-11 NOTE — FLOWSHEET NOTE
[facility]- Outpatient Rehabilitation and Therapy  [address], [place] [address] office: [phone] fax: [phone]         Physical Therapy: TREATMENT/PROGRESS NOTE   Patient: William J Hohweiler (83 y.o. male)   Examination Date: 2024   :  1940 MRN: [id number]   Visit #: 13   Insurance Allowable Auth Needed   bmn []Yes    [x]No    Insurance: Payor: MEDICARE / Plan: MEDICARE PART A AND B / Product Type: *No Product type* /   Insurance ID: [id number] - (Medicare)  Secondary Insurance (if applicable):    Treatment Diagnosis:   Left hip pain M25.552   Medical Diagnosis:  Other specified postprocedural states [Z98.890] s/p Left hip pinning.     Referring Physician: Gorge Packer MD  PCP: Jonah Payne MD   Physical Therapy Re-Certification Plan of Care    Dear Gorge Packer MD  ,    We had the pleasure of treating the following patient for physical therapy services at [facility]. A summary of our findings can be found in the updated assessment below.  This includes our plan of care.  If you have any questions or concerns regarding these findings, please do not hesitate to contact me at the office phone number checked above.  Thank you for the referral.     Physician Signature:________________________________Date:__________________  By signing above (or electronic signature), therapist's plan is approved by physician      Functional Outcome: LEFS  28 = 65%  William J Hohweiler 1940 continues to present with functional deficits in strength symmetry and endurance of strength  limiting ability with walking on even ground and transitions between positions .  During therapy this date, patient required progression of exercises and program and manual interventions for exercise progression and modulating pain. Patient will continue to benefit from ongoing evaluation and advanced clinical decision from a Physical Therapist to

## 2024-06-13 ENCOUNTER — OFFICE VISIT (OUTPATIENT)
Dept: ORTHOPEDIC SURGERY | Age: 84
End: 2024-06-13
Payer: MEDICARE

## 2024-06-13 VITALS — WEIGHT: 242 LBS | BODY MASS INDEX: 34.65 KG/M2 | HEIGHT: 70 IN

## 2024-06-13 DIAGNOSIS — M54.50 CHRONIC BILATERAL LOW BACK PAIN WITHOUT SCIATICA: ICD-10-CM

## 2024-06-13 DIAGNOSIS — Z98.890 STATUS POST HIP SURGERY: ICD-10-CM

## 2024-06-13 DIAGNOSIS — M70.62 TROCHANTERIC BURSITIS OF LEFT HIP: Primary | ICD-10-CM

## 2024-06-13 DIAGNOSIS — G89.29 CHRONIC BILATERAL LOW BACK PAIN WITHOUT SCIATICA: ICD-10-CM

## 2024-06-13 PROCEDURE — G8417 CALC BMI ABV UP PARAM F/U: HCPCS | Performed by: ORTHOPAEDIC SURGERY

## 2024-06-13 PROCEDURE — 99213 OFFICE O/P EST LOW 20 MIN: CPT | Performed by: ORTHOPAEDIC SURGERY

## 2024-06-13 PROCEDURE — 1036F TOBACCO NON-USER: CPT | Performed by: ORTHOPAEDIC SURGERY

## 2024-06-13 PROCEDURE — 20610 DRAIN/INJ JOINT/BURSA W/O US: CPT | Performed by: ORTHOPAEDIC SURGERY

## 2024-06-13 PROCEDURE — G8427 DOCREV CUR MEDS BY ELIG CLIN: HCPCS | Performed by: ORTHOPAEDIC SURGERY

## 2024-06-13 PROCEDURE — 1123F ACP DISCUSS/DSCN MKR DOCD: CPT | Performed by: ORTHOPAEDIC SURGERY

## 2024-06-13 RX ORDER — TRIAMCINOLONE ACETONIDE 40 MG/ML
40 INJECTION, SUSPENSION INTRA-ARTICULAR; INTRAMUSCULAR ONCE
Status: COMPLETED | OUTPATIENT
Start: 2024-06-13 | End: 2024-06-13

## 2024-06-13 RX ORDER — ROPIVACAINE HYDROCHLORIDE 5 MG/ML
4 INJECTION, SOLUTION EPIDURAL; INFILTRATION; PERINEURAL ONCE
Status: COMPLETED | OUTPATIENT
Start: 2024-06-13 | End: 2024-06-13

## 2024-06-13 RX ADMIN — TRIAMCINOLONE ACETONIDE 40 MG: 40 INJECTION, SUSPENSION INTRA-ARTICULAR; INTRAMUSCULAR at 15:31

## 2024-06-13 RX ADMIN — ROPIVACAINE HYDROCHLORIDE 4 ML: 5 INJECTION, SOLUTION EPIDURAL; INFILTRATION; PERINEURAL at 15:31

## 2024-06-13 NOTE — PROGRESS NOTES
6/13/2024     Diagnosis Orders   1. Trochanteric bursitis of left hip  GA ARTHROCENTESIS ASPIR&/INJ MAJOR JT/BURSA W/O US    ROPivacaine (NAROPIN) 0.5% injection 4 mL    triamcinolone acetonide (KENALOG-40) injection 40 mg      2. Status post hip surgery        3. Chronic bilateral low back pain without sciatica  XR LUMBAR SPINE (2-3 VIEWS)    Jostin Price PA,  Orthopedic Surgery (Spine), [facility]        Assessment and plan: 83 male with continued subjective symptoms of left hip pain with known, correlating diagnosis of left hip trochanteric bursitis status post left hip CRPP.  -Time of 16 minutes was spent coordinating and discussing the clinical findings and diagnostic imaging results as they pertain to the patient's presenting subjective symptoms.  -I had a pleasant discussion with the patient today.  I did review with him that his radiographs without as much superimposition do show suspected inferior medial femoral head fragmentation.  The main fracture line has healed.  This is encouraging and he also denies any significant reproducible groin pain and he states that he is able to get up and walk for prolonged periods.  This is encouraging as he has been doing well in physical therapy  -He does have low back pain and while he denies radicular component he states that he does feel that both his legs are weak after prolonged standing.  Formal physical therapy will continue however a referral was offered to see our spine team and he wishes to pursue this and this was placed  -I did review with the patient that we attempted not to necessarily inject around previous hardware so as to limit the risk of infection.  He is looking for some form of pain relief as he has failed all other conservative care treatment options.  I then offered a corticosteroid injection to the greater trochanteric bursa.  I did review with him that there is a potential risk of infection as the hardware is present there.

## 2024-06-14 ENCOUNTER — HOSPITAL ENCOUNTER (OUTPATIENT)
Dept: PHYSICAL THERAPY | Age: 84
Setting detail: THERAPIES SERIES
Discharge: HOME | End: 2024-06-14
Payer: MEDICARE

## 2024-06-14 PROCEDURE — 97110 THERAPEUTIC EXERCISES: CPT | Performed by: PHYSICAL THERAPIST

## 2024-06-14 NOTE — FLOWSHEET NOTE
[facility]- Outpatient Rehabilitation and Therapy  [address], [place] [address] office: [phone] fax: [phone]         Physical Therapy: TREATMENT/PROGRESS NOTE   Patient: William J Hohweiler (83 y.o. male)   Examination Date: 2024   :  1940 MRN: [id number]   Visit #: 14   Insurance Allowable Auth Needed   bmn []Yes    [x]No    Insurance: Payor: MEDICARE / Plan: MEDICARE PART A AND B / Product Type: *No Product type* /   Insurance ID: [id number] - (Medicare)  Secondary Insurance (if applicable):    Treatment Diagnosis:   Left hip pain M25.552   Medical Diagnosis:  Other specified postprocedural states [Z98.890] s/p Left hip pinning.     Referring Physician: Gorge Packer MD  PCP: Jonah Payne MD   Physical Therapy Re-Certification Plan of Care    Dear Gorge Packer MD  ,    We had the pleasure of treating the following patient for physical therapy services at [facility]. A summary of our findings can be found in the updated assessment below.  This includes our plan of care.  If you have any questions or concerns regarding these findings, please do not hesitate to contact me at the office phone number checked above.  Thank you for the referral.     Physician Signature:________________________________Date:__________________  By signing above (or electronic signature), therapist's plan is approved by physician      Functional Outcome: LEFS  28 = 65%  William J Hohweiler 1940 continues to present with functional deficits in strength symmetry and endurance of strength  limiting ability with walking on even ground and transitions between positions .  During therapy this date, patient required progression of exercises and program and manual interventions for exercise progression and modulating pain. Patient will continue to benefit from ongoing evaluation and advanced clinical decision from a Physical Therapist to

## 2024-06-18 ENCOUNTER — HOSPITAL ENCOUNTER (OUTPATIENT)
Dept: PHYSICAL THERAPY | Age: 84
Setting detail: THERAPIES SERIES
Discharge: HOME | End: 2024-06-18
Payer: MEDICARE

## 2024-06-18 PROCEDURE — 97110 THERAPEUTIC EXERCISES: CPT | Performed by: PHYSICAL THERAPIST

## 2024-06-18 NOTE — FLOWSHEET NOTE
[facility]- Outpatient Rehabilitation and Therapy  [address], [place] [address] office: [phone] fax: [phone]         Physical Therapy: TREATMENT/PROGRESS NOTE   Patient: William J Hohweiler (83 y.o. male)   Examination Date: 2024   :  1940 MRN: [id number]   Visit #: 15   Insurance Allowable Auth Needed   bmn []Yes    [x]No    Insurance: Payor: MEDICARE / Plan: MEDICARE PART A AND B / Product Type: *No Product type* /   Insurance ID: [id number] - (Medicare)  Secondary Insurance (if applicable):    Treatment Diagnosis:   Left hip pain M25.552   Medical Diagnosis:  Other specified postprocedural states [Z98.890] s/p Left hip pinning.     Referring Physician: Gorge Packer MD  PCP: Jonah Payne MD   Physical Therapy Re-Certification Plan of Care    Dear Gorge Packer MD  ,    We had the pleasure of treating the following patient for physical therapy services at [facility]. A summary of our findings can be found in the updated assessment below.  This includes our plan of care.  If you have any questions or concerns regarding these findings, please do not hesitate to contact me at the office phone number checked above.  Thank you for the referral.     Physician Signature:________________________________Date:__________________  By signing above (or electronic signature), therapist's plan is approved by physician      Functional Outcome: LEFS  28 = 65%  William J Hohweiler 1940 continues to present with functional deficits in strength symmetry and endurance of strength  limiting ability with walking on even ground and transitions between positions .  During therapy this date, patient required progression of exercises and program and manual interventions for exercise progression and modulating pain. Patient will continue to benefit from ongoing evaluation and advanced clinical decision from a Physical Therapist to

## 2024-06-21 ENCOUNTER — HOSPITAL ENCOUNTER (OUTPATIENT)
Dept: PHYSICAL THERAPY | Age: 84
Setting detail: THERAPIES SERIES
Discharge: HOME | End: 2024-06-21
Payer: MEDICARE

## 2024-06-21 PROCEDURE — 97110 THERAPEUTIC EXERCISES: CPT | Performed by: PHYSICAL THERAPIST

## 2024-06-21 NOTE — FLOWSHEET NOTE
level of function.        Status: [x] Progressing: [] Met: [] Not Met: [] Adjusted  Patient will increase LE function to allow independence in all self-care activities.   Patient will resume normal work/leisure activities without pain.            Status: [x] Progressing: [] Met: [] Not Met: [] Adjusted    TREATMENT PLAN     Frequency/Duration: 1-2x/week for 10 weeks for the following treatment interventions:    Interventions:  [x] Therapeutic exercise including: strength training, ROM, including postural re-education.   [x] NMR activation and proprioception, including postural re-education.    [x] Manual therapy as indicated to include: PROM, Gr I-IV mobilizations, and STM  [x] Modalities as needed that may include: Cryotherapy  [x] Patient education on joint protection, postural re-education, activity modification, progression of HEP.        [] Aquatic Therapy    Plan: Return to 1-2 x a week program to address gait, balance, strength, and endurance.     Electronically Signed by Chanel Garcia, PT, MSPT, OMT-C [id number]    Date: 06/21/2024     Note: Portions of this note have been templated and/or copied from initial evaluation, reassessments and prior notes for documentation efficiency.

## 2024-06-25 ENCOUNTER — HOSPITAL ENCOUNTER (OUTPATIENT)
Dept: PHYSICAL THERAPY | Age: 84
Setting detail: THERAPIES SERIES
Discharge: HOME | End: 2024-06-25
Payer: MEDICARE

## 2024-06-25 PROCEDURE — 97110 THERAPEUTIC EXERCISES: CPT | Performed by: PHYSICAL THERAPIST

## 2024-06-26 ENCOUNTER — OFFICE VISIT (OUTPATIENT)
Dept: ORTHOPEDIC SURGERY | Age: 84
End: 2024-06-26
Payer: MEDICARE

## 2024-06-26 VITALS — WEIGHT: 242 LBS | BODY MASS INDEX: 34.65 KG/M2 | HEIGHT: 70 IN

## 2024-06-26 DIAGNOSIS — M48.061 SPINAL STENOSIS OF LUMBAR REGION, UNSPECIFIED WHETHER NEUROGENIC CLAUDICATION PRESENT: Primary | ICD-10-CM

## 2024-06-26 PROCEDURE — 1123F ACP DISCUSS/DSCN MKR DOCD: CPT | Performed by: PHYSICIAN ASSISTANT

## 2024-06-26 PROCEDURE — G8417 CALC BMI ABV UP PARAM F/U: HCPCS | Performed by: PHYSICIAN ASSISTANT

## 2024-06-26 PROCEDURE — 99214 OFFICE O/P EST MOD 30 MIN: CPT | Performed by: PHYSICIAN ASSISTANT

## 2024-06-26 PROCEDURE — 1036F TOBACCO NON-USER: CPT | Performed by: PHYSICIAN ASSISTANT

## 2024-06-26 PROCEDURE — G8427 DOCREV CUR MEDS BY ELIG CLIN: HCPCS | Performed by: PHYSICIAN ASSISTANT

## 2024-06-26 NOTE — PROGRESS NOTES
Insulin Glargine (TOUJEO SOLOSTAR SC), Inject 30 Units into the skin in the morning and at bedtime, Disp: , Rfl:     nortriptyline (PAMELOR) 50 MG capsule, Take 1 capsule by mouth nightly, Disp: , Rfl:     atorvastatin (LIPITOR) 80 MG tablet, 0.5 tablets, Disp: , Rfl:     amLODIPine (NORVASC) 5 MG tablet, TAKE 1 TABLET BY MOUTH EVERY DAY, Disp: 90 tablet, Rfl: 0    isosorbide mononitrate (IMDUR) 30 MG CR tablet, TAKE 1 TABLET BY MOUTH EVERY DAY, Disp: 30 tablet, Rfl: 5    Multiple Vitamins-Minerals (THERAPEUTIC MULTIVITAMIN-MINERALS) tablet, Take 1 tablet by mouth daily, Disp: , Rfl:     furosemide (LASIX) 40 MG tablet, Take 1 tablet by mouth 2 times daily, Disp: , Rfl:     SITagliptin (JANUVIA) 100 MG tablet, Take 1 tablet by mouth daily, Disp: , Rfl:     aspirin 81 MG tablet, Take 1 tablet by mouth daily, Disp: , Rfl:     Allergies:  Azithromycin    Social History:    reports that he quit smoking about 21 years ago. His smoking use included cigarettes. He started smoking about 76 years ago. He has a 220.0 pack-year smoking history. He quit smokeless tobacco use about 54 years ago.  His smokeless tobacco use included chew. He reports that he does not currently use alcohol. He reports that he does not use drugs.    Family History:   Family History   Problem Relation Age of Onset    Diabetes Mother     Unknown Father        REVIEW OF SYSTEMS: Full ROS noted & scanned   CONSTITUTIONAL: Denies unexplained weight loss, fevers, chills or fatigue  NEUROLOGICAL: Denies unsteady gait or progressive weakness  MUSCULOSKELETAL: Denies joint swelling or redness  PSYCHOLOGICAL: Denies anxiety, depression   SKIN: Denies skin changes, delayed healing, rash, itching   HEMATOLOGIC: Denies easy bleeding or bruising  ENDOCRINE: Denies excessive thirst, urination, heat/cold  RESPIRATORY: Denies current dyspnea, cough  GI: Denies nausea, vomiting, diarrhea   : Denies bowel or bladder issues      PHYSICAL EXAM:    Vitals: Height 1.778

## 2024-06-27 LAB
ALBUMIN: 3.6 GM/DL (ref 3.2–4.6)
ALP BLD-CCNC: 112 U/L (ref 40–129)
ALT SERPL-CCNC: 15 U/L
AST SERPL-CCNC: 19 U/L
BILIRUB SERPL-MCNC: 0.4 MG/DL (ref 0.2–1.4)
BILIRUBIN DIRECT: <0.2 MG/DL (ref 0–0.3)
TOTAL PROTEIN: 7.1 GM/DL (ref 6.4–8.3)

## 2024-06-28 ENCOUNTER — APPOINTMENT (OUTPATIENT)
Dept: PHYSICAL THERAPY | Age: 84
End: 2024-06-28
Payer: MEDICARE

## 2024-07-01 ENCOUNTER — TELEPHONE (OUTPATIENT)
Dept: PULMONOLOGY | Age: 84
End: 2024-07-01

## 2024-07-01 DIAGNOSIS — J84.9 ILD (INTERSTITIAL LUNG DISEASE) (HCC): Primary | ICD-10-CM

## 2024-07-01 NOTE — TELEPHONE ENCOUNTER
Please let Mukesh know is liver function is stable with being on the OFEV.  He will need repeat liver function test in 1 months time. There is a standing order. Please let him know.

## 2024-07-01 NOTE — TELEPHONE ENCOUNTER
Spoke with the patient's wife and confirmed results. She states that they have been having difficulty with them not having the order at [facility] when he goes there.  Advised to call prior to the appointment so that the orders can be faxed.

## 2024-07-02 ENCOUNTER — APPOINTMENT (OUTPATIENT)
Dept: PHYSICAL THERAPY | Age: 84
End: 2024-07-02
Payer: MEDICARE

## 2024-07-02 ENCOUNTER — HOSPITAL ENCOUNTER (OUTPATIENT)
Dept: MRI IMAGING | Age: 84
Discharge: HOME | End: 2024-07-02
Payer: MEDICARE

## 2024-07-02 DIAGNOSIS — M48.061 SPINAL STENOSIS OF LUMBAR REGION, UNSPECIFIED WHETHER NEUROGENIC CLAUDICATION PRESENT: ICD-10-CM

## 2024-07-02 PROCEDURE — 72148 MRI LUMBAR SPINE W/O DYE: CPT

## 2024-07-05 ENCOUNTER — APPOINTMENT (OUTPATIENT)
Dept: PHYSICAL THERAPY | Age: 84
End: 2024-07-05
Payer: MEDICARE

## 2024-07-09 ENCOUNTER — HOSPITAL ENCOUNTER (OUTPATIENT)
Dept: PHYSICAL THERAPY | Age: 84
Setting detail: THERAPIES SERIES
Discharge: HOME | End: 2024-07-09
Payer: MEDICARE

## 2024-07-09 PROCEDURE — 97110 THERAPEUTIC EXERCISES: CPT | Performed by: PHYSICAL THERAPIST

## 2024-07-09 NOTE — FLOWSHEET NOTE
following either an injury or surgery.   (13723) HOME EXERCISE PROGRAM - Reviewed/Progressed HEP activities related to strengthening, flexibility, endurance, ROM performed to prevent loss of range of motion, maintain or improve muscular strength or increase flexibility, following either an injury or surgery.  (21986) MANUAL THERAPY -  Manual therapy techniques, 1 or more regions, each 15 minutes (Mobilization/manipulation, manual lymphatic drainage, manual traction) for the purpose of modulating pain, promoting relaxation,  increasing ROM, reducing/eliminating soft tissue swelling/inflammation/restriction, improving soft tissue extensibility and allowing for proper ROM for normal function with self care, mobility, lifting and ambulation      GOALS     Patient stated goal: Ease pain.  Status: [] Progressing: [] Met: [x] Not Met: [] Adjusted    Therapist goals for Patient:   Short Term Goals: To be achieved in: 2 weeks  Independent in HEP and progression per patient tolerance, in order to progress toward full function and prevent re-injury.    Status: [] Progressing: [x] Met: [] Not Met: [] Adjusted  Patient will have a decrease in pain to 2/10 to help facilitate improvement in movement, function, and ADLs as indicated by functional deficits.   Status: [x] Progressing: [] Met: [] Not Met: [] Adjusted    Long Term Goals: To be achieved in: 10 weeks  Disability index score of 40% or less for the LEFS to assist with return top prior level of function.   Status: [x] Progressing: [] Met: [] Not Met: [] Adjusted  Improve ROM to WNL to allow for proper joint functioning as indicated by patients functional deficits.  Status: [x] Progressing: [] Met: [] Not Met: [] Adjusted  Pt to improve strength to show motor control/activation of proximal hip, TrA/abdominal, and posterior chain LE to facilitate functional mobility and ADLs.    Status: [x] Progressing: [] Met: [] Not Met: [] Adjusted  Patient will return to  walking around

## 2024-07-12 ENCOUNTER — APPOINTMENT (OUTPATIENT)
Dept: PHYSICAL THERAPY | Age: 84
End: 2024-07-12
Payer: MEDICARE

## 2024-07-16 ENCOUNTER — HOSPITAL ENCOUNTER (OUTPATIENT)
Dept: PHYSICAL THERAPY | Age: 84
Setting detail: THERAPIES SERIES
Discharge: HOME | End: 2024-07-16
Payer: MEDICARE

## 2024-07-16 PROCEDURE — 97110 THERAPEUTIC EXERCISES: CPT | Performed by: PHYSICAL THERAPIST

## 2024-07-16 NOTE — FLOWSHEET NOTE
[facility]- Outpatient Rehabilitation and Therapy  [address], [place] [address] office: [phone] fax: [phone]         Physical Therapy: TREATMENT/PROGRESS NOTE   Patient: William J Hohweiler (83 y.o. male)   Examination Date: 2024   :  1940 MRN: [id number]   Visit #: 19   Insurance Allowable Auth Needed   bmn []Yes    [x]No    Insurance: Payor: MEDICARE / Plan: MEDICARE PART A AND B / Product Type: *No Product type* /   Insurance ID: [id number] - (Medicare)  Secondary Insurance (if applicable):    Treatment Diagnosis:   Left hip pain M25.552   Medical Diagnosis:  Other specified postprocedural states [Z98.890] s/p Left hip pinning.     Referring Physician: Gorge Packer MD  PCP: Jonah Payne MD   Physical Therapy Re-Certification Plan of Care    Dear Gorge Packer MD  ,    We had the pleasure of treating the following patient for physical therapy services at [facility]. A summary of our findings can be found in the updated assessment below.  This includes our plan of care.  If you have any questions or concerns regarding these findings, please do not hesitate to contact me at the office phone number checked above.  Thank you for the referral.     Physician Signature:________________________________Date:__________________  By signing above (or electronic signature), therapist's plan is approved by physician      Functional Outcome: LEFS  28 = 65%  William J Hohweiler 1940 continues to present with functional deficits in strength symmetry and endurance of strength  limiting ability with walking on even ground and transitions between positions .  During therapy this date, patient required progression of exercises and program and manual interventions for exercise progression and modulating pain. Patient will continue to benefit from ongoing evaluation and advanced clinical decision from a Physical Therapist to

## 2024-07-18 ENCOUNTER — APPOINTMENT (OUTPATIENT)
Dept: PHYSICAL THERAPY | Age: 84
End: 2024-07-18
Payer: MEDICARE

## 2024-07-19 ENCOUNTER — APPOINTMENT (OUTPATIENT)
Dept: PHYSICAL THERAPY | Age: 84
End: 2024-07-19
Payer: MEDICARE

## 2024-07-19 ENCOUNTER — OFFICE VISIT (OUTPATIENT)
Dept: ORTHOPEDIC SURGERY | Age: 84
End: 2024-07-19

## 2024-07-19 VITALS — BODY MASS INDEX: 34.65 KG/M2 | HEIGHT: 70 IN | WEIGHT: 242 LBS

## 2024-07-19 DIAGNOSIS — M48.061 SPINAL STENOSIS OF LUMBAR REGION, UNSPECIFIED WHETHER NEUROGENIC CLAUDICATION PRESENT: Primary | ICD-10-CM

## 2024-07-19 DIAGNOSIS — M48.061 FORAMINAL STENOSIS OF LUMBAR REGION: ICD-10-CM

## 2024-07-19 NOTE — PROGRESS NOTES
motion is unremarkable. There is no gross instability. There are no rashes, ulcerations or lesions.  Strength and tone are normal.    Diagnostic Testing:    MRI: MRI of the lumbar spine that was obtained on 7/2/2024 was independently reviewed with the patient which shows anterolisthesis of L4 and L5 with retrolisthesis of L2 on L3 and L3 on L4.  There is a chronic compression fracture of L3.  There is mild to moderate central canal stenosis and moderate foraminal narrowing at L4-5.  There is mild to moderate left foraminal narrowing at L3-4.    X-rays: X-rays of the lumbar spine that were obtained on 6/13/2024 were independently reviewed with the patient which shows well-maintained lumbar lordosis with relatively well-maintained vertebral heights.  There is degeneration noted between L4-5 and L5-S1 with facet arthropathy from L4-S1.    Impression:   Lumbar DDD  Lumbar facet arthropathy  Probable spinal stenosis    1. Spinal stenosis of lumbar region, unspecified whether neurogenic claudication present    2. Foraminal stenosis of lumbar region        Plan:      We discussed the diagnosis and treatment options including observation, oral steroids, physical therapy, epidural injections and additional imaging. He wishes to proceed with referral to Dr. Davian Goncalves for his continue evaluation and care for potential spinal injection and or pain management.    Follow up -as needed    Old records were reviewed.    Total evaluation time to include patient education and coordination of care:31 minutes    Jostin Watters PA-C  Board certified by the [facility]  [facility]

## 2024-07-23 ENCOUNTER — HOSPITAL ENCOUNTER (OUTPATIENT)
Dept: PHYSICAL THERAPY | Age: 84
Setting detail: THERAPIES SERIES
Discharge: HOME | End: 2024-07-23
Payer: MEDICARE

## 2024-07-23 PROCEDURE — 97110 THERAPEUTIC EXERCISES: CPT | Performed by: PHYSICAL THERAPIST

## 2024-07-23 NOTE — FLOWSHEET NOTE
weekly - 1 sets - 10 reps  - Heel Toe Raises with Counter Support  - 2 x daily - 7 x weekly - 1 sets - 10 reps  - Mini Squat with Counter Support  - 2 x daily - 7 x weekly - 1 sets - 10 reps  ASSESSMENT     Today's Assessment:  Pt is having declining tolerance to exercises.   Pt to be put on hold until after injections.   Unable to progress program, but he is indep with current program.     Medical Necessity Documentation:  I certify that this patient meets the below criteria necessary for medical necessity for care and/or justification of therapy services:  The patient has a musculoskeletal condition(s) with a corresponding ICD-10 code that is of complexity and severity that require skilled therapeutic intervention. This has a direct and significant impact on the need for therapy and significantly impacts the rate of recovery.     Return to Play: NA    Prognosis for POC: [] Good [x] Fair  [] Poor      Patient requires continued skilled intervention: [x] Yes  [] No      CHARGE CAPTURE     PT CHARGE GRID   CPT Code (TIMED) minutes # CPT Code (UNTIMED) #     Therex (68624)  30 2  EVAL:LOW (65676 - Typically 20 minutes face-to-face)     Neuromusc. Re-ed (06364)    Re-Eval (86169)     Manual (13789)    Estim Unattended (10103)     Ther. Act (04201)    Mech. Traction (94345)     Gait (72514)    Dry Needle 1-2 muscle (20560)     Aquatic Therex (57205)    Dry Needle 3+ muscle (20561)     Iontophoresis (48478)    VASO (30218)     Ultrasound (03120)    Group Therapy (59123)     Estim Attended (63307)    Canalith Repositioning (76556)     Other:    Other:    Total Timed Code Tx Minutes 30 2       Total Treatment Minutes 30        Charge Justification:  (77464) THERAPEUTIC EXERCISE - Provided verbal/tactile cueing for activities related to strengthening, flexibility, endurance, ROM performed to prevent loss of range of motion, maintain or improve muscular strength or increase flexibility, following either an injury or surgery.

## 2024-07-26 ENCOUNTER — APPOINTMENT (OUTPATIENT)
Dept: PHYSICAL THERAPY | Age: 84
End: 2024-07-26
Payer: MEDICARE

## 2024-07-26 LAB
ALBUMIN: 3.5 G/DL (ref 3.5–5.7)
ALP BLD-CCNC: 91 IU/L (ref 35–135)
ALT SERPL-CCNC: 14 IU/L (ref 10–60)
AST SERPL-CCNC: 19 IU/L (ref 10–40)
BILIRUB SERPL-MCNC: 0.5 MG/DL (ref 0–1.2)
BILIRUBIN DIRECT: 0.2 MG/DL (ref 0–0.2)
TOTAL PROTEIN: 6.9 G/DL (ref 6–8)

## 2024-07-29 ENCOUNTER — TELEPHONE (OUTPATIENT)
Dept: PULMONOLOGY | Age: 84
End: 2024-07-29

## 2024-07-29 DIAGNOSIS — J84.10 PULMONARY FIBROSIS (HCC): Primary | Chronic | ICD-10-CM

## 2024-07-29 NOTE — TELEPHONE ENCOUNTER
Please let Mukesh know his liver function is stable.  Continue OFEV as prescribed.  Repeat liver labs in 1 month.  If  continue to be stable, can repeat annually.  Follow up with Dr. Jose in August as scheduled.

## 2024-07-30 ENCOUNTER — APPOINTMENT (OUTPATIENT)
Dept: PHYSICAL THERAPY | Age: 84
End: 2024-07-30
Payer: MEDICARE

## 2024-08-10 NOTE — PROGRESS NOTES
PULMONARY CLINIC NOTE      William J Hohweiler   : 1940  MRN: [id number]     Date of Service: 2024    PCP: Jonah Payne MD    Referring provider: No ref. provider found      Chief Complaint   Patient presents with    Sleep Apnea     3 month follow up- patient has been using his machine but unable to pull data remotely           ASSESSMENT & PLAN       84 y.o. pleasant  male patient with:    1. ILD (interstitial lung disease) ([facility])         Assessment:  IPF.  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  >200PY. Quit date: .  LDCT: Not indicated.  Metabolic syndrome: HTN, HLD, DM2, body habitus Body mass index is 34.95 kg/m².  AAA/PAD      Plan:              Will give the patient Trelegy inhaler 100 mcg to try between now and next visit.  If it is helping with his breathing will order.  Wash mouth after inhaler use.  Inhaler technique of use videos provided to watch.  Pulmonary function test before next visit  LFTs every 3 months instead of monthly  Will try to obtain the CPAP compliance report to review  Continue oxygen therapy 2 L/min at rest.  Can increase up to 5 with activity if that is helping with shortness of breath.  No interest in pulmonary rehab.  Tried previously.  CT chest without contrast in 2025  Follow-up in 2024      Health Maintenance/Preventive measures:        >>  Avoid smoking, vaping or secondhand exposure.  Avoid exposure to irritants, allergens as possible as well as contact with patients with infectious respiratory illness.        >>  Stay up-to-date with influenza, pneumonia, RSV, & COVID-19 vaccines as recommended by the Advisory Committee on Immunization Practices

## 2024-08-20 ENCOUNTER — OFFICE VISIT (OUTPATIENT)
Dept: PULMONOLOGY | Age: 84
End: 2024-08-20

## 2024-08-20 VITALS
WEIGHT: 243.6 LBS | RESPIRATION RATE: 20 BRPM | TEMPERATURE: 96.9 F | SYSTOLIC BLOOD PRESSURE: 154 MMHG | HEART RATE: 106 BPM | BODY MASS INDEX: 34.88 KG/M2 | DIASTOLIC BLOOD PRESSURE: 82 MMHG | OXYGEN SATURATION: 97 % | HEIGHT: 70 IN

## 2024-08-20 DIAGNOSIS — J84.9 ILD (INTERSTITIAL LUNG DISEASE) (HCC): Primary | ICD-10-CM

## 2024-08-20 RX ORDER — FLUTICASONE FUROATE, UMECLIDINIUM BROMIDE AND VILANTEROL TRIFENATATE 100; 62.5; 25 UG/1; UG/1; UG/1
1 POWDER RESPIRATORY (INHALATION) DAILY
Qty: 1 EACH | Refills: 0 | Status: SHIPPED | COMMUNITY
Start: 2024-08-20

## 2024-08-20 ASSESSMENT — SLEEP AND FATIGUE QUESTIONNAIRES
HOW LIKELY ARE YOU TO NOD OFF OR FALL ASLEEP WHILE LYING DOWN TO REST IN THE AFTERNOON WHEN CIRCUMSTANCES PERMIT: WOULD NEVER DOZE
HOW LIKELY ARE YOU TO NOD OFF OR FALL ASLEEP WHEN YOU ARE A PASSENGER IN A CAR FOR AN HOUR WITHOUT A BREAK: WOULD NEVER DOZE
HOW LIKELY ARE YOU TO NOD OFF OR FALL ASLEEP WHILE SITTING AND READING: WOULD NEVER DOZE
ESS TOTAL SCORE: 0
HOW LIKELY ARE YOU TO NOD OFF OR FALL ASLEEP WHILE WATCHING TV: WOULD NEVER DOZE
HOW LIKELY ARE YOU TO NOD OFF OR FALL ASLEEP WHILE SITTING INACTIVE IN A PUBLIC PLACE: WOULD NEVER DOZE
HOW LIKELY ARE YOU TO NOD OFF OR FALL ASLEEP WHILE SITTING AND TALKING TO SOMEONE: WOULD NEVER DOZE
HOW LIKELY ARE YOU TO NOD OFF OR FALL ASLEEP IN A CAR, WHILE STOPPED FOR A FEW MINUTES IN TRAFFIC: WOULD NEVER DOZE

## 2024-08-20 NOTE — PROGRESS NOTES
MA Communication:  The following orders are received by verbal communication from Hailee Collazo MD    Orders include:    PFT  Follow up October

## 2024-08-20 NOTE — PATIENT INSTRUCTIONS
Problem: At Risk for Falls  Goal: # Patient does not fall  Outcome: Outcome Not Met, Continue to Monitor  Goal: # Takes action to control fall-related risks  Outcome: Outcome Not Met, Continue to Monitor  Goal: # Verbalizes understanding of fall risk/precautions  Description: Document education using the patient education activity  Outcome: Outcome Not Met, Continue to Monitor     Problem: At Risk for Injury Due to Fall  Goal: # Patient does not fall  Outcome: Outcome Not Met, Continue to Monitor  Goal: # Takes action to control condition specific risks  Outcome: Outcome Not Met, Continue to Monitor  Goal: # Verbalizes understanding of fall-related injury personal risks  Description: Document education using the patient education activity  Outcome: Outcome Not Met, Continue to Monitor      Will give the patient Trelegy inhaler 100 mcg to try between now and next visit.  If it is helping with his breathing will order.  Wash mouth after inhaler use.  Inhaler technique of use videos provided to watch.  Pulmonary function test before next visit  LFTs every 3 months instead of monthly  Will try to obtain the CPAP compliance report to review  Continue oxygen therapy 2 L/min at rest.  Can increase up to 5 with activity if that is helping with shortness of breath.  CT chest without contrast in February 2025  Follow-up in October 2024      Health Maintenance/Preventive measures:        >>  Avoid smoking, vaping or secondhand exposure.  Avoid exposure to irritants, allergens as possible as well as contact with patients with infectious respiratory illness.        >>  Stay up-to-date with influenza & pneumonia vaccines, RSV, & COVID-19 vaccine as recommended by the Advisory Committee on Immunization Practices (ACIP)        >>  Healthy diet and activity as able.        >>  Acid reflux precautions: Head of bed elevation, avoiding tight clothes, avoiding big meals or snacking 3 hours before bedtime, targeting healthy weight.        >>  Practice sleep hygiene measures. Avoid driving or operating heavy machines if tired or sleepy.

## 2024-08-22 ENCOUNTER — TELEPHONE (OUTPATIENT)
Dept: PULMONOLOGY | Age: 84
End: 2024-08-22

## 2024-08-22 DIAGNOSIS — J84.9 ILD (INTERSTITIAL LUNG DISEASE) (HCC): Primary | ICD-10-CM

## 2024-08-22 NOTE — TELEPHONE ENCOUNTER
Patient's wife called stating that the patient has had diarrhea since Tuesday.  He as tried anti-diarrhea meds with no relief.  Wife thinks the diarrhea is related to the OFEV and would like a different medication for him to take.  Please advise.

## 2024-08-23 PROBLEM — M54.16 LUMBAR RADICULOPATHY: Status: ACTIVE | Noted: 2024-08-23

## 2024-08-23 PROBLEM — M48.062 LUMBAR STENOSIS WITH NEUROGENIC CLAUDICATION: Status: ACTIVE | Noted: 2024-08-23

## 2024-08-23 RX ORDER — PIRFENIDONE 267 MG/1
CAPSULE ORAL
Qty: 90 CAPSULE | Refills: 0 | Status: SHIPPED | OUTPATIENT
Start: 2024-08-23 | End: 2024-09-13

## 2024-08-23 NOTE — TELEPHONE ENCOUNTER
Spoke with the patient's wife and they would like to try the Esbriet again. Please send the prescription to their mail order pharmacy.

## 2024-08-23 NOTE — TELEPHONE ENCOUNTER
Spoke with the patient's wife and they will call back and schedule the appt. Will watch to confirm appt is made

## 2024-08-28 ENCOUNTER — TELEPHONE (OUTPATIENT)
Dept: PULMONOLOGY | Age: 84
End: 2024-08-28

## 2024-08-28 DIAGNOSIS — J84.9 ILD (INTERSTITIAL LUNG DISEASE) (HCC): ICD-10-CM

## 2024-08-28 RX ORDER — PIRFENIDONE 267 MG/1
801 CAPSULE ORAL
Qty: 270 CAPSULE | Refills: 5 | Status: SHIPPED | OUTPATIENT
Start: 2024-09-20

## 2024-08-28 RX ORDER — PIRFENIDONE 267 MG/1
CAPSULE ORAL
Qty: 126 CAPSULE | Refills: 0 | Status: SHIPPED | OUTPATIENT
Start: 2024-08-28 | End: 2024-10-27

## 2024-09-17 ENCOUNTER — HOSPITAL ENCOUNTER (OUTPATIENT)
Dept: PULMONOLOGY | Age: 84
Discharge: HOME | End: 2024-09-17
Payer: MEDICARE

## 2024-09-17 VITALS — OXYGEN SATURATION: 96 % | RESPIRATION RATE: 22 BRPM | HEART RATE: 106 BPM

## 2024-09-17 DIAGNOSIS — J84.9 ILD (INTERSTITIAL LUNG DISEASE) (HCC): ICD-10-CM

## 2024-09-17 LAB
DLCO %PRED: 57 %
DLCO PRED: NORMAL
DLCO/VA %PRED: NORMAL
DLCO/VA PRED: NORMAL
DLCO/VA: NORMAL
DLCO: NORMAL
EXPIRATORY TIME-POST: NORMAL
EXPIRATORY TIME: NORMAL
FEF 25-75 %CHNG: NORMAL
FEF 25-75 POST %PRED: NORMAL
FEF 25-75% %PRED-PRE: NORMAL
FEF 25-75% PRED: NORMAL
FEF 25-75-POST: NORMAL
FEF 25-75-PRE: NORMAL
FEV1 %PRED-POST: 59 %
FEV1 %PRED-PRE: 58 %
FEV1 PRED: NORMAL
FEV1-POST: NORMAL
FEV1-PRE: NORMAL
FEV1/FVC %PRED-POST: 109 %
FEV1/FVC %PRED-PRE: 108 %
FEV1/FVC PRED: NORMAL
FEV1/FVC-POST: NORMAL
FEV1/FVC-PRE: NORMAL
FVC %PRED-POST: 53 L
FVC %PRED-PRE: 53 %
FVC PRED: NORMAL
FVC-POST: NORMAL
FVC-PRE: NORMAL
GAW %PRED: NORMAL
GAW PRED: NORMAL
GAW: NORMAL
IC PRE %PRED: NORMAL
IC PRED: NORMAL
IC: NORMAL
MEP: NORMAL
MIP: NORMAL
MVV %PRED-PRE: NORMAL
MVV PRED: NORMAL
MVV-PRE: NORMAL
PEF %PRED-POST: NORMAL
PEF %PRED-PRE: NORMAL
PEF PRED: NORMAL
PEF%CHNG: NORMAL
PEF-POST: NORMAL
PEF-PRE: NORMAL
RAW %PRED: NORMAL
RAW PRED: NORMAL
RAW: NORMAL
RV PRE %PRED: NORMAL
RV PRED: NORMAL
RV: NORMAL
SVC %PRED: NORMAL
SVC PRED: NORMAL
SVC: NORMAL
TLC PRE %PRED: 72 %
TLC PRED: NORMAL
TLC: NORMAL
VA %PRED: NORMAL
VA PRED: NORMAL
VA: NORMAL
VTG %PRED: NORMAL
VTG PRED: NORMAL
VTG: NORMAL

## 2024-09-17 PROCEDURE — 94760 N-INVAS EAR/PLS OXIMETRY 1: CPT

## 2024-09-17 PROCEDURE — 94729 DIFFUSING CAPACITY: CPT

## 2024-09-17 PROCEDURE — 94060 EVALUATION OF WHEEZING: CPT

## 2024-09-17 PROCEDURE — 94726 PLETHYSMOGRAPHY LUNG VOLUMES: CPT

## 2024-09-17 PROCEDURE — 6370000000 HC RX 637 (ALT 250 FOR IP): Performed by: INTERNAL MEDICINE

## 2024-09-17 RX ORDER — ALBUTEROL SULFATE 90 UG/1
4 INHALANT RESPIRATORY (INHALATION) ONCE
Status: COMPLETED | OUTPATIENT
Start: 2024-09-17 | End: 2024-09-17

## 2024-09-17 RX ADMIN — Medication 4 PUFF: at 10:37

## 2024-09-17 ASSESSMENT — PULMONARY FUNCTION TESTS
FEV1/FVC_PERCENT_PREDICTED_PRE: 108
FEV1_PERCENT_PREDICTED_POST: 59
FVC_PERCENT_PREDICTED_PRE: 53
FEV1_PERCENT_PREDICTED_PRE: 58
FVC_PERCENT_PREDICTED_POST: 53
FEV1/FVC_PERCENT_PREDICTED_POST: 109

## 2024-09-18 PROCEDURE — 94729 DIFFUSING CAPACITY: CPT | Performed by: STUDENT IN AN ORGANIZED HEALTH CARE EDUCATION/TRAINING PROGRAM

## 2024-09-18 PROCEDURE — 94726 PLETHYSMOGRAPHY LUNG VOLUMES: CPT | Performed by: STUDENT IN AN ORGANIZED HEALTH CARE EDUCATION/TRAINING PROGRAM

## 2024-09-18 PROCEDURE — 94060 EVALUATION OF WHEEZING: CPT | Performed by: STUDENT IN AN ORGANIZED HEALTH CARE EDUCATION/TRAINING PROGRAM

## 2024-09-19 ENCOUNTER — HOSPITAL ENCOUNTER (OUTPATIENT)
Age: 84
Setting detail: OUTPATIENT SURGERY
Discharge: HOME | End: 2024-09-19
Attending: ANESTHESIOLOGY | Admitting: ANESTHESIOLOGY
Payer: MEDICARE

## 2024-09-19 VITALS
SYSTOLIC BLOOD PRESSURE: 151 MMHG | RESPIRATION RATE: 16 BRPM | OXYGEN SATURATION: 98 % | DIASTOLIC BLOOD PRESSURE: 72 MMHG | TEMPERATURE: 98.2 F | HEART RATE: 99 BPM

## 2024-09-19 LAB
GLUCOSE BLD-MCNC: 114 MG/DL (ref 70–99)
PERFORMED ON: ABNORMAL

## 2024-09-19 PROCEDURE — 6360000004 HC RX CONTRAST MEDICATION: Performed by: ANESTHESIOLOGY

## 2024-09-19 PROCEDURE — 3600000002 HC SURGERY LEVEL 2 BASE: Performed by: ANESTHESIOLOGY

## 2024-09-19 PROCEDURE — 64483 NJX AA&/STRD TFRM EPI L/S 1: CPT | Performed by: ANESTHESIOLOGY

## 2024-09-19 PROCEDURE — 2500000003 HC RX 250 WO HCPCS: Performed by: ANESTHESIOLOGY

## 2024-09-19 PROCEDURE — 6360000002 HC RX W HCPCS: Performed by: ANESTHESIOLOGY

## 2024-09-19 PROCEDURE — 7100000011 HC PHASE II RECOVERY - ADDTL 15 MIN: Performed by: ANESTHESIOLOGY

## 2024-09-19 PROCEDURE — 2580000003 HC RX 258: Performed by: ANESTHESIOLOGY

## 2024-09-19 PROCEDURE — 7100000010 HC PHASE II RECOVERY - FIRST 15 MIN: Performed by: ANESTHESIOLOGY

## 2024-09-19 RX ORDER — LIDOCAINE HYDROCHLORIDE 10 MG/ML
INJECTION, SOLUTION EPIDURAL; INFILTRATION; INTRACAUDAL; PERINEURAL PRN
Status: DISCONTINUED | OUTPATIENT
Start: 2024-09-19 | End: 2024-09-19 | Stop reason: ALTCHOICE

## 2024-09-19 ASSESSMENT — PAIN - FUNCTIONAL ASSESSMENT
PAIN_FUNCTIONAL_ASSESSMENT: PREVENTS OR INTERFERES SOME ACTIVE ACTIVITIES AND ADLS
PAIN_FUNCTIONAL_ASSESSMENT: 0-10

## 2024-09-19 ASSESSMENT — PAIN DESCRIPTION - DESCRIPTORS: DESCRIPTORS: DISCOMFORT

## 2024-09-25 ENCOUNTER — TELEPHONE (OUTPATIENT)
Dept: ORTHOPEDIC SURGERY | Age: 84
End: 2024-09-25

## 2024-09-26 NOTE — PROGRESS NOTES
PULMONARY CLINIC NOTE      William J Hohweiler   : 1940  MRN: [id number]     Date of Service: 10/10/2024    PCP: Jonah Payne MD    Referring provider: No ref. provider found      Chief Complaint   Patient presents with    Follow-up    Results    Sleep Apnea     PFT           ASSESSMENT & PLAN       84 y.o. pleasant  male patient with:    1. ILD (interstitial lung disease) ([facility])           Assessment:  IPF.  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  >200PY. Quit date: .  LDCT: Not indicated.  Metabolic syndrome: HTN, HLD, DM2, body habitus   AAA/PAD      Plan:              Will give the patient Trelegy inhaler 100 mcg to try between now and next visit.  If it is helping with his breathing will order.  Wash mouth after inhaler use.  Inhaler technique of use videos provided to watch.  Pulmonary function test before next visit  LFTs every 3 months instead of monthly  Will try to obtain the CPAP compliance report to review  Continue oxygen therapy 2 L/min at rest.  Can increase up to 5 with activity if that is helping with shortness of breath.  No interest in pulmonary rehab.  Tried previously.  CT chest without contrast in 2025  Follow-up in 2024      Health Maintenance/Preventive measures:        >>  Avoid smoking, vaping or secondhand exposure.  Avoid exposure to irritants, allergens as possible as well as contact with patients with infectious respiratory illness.        >>  Stay up-to-date with influenza, pneumonia, RSV, & COVID-19 vaccines as recommended by the Advisory Committee on Immunization Practices (ACIP)        >>  Healthy

## 2024-10-01 ENCOUNTER — OFFICE VISIT (OUTPATIENT)
Dept: ORTHOPEDIC SURGERY | Age: 84
End: 2024-10-01
Payer: MEDICARE

## 2024-10-01 VITALS — HEIGHT: 70 IN | BODY MASS INDEX: 34.79 KG/M2 | WEIGHT: 243 LBS

## 2024-10-01 DIAGNOSIS — T84.84XA PAINFUL ORTHOPAEDIC HARDWARE (HCC): ICD-10-CM

## 2024-10-01 DIAGNOSIS — M70.62 TROCHANTERIC BURSITIS OF LEFT HIP: Primary | ICD-10-CM

## 2024-10-01 DIAGNOSIS — M87.052 AVASCULAR NECROSIS OF BONE OF LEFT HIP: ICD-10-CM

## 2024-10-01 PROCEDURE — 99213 OFFICE O/P EST LOW 20 MIN: CPT | Performed by: ORTHOPAEDIC SURGERY

## 2024-10-01 PROCEDURE — 1123F ACP DISCUSS/DSCN MKR DOCD: CPT | Performed by: ORTHOPAEDIC SURGERY

## 2024-10-01 PROCEDURE — G8484 FLU IMMUNIZE NO ADMIN: HCPCS | Performed by: ORTHOPAEDIC SURGERY

## 2024-10-01 PROCEDURE — G8417 CALC BMI ABV UP PARAM F/U: HCPCS | Performed by: ORTHOPAEDIC SURGERY

## 2024-10-01 PROCEDURE — G8427 DOCREV CUR MEDS BY ELIG CLIN: HCPCS | Performed by: ORTHOPAEDIC SURGERY

## 2024-10-01 PROCEDURE — 1036F TOBACCO NON-USER: CPT | Performed by: ORTHOPAEDIC SURGERY

## 2024-10-01 NOTE — PROGRESS NOTES
Compartments are soft and compressible.  There is no calf tenderness and a negative Homans' sign.    RADIOGRAPHIC EXAM:  X-rays obtained 6/13/2024 were reviewed including an AP pelvis, AP and frog lateral views of the hip.  This demonstrates mature healing involving nondisplaced femoral neck fracture.  There is mild to moderate osteoarthritic changes.  There is spurring about the femoral head neck junction as well as inferiorly.  There is avascular necrosis involving the inferomedial femoral head.  There is prominence of percutaneously placed fixation screws approximately 1 cm at the vastus ridge.    Viewing of CT scan imaging from 7/8/2023 demonstrates focus of avascular necrosis changes within the superior weightbearing femoral head.  There is transfixion of femoral neck fracture with cannulated screws.  There is mature healing.    ASSESSMENT AND PLAN:  Painful orthopedic hardware  Left hip osteoarthritis  Left hip avascular necrosis    I reviewed the above diagnoses with the patient.  I reassured him that his femoral neck fracture is maturely healed.  I believe his symptoms are multifactorial.  He does have tenderness overlying the greater trochanter and a resultant bursitis from prominent fixation screws/iliotibial band irritation.  Additionally, he does have a focus of avascular necrosis on most recent CT scan which may be contributing to his pain.  Finally, he does have a lumbar issue which may also be contributing.  I reviewed the options which include continued nonsurgical management, relative rest, temperature modalities versus surgical intervention.  I described 2 separate surgical intervention options.  The first of which was simple removal of cannulated screws.  This is a more minor procedure with a limited risk profile.  I did describe to him that there would be screw tracts which could fracture and would recommend limitation of weightbearing postoperatively.  He does not believe he would be able to

## 2024-10-10 ENCOUNTER — OFFICE VISIT (OUTPATIENT)
Dept: PULMONOLOGY | Age: 84
End: 2024-10-10
Payer: MEDICARE

## 2024-10-10 VITALS
DIASTOLIC BLOOD PRESSURE: 78 MMHG | BODY MASS INDEX: 34.5 KG/M2 | OXYGEN SATURATION: 99 % | RESPIRATION RATE: 16 BRPM | HEIGHT: 70 IN | WEIGHT: 241 LBS | HEART RATE: 111 BPM | TEMPERATURE: 97.6 F | SYSTOLIC BLOOD PRESSURE: 117 MMHG

## 2024-10-10 DIAGNOSIS — J84.9 ILD (INTERSTITIAL LUNG DISEASE) (HCC): Primary | ICD-10-CM

## 2024-10-10 PROCEDURE — 1123F ACP DISCUSS/DSCN MKR DOCD: CPT | Performed by: INTERNAL MEDICINE

## 2024-10-10 PROCEDURE — G8417 CALC BMI ABV UP PARAM F/U: HCPCS | Performed by: INTERNAL MEDICINE

## 2024-10-10 PROCEDURE — G2211 COMPLEX E/M VISIT ADD ON: HCPCS | Performed by: INTERNAL MEDICINE

## 2024-10-10 PROCEDURE — 99214 OFFICE O/P EST MOD 30 MIN: CPT | Performed by: INTERNAL MEDICINE

## 2024-10-10 PROCEDURE — G8427 DOCREV CUR MEDS BY ELIG CLIN: HCPCS | Performed by: INTERNAL MEDICINE

## 2024-10-10 PROCEDURE — 1036F TOBACCO NON-USER: CPT | Performed by: INTERNAL MEDICINE

## 2024-10-10 PROCEDURE — 3078F DIAST BP <80 MM HG: CPT | Performed by: INTERNAL MEDICINE

## 2024-10-10 PROCEDURE — 3074F SYST BP LT 130 MM HG: CPT | Performed by: INTERNAL MEDICINE

## 2024-10-10 PROCEDURE — G8484 FLU IMMUNIZE NO ADMIN: HCPCS | Performed by: INTERNAL MEDICINE

## 2024-10-10 RX ORDER — ALBUTEROL SULFATE 90 UG/1
2 INHALANT RESPIRATORY (INHALATION) 4 TIMES DAILY PRN
Qty: 8.5 G | Refills: 0 | Status: SHIPPED | OUTPATIENT
Start: 2024-10-10 | End: 2024-12-09

## 2024-10-10 NOTE — PATIENT INSTRUCTIONS
Continue Esbriet for IPF  Continue LFTs every 3 months  Albuterol will be given for air trapping.  Recommended to use before anticipated exertion  Continue oxygen 2 L/min with activity.  Okay to increase flow temporarily during activity to help with shortness of breath  Continue CPAP therapy with 2 L of oxygen bled in  CT chest without contrast for follow-up on IPF in February 2025  Follow-up 1 week after the CT scan    Health Maintenance/Preventive measures:        >>  Avoid smoking, vaping or secondhand exposure.  Avoid exposure to irritants, allergens as possible as well as contact with patients with infectious respiratory illness.        >>  Stay up-to-date with influenza & pneumonia vaccines, RSV, & COVID-19 vaccine as recommended by the Advisory Committee on Immunization Practices (ACIP)        >>  Healthy diet and activity as able.        >>  Acid reflux precautions: Head of bed elevation, avoiding tight clothes, avoiding big meals or snacking 3 hours before bedtime, targeting healthy weight.        >>  Practice sleep hygiene measures. Avoid driving or operating heavy machines if tired or sleepy.    yes...

## 2024-10-10 NOTE — PROGRESS NOTES
MA Communication:  The following orders are received by verbal communication from Hailee Collazo MD    Orders include:    Will schedule CT at [facility]  Follow scheduled for 02/26/2025

## 2024-11-07 ENCOUNTER — HOSPITAL ENCOUNTER (OUTPATIENT)
Age: 84
Setting detail: OUTPATIENT SURGERY
Discharge: HOME | End: 2024-11-07
Attending: ANESTHESIOLOGY | Admitting: ANESTHESIOLOGY
Payer: MEDICARE

## 2024-11-07 VITALS
TEMPERATURE: 98 F | SYSTOLIC BLOOD PRESSURE: 139 MMHG | WEIGHT: 240 LBS | BODY MASS INDEX: 34.36 KG/M2 | RESPIRATION RATE: 18 BRPM | OXYGEN SATURATION: 99 % | HEIGHT: 70 IN | DIASTOLIC BLOOD PRESSURE: 86 MMHG | HEART RATE: 96 BPM

## 2024-11-07 LAB
GLUCOSE BLD-MCNC: 159 MG/DL (ref 70–99)
PERFORMED ON: ABNORMAL

## 2024-11-07 PROCEDURE — 3600000002 HC SURGERY LEVEL 2 BASE: Performed by: ANESTHESIOLOGY

## 2024-11-07 PROCEDURE — 2500000003 HC RX 250 WO HCPCS: Performed by: ANESTHESIOLOGY

## 2024-11-07 PROCEDURE — 2580000003 HC RX 258: Performed by: ANESTHESIOLOGY

## 2024-11-07 PROCEDURE — 6360000002 HC RX W HCPCS: Performed by: ANESTHESIOLOGY

## 2024-11-07 PROCEDURE — 7100000010 HC PHASE II RECOVERY - FIRST 15 MIN: Performed by: ANESTHESIOLOGY

## 2024-11-07 PROCEDURE — 6360000004 HC RX CONTRAST MEDICATION: Performed by: ANESTHESIOLOGY

## 2024-11-07 RX ORDER — LIDOCAINE HYDROCHLORIDE 10 MG/ML
INJECTION, SOLUTION EPIDURAL; INFILTRATION; INTRACAUDAL; PERINEURAL PRN
Status: DISCONTINUED | OUTPATIENT
Start: 2024-11-07 | End: 2024-11-07 | Stop reason: ALTCHOICE

## 2024-11-07 ASSESSMENT — PAIN - FUNCTIONAL ASSESSMENT
PAIN_FUNCTIONAL_ASSESSMENT: 0-10
PAIN_FUNCTIONAL_ASSESSMENT: PREVENTS OR INTERFERES WITH MANY ACTIVE NOT PASSIVE ACTIVITIES

## 2024-11-07 NOTE — H&P
Nursing History and Physical reviewed and agreed upon.      Additional findings:    Allergies and medications have been reviewed.    HENT: Airway patent and reviewed  Cardiovascular: Normal rate, regular rhythm, normal heart sounds.   Pulmonary/Chest: No wheezes. No rhonchi. No rales.   Abdominal: Soft. Bowel sounds are normal. No distension.    Mallampati: 2    ASA CLASS:         []   I. Normal, healthy adult           [x]   II.  Mild systemic disease            []   III.  Severe systemic disease      Sedation plan:   [x]  Local/MINIMAL     []  Minimal    MALLAMPATI:           []   I.   Complete visualization of the soft palate           [x]   II.  Complete visualization of the uvula            []   III.  Visualization of only the base of the uvula           []   IV. Soft palate is not visibl    Patient's condition acceptable for planned procedure/sedation.   Post Procedure Plan   Return to same level of care   ______________________     The risks and benefits as well as alternatives to the procedure have been discussed with the patient and or family.  The patient and or next of kin understands and agrees to proceed.    Davian Goncalves MD

## 2024-11-07 NOTE — PROGRESS NOTES
Pt came into [facility] at rest and difficulty talking. O2 was 91%. Pt assisted to stretcher from the lobby via WC. -110, O2 sat 97% on 2L. Pt began feeling better the longer he rested in stretcher. Pt states he woke feeling unsteady and disoriented. Dr arriaga aware. This RN spoke with wife and states she was aware, agreed to take to PCP or ER if complaints continue.

## 2024-11-07 NOTE — DISCHARGE INSTRUCTIONS
[facility]                    [phone]                        Post Pain Management Injection                                          1)  PATIENT INSTRUCTIONS: - Resume Normal Diet         - Other                         2)  ACTIVITY: * No driving or operating machinery for 8 hours post procedure without sedation      and 24 hours with sedation.  If you are seen driving during this time the        proper authorities will be notified.        * Do not stay alone for 4-6 hours after the procedure       * Do not sign legal documents, make any major decisions, or be involved in       work decisions for the remainder of the day.       - May shower or bathe          - Resume normal activity when full movement/sensation has       returned in extremities                   3)  SITE CARE: - Observe puncture site for signs of infection (redness, warmth       swelling, drainage with a foul odor, fever or increased tenderness)                4)  EXPECTED SIDE EFFECTS: * Numbness/tingling/weakness in extremities, if this lasts more       than 6 hours notify Dr. Goncalves        - Muscle stiffness, soreness at puncture site (soreness may       last 2-4 days)                    5)  DIABETIC PATIENTS ONLY    - Increased glucose levels in all diabetic patients who have received       a steroid injection.               - Monitor blood sugars frequently for the first 5 days following procedure.          Adjust medication accordingly.                  6)  TO REACH DR. GONCALVES: - Call [phone]                    7)  ADDITIONAL INSTRUCTIONS: - Follow-up as scheduled or call for appointment if not already done.        - Patients taking blood thinners may resume as prescribed. Coumadin may resume this evening, all other blood thinners can be restarted tomorrow.

## 2024-11-07 NOTE — PROCEDURES
nerve injury, incomplete pain control, worsening of pain, and headache.  All questions were answered and treatment options discussed.  Patient agreed to continue with planned procedure.    Time-out  A time-out was completed prior to starting the procedure.  Staff in the procedure suite as well as myself reviewed and confirmed the patient name, type or procedure and procedure location, and the presurgical questionnaire including blood thinners, allergies, and infections risks.  Time-out was completed properly.      Procedure  The patient was placed prone on the operating room table.  They were prepped and draped in the usual sterile fashion.  Strict aseptic technique was used throughout the procedure and the procedure was performed in the usual manner.    Using sterile fluoroscopic images, detailed  images were utilized to visualize the lumbar spine.  I identified the appropriate level and squared the endplates along with an oblique view for needle placement. Then via a 25 gauge needle, 2% lidocaine was infiltrated intradermally and deeper at the L4-5 interspace as identified by fluoroscopy.  A 22 gauge 3.5 inch spinal needle was inserted through the skin and advanced slowly under fluoroscopic guidance until the tip of the needle contacted the superior articulating process of L5. The needle was \"walked\" laterally and then advanced in the AP view to the 6 o'clock position of the pedicle in Kambin's triangle. A lateral image was viewed to confirm placement.  After negative aspiration, 1 cc of omnipaque 240 was injected demonstrating an epidurogram outlining the spinal nerve without intrathecal or intravascular uptake.  After negative aspiration, a 2ml solution containing 1ml of 6mg/ml Betamethasone and Lidocaine 1% and was injected slowly and incrementally.  The needle was withdrawn after flushing.  Next, the same above steps were taken for the transforaminal injection at the L5-S1 interspace. Outline of the

## 2024-12-16 ENCOUNTER — TELEPHONE (OUTPATIENT)
Dept: ADMINISTRATIVE | Age: 84
End: 2024-12-16

## 2024-12-16 NOTE — TELEPHONE ENCOUNTER
Submitted PA for Pirfenidone 267MG capsules   [facility]: [id number])  STATUS: PENDING.    Follow up done daily; if no decision with in three days we will refax.  If another three days goes by with no decision will call the insurance for status.

## 2025-02-14 ENCOUNTER — HOSPITAL ENCOUNTER (OUTPATIENT)
Dept: CT IMAGING | Age: 85
Discharge: HOME | End: 2025-02-14
Attending: INTERNAL MEDICINE
Payer: MEDICARE

## 2025-02-14 DIAGNOSIS — J84.9 ILD (INTERSTITIAL LUNG DISEASE) (HCC): ICD-10-CM

## 2025-02-14 PROCEDURE — 71250 CT THORAX DX C-: CPT

## 2025-02-24 NOTE — PROGRESS NOTES
PULMONARY CLINIC NOTE      William J Hohweiler   : 1940  MRN: [id number]     Date of Service: 2025    PCP: Jonah Payne MD    Referring provider: No ref. provider found      Chief Complaint   Patient presents with    Results     CT    Sleep Apnea           ASSESSMENT & PLAN       84 y.o. pleasant  male patient with:    Assessment:  IPF.  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  >200PY. Quit date: .  LDCT: Not indicated.  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  Needed right carotid artery endarterectomy at [facility]    Plan:              Reviewed the CAT scan findings showing progression of the ILD since  especially on the right side which is expected.  I explained that many people might not survive 5 years with a previous CAT scan findings but the patient survived more than 10 years and maybe the antifibrotic medications are still working.  Will refill pirfenidone  Liver function test today and every 3 months  Patient will let us know if Trelegy is helping or not to decide on the refills  Pulmonary function test before next visit  Continue CPAP therapy with the current settings  Continue oxygen 2 L/min during the day and bled into the CPAP  No interest in pulmonary rehab.  Tried previously.  Follow-up in 6 months      Health Maintenance/Preventive measures:        >>  Avoid smoking, vaping or secondhand exposure.  Avoid exposure to irritants, allergens as possible as well as contact with patients with infectious respiratory illness.        >>

## 2025-02-26 ENCOUNTER — OFFICE VISIT (OUTPATIENT)
Dept: PULMONOLOGY | Age: 85
End: 2025-02-26
Payer: MEDICARE

## 2025-02-26 VITALS
RESPIRATION RATE: 16 BRPM | DIASTOLIC BLOOD PRESSURE: 75 MMHG | HEART RATE: 107 BPM | TEMPERATURE: 96.9 F | WEIGHT: 236.6 LBS | BODY MASS INDEX: 33.87 KG/M2 | OXYGEN SATURATION: 90 % | HEIGHT: 70 IN | SYSTOLIC BLOOD PRESSURE: 151 MMHG

## 2025-02-26 DIAGNOSIS — J84.9 ILD (INTERSTITIAL LUNG DISEASE) (HCC): ICD-10-CM

## 2025-02-26 LAB
ALBUMIN: 3.8 G/DL (ref 3.5–5.7)
ALP BLD-CCNC: 91 IU/L (ref 35–135)
ALT SERPL-CCNC: 13 IU/L (ref 10–60)
AST SERPL-CCNC: 22 IU/L (ref 10–40)
BILIRUB SERPL-MCNC: 0.4 MG/DL (ref 0–1.2)
BILIRUBIN DIRECT: 0.1 MG/DL (ref 0–0.2)
TOTAL PROTEIN: 7.3 G/DL (ref 6–8)

## 2025-02-26 PROCEDURE — 1159F MED LIST DOCD IN RCRD: CPT | Performed by: INTERNAL MEDICINE

## 2025-02-26 PROCEDURE — 3078F DIAST BP <80 MM HG: CPT | Performed by: INTERNAL MEDICINE

## 2025-02-26 PROCEDURE — G2211 COMPLEX E/M VISIT ADD ON: HCPCS | Performed by: INTERNAL MEDICINE

## 2025-02-26 PROCEDURE — 99215 OFFICE O/P EST HI 40 MIN: CPT | Performed by: INTERNAL MEDICINE

## 2025-02-26 PROCEDURE — 1036F TOBACCO NON-USER: CPT | Performed by: INTERNAL MEDICINE

## 2025-02-26 PROCEDURE — 3077F SYST BP >= 140 MM HG: CPT | Performed by: INTERNAL MEDICINE

## 2025-02-26 PROCEDURE — G8417 CALC BMI ABV UP PARAM F/U: HCPCS | Performed by: INTERNAL MEDICINE

## 2025-02-26 PROCEDURE — G8427 DOCREV CUR MEDS BY ELIG CLIN: HCPCS | Performed by: INTERNAL MEDICINE

## 2025-02-26 PROCEDURE — 1123F ACP DISCUSS/DSCN MKR DOCD: CPT | Performed by: INTERNAL MEDICINE

## 2025-02-26 RX ORDER — PIRFENIDONE 267 MG/1
801 CAPSULE ORAL
Qty: 270 CAPSULE | Refills: 5 | Status: SHIPPED | OUTPATIENT
Start: 2025-02-26

## 2025-02-26 NOTE — PROGRESS NOTES
MA Communication:  The following orders are received by verbal communication from Hailee Collazo MD    Orders include:    PFT  Follow up 6 months  Blood work today

## 2025-02-26 NOTE — PATIENT INSTRUCTIONS
Will refill pirfenidone  Liver function test today and every 3 months  Patient will let us know if Trelegy is helping or not to decide on the refills  Pulmonary function test before next visit  Continue CPAP therapy with the current settings  Continue oxygen 2 L/min during the day and bled into the CPAP  No interest in pulmonary rehab.  Tried previously.  Follow-up in 6 months      Health Maintenance/Preventive measures:        >>  Avoid smoking, vaping or secondhand exposure.  Avoid exposure to irritants, allergens as possible as well as contact with patients with infectious respiratory illness.        >>  Stay up-to-date with influenza & pneumonia vaccines, RSV, & COVID-19 vaccine as recommended by the Advisory Committee on Immunization Practices (ACIP)        >>  Healthy diet and activity as able.        >>  Acid reflux precautions: Head of bed elevation, avoiding tight clothes, avoiding big meals or snacking 3 hours before bedtime, targeting healthy weight.        >>  Practice sleep hygiene measures. Avoid driving or operating heavy machines if tired or sleepy.

## 2025-03-18 DIAGNOSIS — J84.9 ILD (INTERSTITIAL LUNG DISEASE) (HCC): ICD-10-CM

## 2025-03-18 RX ORDER — PIRFENIDONE 267 MG/1
801 CAPSULE ORAL
Qty: 270 CAPSULE | Refills: 5 | Status: SHIPPED | OUTPATIENT
Start: 2025-03-18

## 2025-03-18 NOTE — TELEPHONE ENCOUNTER
Pt spouse called stating that script for Pirfenidone needs to be sent to [facility] pharmacy.  New script pending.

## 2025-08-05 ENCOUNTER — APPOINTMENT (OUTPATIENT)
Dept: GENERAL RADIOLOGY | Age: 85
End: 2025-08-05
Payer: MEDICARE

## 2025-08-05 ENCOUNTER — HOSPITAL ENCOUNTER (EMERGENCY)
Age: 85
Discharge: HOME | End: 2025-08-06
Payer: MEDICARE

## 2025-08-05 DIAGNOSIS — R06.02 SHORTNESS OF BREATH: Primary | ICD-10-CM

## 2025-08-05 DIAGNOSIS — R73.9 HYPERGLYCEMIA: ICD-10-CM

## 2025-08-05 LAB
ALBUMIN SERPL-MCNC: 3.5 G/DL (ref 3.4–5)
ALBUMIN/GLOB SERPL: 1.1 {RATIO} (ref 1.1–2.2)
ALP SERPL-CCNC: 152 U/L (ref 40–129)
ALT SERPL-CCNC: 11 U/L (ref 10–40)
ANION GAP SERPL CALCULATED.3IONS-SCNC: 9 MMOL/L (ref 3–16)
AST SERPL-CCNC: 21 U/L (ref 15–37)
BASOPHILS # BLD: 0 K/UL (ref 0–0.2)
BASOPHILS NFR BLD: 0.4 %
BILIRUB SERPL-MCNC: 0.4 MG/DL (ref 0–1)
BUN SERPL-MCNC: 26 MG/DL (ref 7–20)
CALCIUM SERPL-MCNC: 9.5 MG/DL (ref 8.3–10.6)
CHLORIDE SERPL-SCNC: 95 MMOL/L (ref 99–110)
CO2 SERPL-SCNC: 31 MMOL/L (ref 21–32)
CREAT SERPL-MCNC: 1.2 MG/DL (ref 0.8–1.3)
DEPRECATED RDW RBC AUTO: 13.1 % (ref 12.4–15.4)
EOSINOPHIL # BLD: 0.5 K/UL (ref 0–0.6)
EOSINOPHIL NFR BLD: 5.5 %
GFR SERPLBLD CREATININE-BSD FMLA CKD-EPI: 60 ML/MIN/{1.73_M2}
GLUCOSE BLD-MCNC: 365 MG/DL (ref 70–99)
GLUCOSE SERPL-MCNC: 439 MG/DL (ref 70–99)
HCT VFR BLD AUTO: 36.8 % (ref 40.5–52.5)
HGB BLD-MCNC: 12.7 G/DL (ref 13.5–17.5)
LYMPHOCYTES # BLD: 2.9 K/UL (ref 1–5.1)
LYMPHOCYTES NFR BLD: 35.3 %
MCH RBC QN AUTO: 31.5 PG (ref 26–34)
MCHC RBC AUTO-ENTMCNC: 34.5 G/DL (ref 31–36)
MCV RBC AUTO: 91.3 FL (ref 80–100)
MONOCYTES # BLD: 0.4 K/UL (ref 0–1.3)
MONOCYTES NFR BLD: 4.8 %
NEUTROPHILS # BLD: 4.5 K/UL (ref 1.7–7.7)
NEUTROPHILS NFR BLD: 54 %
NT-PROBNP SERPL-MCNC: 749 PG/ML (ref 0–449)
PERFORMED ON: ABNORMAL
PLATELET # BLD AUTO: 231 K/UL (ref 135–450)
PMV BLD AUTO: 6.9 FL (ref 5–10.5)
POTASSIUM SERPL-SCNC: 4.3 MMOL/L (ref 3.5–5.1)
PROT SERPL-MCNC: 6.8 G/DL (ref 6.4–8.2)
RBC # BLD AUTO: 4.03 M/UL (ref 4.2–5.9)
SODIUM SERPL-SCNC: 135 MMOL/L (ref 136–145)
TROPONIN, HIGH SENSITIVITY: 21 NG/L (ref 0–22)
WBC # BLD AUTO: 8.3 K/UL (ref 4–11)

## 2025-08-05 PROCEDURE — 71045 X-RAY EXAM CHEST 1 VIEW: CPT

## 2025-08-05 PROCEDURE — 84484 ASSAY OF TROPONIN QUANT: CPT

## 2025-08-05 PROCEDURE — 85025 COMPLETE CBC W/AUTO DIFF WBC: CPT

## 2025-08-05 PROCEDURE — 36415 COLL VENOUS BLD VENIPUNCTURE: CPT

## 2025-08-05 PROCEDURE — 2580000003 HC RX 258: Performed by: PHYSICIAN ASSISTANT

## 2025-08-05 PROCEDURE — 99285 EMERGENCY DEPT VISIT HI MDM: CPT

## 2025-08-05 PROCEDURE — 93005 ELECTROCARDIOGRAM TRACING: CPT | Performed by: PHYSICIAN ASSISTANT

## 2025-08-05 PROCEDURE — 6370000000 HC RX 637 (ALT 250 FOR IP): Performed by: PHYSICIAN ASSISTANT

## 2025-08-05 PROCEDURE — 83880 ASSAY OF NATRIURETIC PEPTIDE: CPT

## 2025-08-05 PROCEDURE — 80053 COMPREHEN METABOLIC PANEL: CPT

## 2025-08-05 RX ORDER — 0.9 % SODIUM CHLORIDE 0.9 %
500 INTRAVENOUS SOLUTION INTRAVENOUS ONCE
Status: COMPLETED | OUTPATIENT
Start: 2025-08-05 | End: 2025-08-06

## 2025-08-05 RX ADMIN — SODIUM CHLORIDE 500 ML: 0.9 INJECTION, SOLUTION INTRAVENOUS at 22:37

## 2025-08-05 RX ADMIN — INSULIN HUMAN 10 UNITS: 100 INJECTION, SOLUTION PARENTERAL at 22:37

## 2025-08-05 ASSESSMENT — PAIN - FUNCTIONAL ASSESSMENT: PAIN_FUNCTIONAL_ASSESSMENT: NONE - DENIES PAIN

## 2025-08-06 VITALS
RESPIRATION RATE: 24 BRPM | DIASTOLIC BLOOD PRESSURE: 91 MMHG | HEIGHT: 70 IN | HEART RATE: 94 BPM | WEIGHT: 240 LBS | SYSTOLIC BLOOD PRESSURE: 150 MMHG | TEMPERATURE: 98.2 F | BODY MASS INDEX: 34.36 KG/M2 | OXYGEN SATURATION: 100 %

## 2025-08-06 LAB
EKG ATRIAL RATE: 104 BPM
EKG DIAGNOSIS: NORMAL
EKG P AXIS: 19 DEGREES
EKG P-R INTERVAL: 188 MS
EKG Q-T INTERVAL: 346 MS
EKG QRS DURATION: 80 MS
EKG QTC CALCULATION (BAZETT): 454 MS
EKG R AXIS: 14 DEGREES
EKG T AXIS: 30 DEGREES
EKG VENTRICULAR RATE: 104 BPM

## 2025-08-06 PROCEDURE — 93010 ELECTROCARDIOGRAM REPORT: CPT | Performed by: INTERNAL MEDICINE

## 2025-08-26 ENCOUNTER — OFFICE VISIT (OUTPATIENT)
Dept: PULMONOLOGY | Age: 85
End: 2025-08-26
Payer: MEDICARE

## 2025-08-26 ENCOUNTER — HOSPITAL ENCOUNTER (OUTPATIENT)
Dept: PULMONOLOGY | Age: 85
Discharge: HOME | End: 2025-08-26
Payer: MEDICARE

## 2025-08-26 VITALS
HEART RATE: 109 BPM | WEIGHT: 225 LBS | DIASTOLIC BLOOD PRESSURE: 52 MMHG | TEMPERATURE: 97 F | RESPIRATION RATE: 16 BRPM | SYSTOLIC BLOOD PRESSURE: 92 MMHG | OXYGEN SATURATION: 92 % | HEIGHT: 60 IN | BODY MASS INDEX: 44.17 KG/M2

## 2025-08-26 VITALS — RESPIRATION RATE: 22 BRPM | OXYGEN SATURATION: 96 % | HEART RATE: 110 BPM

## 2025-08-26 DIAGNOSIS — J84.9 ILD (INTERSTITIAL LUNG DISEASE) (HCC): ICD-10-CM

## 2025-08-26 LAB
DLCO %PRED: 70 %
DLCO PRED: NORMAL
DLCO/VA %PRED: NORMAL
DLCO/VA PRED: NORMAL
DLCO/VA: NORMAL
DLCO: NORMAL
EXPIRATORY TIME-POST: NORMAL
EXPIRATORY TIME: NORMAL
FEF 25-75 %CHNG: NORMAL
FEF 25-75 POST %PRED: NORMAL
FEF 25-75% %PRED-PRE: NORMAL
FEF 25-75% PRED: NORMAL
FEF 25-75-POST: NORMAL
FEF 25-75-PRE: NORMAL
FEV1 %PRED-POST: 89 %
FEV1 %PRED-PRE: 89 %
FEV1 PRED: NORMAL
FEV1-POST: NORMAL
FEV1-PRE: NORMAL
FEV1/FVC %PRED-POST: 111 %
FEV1/FVC %PRED-PRE: 106 %
FEV1/FVC PRED: NORMAL
FEV1/FVC-POST: NORMAL
FEV1/FVC-PRE: NORMAL
FVC %PRED-POST: 77 L
FVC %PRED-PRE: 81 %
FVC PRED: NORMAL
FVC-POST: NORMAL
FVC-PRE: NORMAL
GAW %PRED: NORMAL
GAW PRED: NORMAL
GAW: NORMAL
IC PRE %PRED: NORMAL
IC PRED: NORMAL
IC: NORMAL
MEP: NORMAL
MIP: NORMAL
MVV %PRED-PRE: NORMAL
MVV PRED: NORMAL
MVV-PRE: NORMAL
PEF %PRED-POST: NORMAL
PEF %PRED-PRE: NORMAL
PEF PRED: NORMAL
PEF%CHNG: NORMAL
PEF-POST: NORMAL
PEF-PRE: NORMAL
RAW %PRED: NORMAL
RAW PRED: NORMAL
RAW: NORMAL
RV PRE %PRED: NORMAL
RV PRED: NORMAL
RV: NORMAL
SVC %PRED: NORMAL
SVC PRED: NORMAL
SVC: NORMAL
TLC PRE %PRED: 78 %
TLC PRED: NORMAL
TLC: NORMAL
VA %PRED: NORMAL
VA PRED: NORMAL
VA: NORMAL
VTG %PRED: NORMAL
VTG PRED: NORMAL
VTG: NORMAL

## 2025-08-26 PROCEDURE — 1159F MED LIST DOCD IN RCRD: CPT | Performed by: INTERNAL MEDICINE

## 2025-08-26 PROCEDURE — G8417 CALC BMI ABV UP PARAM F/U: HCPCS | Performed by: INTERNAL MEDICINE

## 2025-08-26 PROCEDURE — 99215 OFFICE O/P EST HI 40 MIN: CPT | Performed by: INTERNAL MEDICINE

## 2025-08-26 PROCEDURE — 3074F SYST BP LT 130 MM HG: CPT | Performed by: INTERNAL MEDICINE

## 2025-08-26 PROCEDURE — G8427 DOCREV CUR MEDS BY ELIG CLIN: HCPCS | Performed by: INTERNAL MEDICINE

## 2025-08-26 PROCEDURE — G2211 COMPLEX E/M VISIT ADD ON: HCPCS | Performed by: INTERNAL MEDICINE

## 2025-08-26 PROCEDURE — 1123F ACP DISCUSS/DSCN MKR DOCD: CPT | Performed by: INTERNAL MEDICINE

## 2025-08-26 PROCEDURE — 94060 EVALUATION OF WHEEZING: CPT

## 2025-08-26 PROCEDURE — 94726 PLETHYSMOGRAPHY LUNG VOLUMES: CPT

## 2025-08-26 PROCEDURE — 1036F TOBACCO NON-USER: CPT | Performed by: INTERNAL MEDICINE

## 2025-08-26 PROCEDURE — 3078F DIAST BP <80 MM HG: CPT | Performed by: INTERNAL MEDICINE

## 2025-08-26 PROCEDURE — 94729 DIFFUSING CAPACITY: CPT

## 2025-08-26 PROCEDURE — 94760 N-INVAS EAR/PLS OXIMETRY 1: CPT

## 2025-08-26 PROCEDURE — 6370000000 HC RX 637 (ALT 250 FOR IP): Performed by: INTERNAL MEDICINE

## 2025-08-26 RX ORDER — PIRFENIDONE 267 MG/1
801 CAPSULE ORAL
Qty: 270 CAPSULE | Refills: 5 | Status: SHIPPED | OUTPATIENT
Start: 2025-08-26

## 2025-08-26 RX ORDER — ALBUTEROL SULFATE 90 UG/1
4 INHALANT RESPIRATORY (INHALATION) ONCE
Status: COMPLETED | OUTPATIENT
Start: 2025-08-26 | End: 2025-08-26

## 2025-08-26 RX ADMIN — Medication 4 PUFF: at 12:28

## 2025-08-26 ASSESSMENT — PULMONARY FUNCTION TESTS
FVC_PERCENT_PREDICTED_PRE: 81
FEV1/FVC_PERCENT_PREDICTED_POST: 111
FEV1_PERCENT_PREDICTED_PRE: 89
FVC_PERCENT_PREDICTED_POST: 77
FEV1/FVC_PERCENT_PREDICTED_PRE: 106
FEV1_PERCENT_PREDICTED_POST: 89

## (undated) DEVICE — STANDARD HYPODERMIC NEEDLE,POLYPROPYLENE HUB: Brand: MONOJECT

## (undated) DEVICE — SCREW BNE L80MM DIA6.5MM THRD L16MM CANC S STL SELF DRL ST
Type: IMPLANTABLE DEVICE | Site: HIP | Status: NON-FUNCTIONAL
Removed: 2023-03-28

## (undated) DEVICE — GUIDEWIRE ORTH L300MM DIA2.8MM S STL THRD SHRP TRCR TIP FOR

## (undated) DEVICE — SYRINGE, LUER LOCK, 10ML: Brand: MEDLINE

## (undated) DEVICE — GLOVE ORANGE PI 7 1/2   MSG9075

## (undated) DEVICE — RETRIEVAL DEVICE: Brand: RESCUENET™

## (undated) DEVICE — SUTURE VCRL SZ 2-0 L18IN ABSRB UD CT-1 L36MM 1/2 CIR J839D

## (undated) DEVICE — SUTURE VCRL SZ 0 L36IN ABSRB UD L36MM CT-1 1/2 CIR J946H

## (undated) DEVICE — SUTURE MCRYL SZ 3-0 L27IN ABSRB UD PS-2 3/8 CIR REV CUT NDL MCP427H

## (undated) DEVICE — DRESSING,GAUZE,XEROFORM,CURAD,1"X8",ST: Brand: CURAD

## (undated) DEVICE — DRESSING FOAM W4XL4IN SIL FACE BORD ADH PD SUP ABSRB COR

## (undated) DEVICE — ELECTRODE ECG MONITR FOAM TEAR DROP ADLT RED

## (undated) DEVICE — SUTURE MCRYL + SZ 4-0 L18IN ABSRB UD L19MM PS-2 3/8 CIR MCP496G

## (undated) DEVICE — BIT DRL L300MM DIA5MM CANN QUIK CPL ADJ STP REUSE

## (undated) DEVICE — PACK PROCEDURE SURG HIP PIN TROCHANTERIC FEM NAIL CDS

## (undated) DEVICE — CONMED SCOPE SAVER BITE BLOCK, 20X27 MM: Brand: SCOPE SAVER

## (undated) DEVICE — GLOVE ORANGE PI 8   MSG9080

## (undated) DEVICE — ENDOSCOPIC KIT 2 12 FT OP4 DE2 GWN SYR

## (undated) DEVICE — 3M™ IOBAN™ 2 ANTIMICROBIAL INCISE DRAPE 6650EZ: Brand: IOBAN™ 2

## (undated) DEVICE — FORCEPS BX L240CM JAW DIA2.8MM L CAP W/ NDL MIC MESH TOOTH

## (undated) DEVICE — ENDO CARRY-ON PROCEDURE KIT INCLUDES SUCTION TUBING, LUBRICANT, GAUZE, BIOHAZARD STICKER, TRANSPORT PAD AND INTERCEPT BEDSIDE KIT.: Brand: ENDO CARRY-ON PROCEDURE KIT

## (undated) DEVICE — TUBING, SUCTION, 1/4" X 10', STRAIGHT: Brand: MEDLINE

## (undated) DEVICE — GLOVE SURG SZ 8 L12IN FNGR THK79MIL GRN LTX FREE